# Patient Record
Sex: MALE | Race: WHITE | Employment: OTHER | URBAN - METROPOLITAN AREA
[De-identification: names, ages, dates, MRNs, and addresses within clinical notes are randomized per-mention and may not be internally consistent; named-entity substitution may affect disease eponyms.]

---

## 2017-03-03 ENCOUNTER — TELEPHONE (OUTPATIENT)
Dept: INTERNAL MEDICINE CLINIC | Facility: CLINIC | Age: 82
End: 2017-03-03

## 2017-03-03 NOTE — TELEPHONE ENCOUNTER
[facility] requesting NEW RX for:  Atenolol Tab  Omeprazole Cap  Simvastatin Tab  Clopidogrel Tab   Tasked to [employer]

## 2017-03-06 RX ORDER — CLOPIDOGREL BISULFATE 75 MG/1
TABLET ORAL
Qty: 90 TABLET | Refills: 1 | Status: SHIPPED | OUTPATIENT
Start: 2017-03-06 | End: 2017-08-31

## 2017-03-06 RX ORDER — OMEPRAZOLE 20 MG/1
CAPSULE, DELAYED RELEASE ORAL
Qty: 90 CAPSULE | Refills: 1 | Status: SHIPPED | OUTPATIENT
Start: 2017-03-06 | End: 2017-08-31

## 2017-03-06 RX ORDER — ATENOLOL 25 MG/1
TABLET ORAL
Qty: 90 TABLET | Refills: 1 | Status: SHIPPED | OUTPATIENT
Start: 2017-03-06 | End: 2017-08-31

## 2017-03-06 RX ORDER — SIMVASTATIN 40 MG
TABLET ORAL
Qty: 90 TABLET | Refills: 1 | Status: SHIPPED | OUTPATIENT
Start: 2017-03-06 | End: 2017-08-31

## 2017-03-06 NOTE — TELEPHONE ENCOUNTER
Wife called and clarified meds and proper dosing as we have written.  Will send in 90 day scripts per request

## 2017-04-03 ENCOUNTER — OFFICE VISIT (OUTPATIENT)
Dept: INTERNAL MEDICINE CLINIC | Facility: CLINIC | Age: 82
End: 2017-04-03

## 2017-04-03 VITALS
BODY MASS INDEX: 23.66 KG/M2 | OXYGEN SATURATION: 98 % | SYSTOLIC BLOOD PRESSURE: 140 MMHG | DIASTOLIC BLOOD PRESSURE: 72 MMHG | HEART RATE: 71 BPM | WEIGHT: 169 LBS | TEMPERATURE: 98 F | HEIGHT: 71 IN

## 2017-04-03 DIAGNOSIS — R10.31 BILATERAL LOWER ABDOMINAL DISCOMFORT: ICD-10-CM

## 2017-04-03 DIAGNOSIS — R63.4 WEIGHT LOSS: ICD-10-CM

## 2017-04-03 DIAGNOSIS — Z95.0 STATUS CARDIAC PACEMAKER: Primary | ICD-10-CM

## 2017-04-03 DIAGNOSIS — I48.20 CHRONIC ATRIAL FIBRILLATION (HCC): ICD-10-CM

## 2017-04-03 DIAGNOSIS — Z87.19 HISTORY OF GASTROINTESTINAL BLEEDING: ICD-10-CM

## 2017-04-03 DIAGNOSIS — R10.32 BILATERAL LOWER ABDOMINAL DISCOMFORT: ICD-10-CM

## 2017-04-03 DIAGNOSIS — Z00.00 ROUTINE HEALTH MAINTENANCE: ICD-10-CM

## 2017-04-03 DIAGNOSIS — I25.10 CORONARY ARTERY DISEASE INVOLVING NATIVE CORONARY ARTERY OF NATIVE HEART WITHOUT ANGINA PECTORIS: ICD-10-CM

## 2017-04-03 DIAGNOSIS — Z95.5 S/P CORONARY ARTERY STENT PLACEMENT: ICD-10-CM

## 2017-04-03 DIAGNOSIS — Z86.010 HISTORY OF COLON POLYPS: ICD-10-CM

## 2017-04-03 PROCEDURE — G0463 HOSPITAL OUTPT CLINIC VISIT: HCPCS | Performed by: INTERNAL MEDICINE

## 2017-04-03 PROCEDURE — 99215 OFFICE O/P EST HI 40 MIN: CPT | Performed by: INTERNAL MEDICINE

## 2017-04-03 NOTE — PROGRESS NOTES
Erika Sanchez is a 80year old male   HPI:   Pt.presents for the following problems. Patient has an appointment Dr. Zoey Younger in June. He has a history of colonic polyps with last colonoscopy in 2005. History of tubular adenomas.    He also has a kg)    Body mass index is 23.58 kg/(m^2).        Current Outpatient Prescriptions:  atenolol 25 MG Oral Tab Take 1 tablet by mouth  daily Disp: 90 tablet Rfl: 1   Clopidogrel Bisulfate 75 MG Oral Tab Take 1 tablet by mouth  daily Disp: 90 tablet Rfl: 1   om transient. This was about 3 months ago. He does not feel the low back pain warrants investigation. NEURO:  denies headaches or dizzyness  PSYCHE: Patient under stress at home with his wife. He wishes no counseling. Declines antidepressant therapy. bleeding. 7. Bilateral lower abdominal discomfort  Exam benign. No tenderness. No organomegaly. Offered CT of abdomen and pelvis before his gastroenterology visit but patient declines. He wishes to discuss this with Dr. Tom Kirkland.     8. History o

## 2017-05-23 RX ORDER — LORAZEPAM 0.5 MG/1
TABLET ORAL
Qty: 30 TABLET | Refills: 0 | Status: SHIPPED
Start: 2017-05-23 | End: 2017-07-14

## 2017-05-23 NOTE — TELEPHONE ENCOUNTER
Okay to call in Lorazepam.  Quantity #30.  0.5 mg.  1 daily as needed for anxiety. No additional refills.

## 2017-05-23 NOTE — TELEPHONE ENCOUNTER
To Dr. Ki Calderon  - see below - last fill 2/28/16 --To MD:  The above refill request is for a controlled substance. Please indicate yes or no to refill 30 days supply plus one refill.   If more refills are appropriate, please indicate quantity

## 2017-05-23 NOTE — TELEPHONE ENCOUNTER
Called patient , spoke with spouse - they want RX to mail pharmacy - to DR. JORDAN could you finish pending RX so it can be faxed to mail order pharmacy please thank you

## 2017-06-01 ENCOUNTER — OFFICE VISIT (OUTPATIENT)
Dept: GASTROENTEROLOGY | Facility: CLINIC | Age: 82
End: 2017-06-01

## 2017-06-01 VITALS
HEIGHT: 71 IN | HEART RATE: 70 BPM | SYSTOLIC BLOOD PRESSURE: 140 MMHG | WEIGHT: 172.38 LBS | BODY MASS INDEX: 24.13 KG/M2 | DIASTOLIC BLOOD PRESSURE: 90 MMHG

## 2017-06-01 DIAGNOSIS — Z12.11 SCREENING FOR COLORECTAL CANCER: ICD-10-CM

## 2017-06-01 DIAGNOSIS — Z86.010 HISTORY OF COLON POLYPS: Primary | ICD-10-CM

## 2017-06-01 DIAGNOSIS — Z12.12 SCREENING FOR COLORECTAL CANCER: ICD-10-CM

## 2017-06-01 PROCEDURE — 99203 OFFICE O/P NEW LOW 30 MIN: CPT | Performed by: INTERNAL MEDICINE

## 2017-06-01 PROCEDURE — G0463 HOSPITAL OUTPT CLINIC VISIT: HCPCS | Performed by: INTERNAL MEDICINE

## 2017-06-02 NOTE — PROGRESS NOTES
HPI:    Patient ID: Ras Jones is a 80year old male. HPI  Jd Jefferson returns today to discuss abdominal discomfort and a remote history of colon polyps. He has not been seen for quite some time (greater than 10 years).     Jd Jefferson has a history of an iron defi for anxiety Disp: 30 tablet Rfl: 0   atenolol 25 MG Oral Tab Take 1 tablet by mouth  daily Disp: 90 tablet Rfl: 1   Clopidogrel Bisulfate 75 MG Oral Tab Take 1 tablet by mouth  daily Disp: 90 tablet Rfl: 1   omeprazole 20 MG Oral Capsule Delayed Release Ta 1.04     CALCIUM      8.5-10.5 mg/dL 8.9     ALT (SGPT)      17-63 U/L 18     AST (SGOT)      15-41 U/L 23     ALKALINE PHOSPHATASE       U/L 57     Total Bilirubin      0.3-1.2 mg/dL 0.8     TOTAL PROTEIN      5.9-8.4 g/dL 6.4     Albumin      3.5-4 repeating a colonoscopy for the history of polyps at the age of 80-4/2 is a personal decision. I certainly cannot exclude metachronous polyps.   We discussed the rationale, nature and risks of a colonoscopy including bleeding and perforation requiring surg

## 2017-06-02 NOTE — PATIENT INSTRUCTIONS
1.  Continue omeprazole. 2.  Issues regarding colonoscopy as we discussed. Please contact me if you wish to proceed. 3.  Please follow-up with myself or Dr. Torres if the discomfort continues.

## 2017-07-14 RX ORDER — LORAZEPAM 0.5 MG/1
TABLET ORAL
Qty: 30 TABLET | Refills: 0 | Status: SHIPPED
Start: 2017-07-14 | End: 2019-03-26

## 2017-07-14 RX ORDER — LORAZEPAM 0.5 MG/1
TABLET ORAL
Qty: 30 TABLET | Refills: 0 | Status: SHIPPED
Start: 2017-07-14 | End: 2017-07-14

## 2017-07-14 NOTE — TELEPHONE ENCOUNTER
Order faxed to [address] [place] [address] in error. (Pt wants medication from [facility].)   Will have Dr. Sanchez Jon generate a new order. Pharmacy default changed. [facility] pharmacy to cancel.

## 2017-07-14 NOTE — TELEPHONE ENCOUNTER
Dr. Sd Edwards - the order has to come from you for Epic - we can call/fax after you order it. See pended above.

## 2017-07-14 NOTE — TELEPHONE ENCOUNTER
To Dr. Lundy Shall - To MD:  The above refill request is for a controlled substance. Please indicate yes or no to refill 30 days supply plus one refill.   If more refills are appropriate, please indicate quantity

## 2017-07-24 ENCOUNTER — TELEPHONE (OUTPATIENT)
Dept: INTERNAL MEDICINE CLINIC | Facility: CLINIC | Age: 82
End: 2017-07-24

## 2017-07-24 DIAGNOSIS — Z01.00 EYE EXAM, ROUTINE: Primary | ICD-10-CM

## 2017-07-25 NOTE — TELEPHONE ENCOUNTER
Called and spoke with spouse per HIPAA. Spouse made aware that referral has been generated. Stated patients appt was tomorrow.  Informed that it takes 5-7 days for managed care to process for insurance approval. Advised to reschedule a week or so from today

## 2017-08-22 ENCOUNTER — TELEPHONE (OUTPATIENT)
Dept: INTERNAL MEDICINE CLINIC | Facility: CLINIC | Age: 82
End: 2017-08-22

## 2017-08-22 DIAGNOSIS — H26.8 OTHER CATARACT OF BOTH EYES: Primary | ICD-10-CM

## 2017-08-22 NOTE — TELEPHONE ENCOUNTER
Pt needs a referral for Dr Brian Garza fax [phone] , pt has [facility] Medicare replacement, please call spouse Law Velazquez when complete [phone]   Tasked to nursing

## 2017-09-04 RX ORDER — OMEPRAZOLE 20 MG/1
CAPSULE, DELAYED RELEASE ORAL
Qty: 90 CAPSULE | Refills: 3 | Status: SHIPPED | OUTPATIENT
Start: 2017-09-04 | End: 2018-07-26

## 2017-09-04 RX ORDER — SIMVASTATIN 40 MG
TABLET ORAL
Qty: 90 TABLET | Refills: 3 | Status: SHIPPED | OUTPATIENT
Start: 2017-09-04 | End: 2018-07-26

## 2017-09-04 RX ORDER — ATENOLOL 25 MG/1
TABLET ORAL
Qty: 90 TABLET | Refills: 3 | Status: SHIPPED | OUTPATIENT
Start: 2017-09-04 | End: 2018-07-26

## 2017-09-04 RX ORDER — CLOPIDOGREL BISULFATE 75 MG/1
TABLET ORAL
Qty: 90 TABLET | Refills: 3 | Status: SHIPPED | OUTPATIENT
Start: 2017-09-04 | End: 2018-07-26

## 2017-09-15 ENCOUNTER — TELEPHONE (OUTPATIENT)
Dept: INTERNAL MEDICINE CLINIC | Facility: CLINIC | Age: 82
End: 2017-09-15

## 2017-09-15 DIAGNOSIS — H53.15 METAMORPHOPSIA: Primary | ICD-10-CM

## 2017-09-15 NOTE — TELEPHONE ENCOUNTER
Please advise - Dionna Woodard called from DR. Arrington office. Patient needs to see retinal specialist upcoming Tuesday and needs referral for DR. Anabell Maya Phone # [phone] and fax # [phone] .  Referral is pending and needs to go to [facility] thanks -

## 2017-09-15 NOTE — TELEPHONE ENCOUNTER
office requesting a referral for  [facility] specialist. Call transferred to [facility]

## 2017-09-19 NOTE — TELEPHONE ENCOUNTER
Dr Cindy Martino office calling they also need a copy of referral please fax to them fax #[phone]    Referral faxed

## 2017-10-27 ENCOUNTER — TELEPHONE (OUTPATIENT)
Dept: INTERNAL MEDICINE CLINIC | Facility: CLINIC | Age: 82
End: 2017-10-27

## 2017-10-27 DIAGNOSIS — I48.20 ATRIAL FIBRILLATION, CHRONIC (HCC): Primary | ICD-10-CM

## 2017-10-27 NOTE — TELEPHONE ENCOUNTER
Pt. Needs a referral for Dr. Jimbo Ramirez for a pacemaker   Pt. Has an appt. 12/05  ph.  # [phone]    Routed to clinical

## 2017-12-05 ENCOUNTER — PRIOR ORIGINAL RECORDS (OUTPATIENT)
Dept: OTHER | Age: 82
End: 2017-12-05

## 2018-02-16 ENCOUNTER — APPOINTMENT (OUTPATIENT)
Dept: LAB | Facility: HOSPITAL | Age: 83
End: 2018-02-16
Attending: INTERNAL MEDICINE
Payer: MEDICARE

## 2018-02-16 ENCOUNTER — PRIOR ORIGINAL RECORDS (OUTPATIENT)
Dept: OTHER | Age: 83
End: 2018-02-16

## 2018-02-16 ENCOUNTER — HOSPITAL ENCOUNTER (OUTPATIENT)
Dept: GENERAL RADIOLOGY | Facility: HOSPITAL | Age: 83
Discharge: HOME | End: 2018-02-16
Attending: INTERNAL MEDICINE
Payer: MEDICARE

## 2018-02-16 DIAGNOSIS — I44.2 THIRD DEGREE ATRIOVENTRICULAR BLOCK (HCC): ICD-10-CM

## 2018-02-16 DIAGNOSIS — Z95.0 PACEMAKER: ICD-10-CM

## 2018-02-16 DIAGNOSIS — Z45.010 PACEMAKER AT END OF BATTERY LIFE: ICD-10-CM

## 2018-02-16 DIAGNOSIS — I25.119 ATHEROSCLEROSIS OF NATIVE CORONARY ARTERY OF NATIVE HEART WITH ANGINA PECTORIS (HCC): ICD-10-CM

## 2018-02-16 LAB
ANION GAP SERPL CALC-SCNC: 8 MMOL/L (ref 0–18)
BASOPHILS # BLD: 0.1 K/UL (ref 0–0.2)
BASOPHILS NFR BLD: 1 %
BUN SERPL-MCNC: 14 MG/DL (ref 8–20)
BUN/CREAT SERPL: 13.7 (ref 10–20)
CALCIUM SERPL-MCNC: 8.7 MG/DL (ref 8.5–10.5)
CHLORIDE SERPL-SCNC: 104 MMOL/L (ref 95–110)
CO2 SERPL-SCNC: 28 MMOL/L (ref 22–32)
CREAT SERPL-MCNC: 1.02 MG/DL (ref 0.5–1.5)
EOSINOPHIL # BLD: 0.3 K/UL (ref 0–0.7)
EOSINOPHIL NFR BLD: 4 %
ERYTHROCYTE [DISTWIDTH] IN BLOOD BY AUTOMATED COUNT: 15.1 % (ref 11–15)
GLUCOSE SERPL-MCNC: 117 MG/DL (ref 70–99)
HCT VFR BLD AUTO: 40.3 % (ref 41–52)
HGB BLD-MCNC: 13.9 G/DL (ref 13.5–17.5)
LYMPHOCYTES # BLD: 1.2 K/UL (ref 1–4)
LYMPHOCYTES NFR BLD: 17 %
MCH RBC QN AUTO: 30.9 PG (ref 27–32)
MCHC RBC AUTO-ENTMCNC: 34.5 G/DL (ref 32–37)
MCV RBC AUTO: 89.5 FL (ref 80–100)
MONOCYTES # BLD: 0.6 K/UL (ref 0–1)
MONOCYTES NFR BLD: 9 %
MRSA DNA SPEC QL NAA+PROBE: NEGATIVE
NEUTROPHILS # BLD AUTO: 4.7 K/UL (ref 1.8–7.7)
NEUTROPHILS NFR BLD: 69 %
OSMOLALITY UR CALC.SUM OF ELEC: 292 MOSM/KG (ref 275–295)
PLATELET # BLD AUTO: 203 K/UL (ref 140–400)
PMV BLD AUTO: 8.2 FL (ref 7.4–10.3)
POTASSIUM SERPL-SCNC: 4.5 MMOL/L (ref 3.3–5.1)
RBC # BLD AUTO: 4.5 M/UL (ref 4.5–5.9)
SODIUM SERPL-SCNC: 140 MMOL/L (ref 136–144)
WBC # BLD AUTO: 6.8 K/UL (ref 4–11)

## 2018-02-16 PROCEDURE — 80048 BASIC METABOLIC PNL TOTAL CA: CPT

## 2018-02-16 PROCEDURE — 87641 MR-STAPH DNA AMP PROBE: CPT

## 2018-02-16 PROCEDURE — 85025 COMPLETE CBC W/AUTO DIFF WBC: CPT

## 2018-02-16 PROCEDURE — 93005 ELECTROCARDIOGRAM TRACING: CPT

## 2018-02-16 PROCEDURE — 93010 ELECTROCARDIOGRAM REPORT: CPT | Performed by: INTERNAL MEDICINE

## 2018-02-16 PROCEDURE — 36415 COLL VENOUS BLD VENIPUNCTURE: CPT

## 2018-02-16 PROCEDURE — 71046 X-RAY EXAM CHEST 2 VIEWS: CPT | Performed by: INTERNAL MEDICINE

## 2018-02-17 ENCOUNTER — HOSPITAL ENCOUNTER (OUTPATIENT)
Dept: CV DIAGNOSTICS | Facility: HOSPITAL | Age: 83
Discharge: HOME | End: 2018-02-17
Attending: INTERNAL MEDICINE
Payer: MEDICARE

## 2018-02-17 DIAGNOSIS — I48.20 ATRIAL FIBRILLATION, CHRONIC (HCC): ICD-10-CM

## 2018-02-17 DIAGNOSIS — I44.2 ATRIOVENTRICULAR BLOCK, COMPLETE (HCC): ICD-10-CM

## 2018-02-17 DIAGNOSIS — I25.10 CORONARY ATHEROSCLEROSIS OF NATIVE CORONARY ARTERY: ICD-10-CM

## 2018-02-17 PROCEDURE — 93306 TTE W/DOPPLER COMPLETE: CPT | Performed by: INTERNAL MEDICINE

## 2018-02-19 ENCOUNTER — PRIOR ORIGINAL RECORDS (OUTPATIENT)
Dept: OTHER | Age: 83
End: 2018-02-19

## 2018-02-19 LAB
BUN: 14 MG/DL
CALCIUM: 8.7 MG/DL
CHLORIDE: 104 MEQ/L
CREATININE, SERUM: 1.02 MG/DL
GLUCOSE: 117 MG/DL
HEMATOCRIT: 40.3 %
HEMOGLOBIN: 13.9 G/DL
PLATELETS: 203 K/UL
POTASSIUM, SERUM: 4.5 MEQ/L
RED BLOOD COUNT: 4.5 X 10-6/U
SODIUM: 140 MEQ/L
WHITE BLOOD COUNT: 6.8 X 10-3/U

## 2018-02-20 ENCOUNTER — PRIOR ORIGINAL RECORDS (OUTPATIENT)
Dept: OTHER | Age: 83
End: 2018-02-20

## 2018-02-21 RX ORDER — SODIUM CHLORIDE 9 MG/ML
INJECTION, SOLUTION INTRAVENOUS CONTINUOUS
Status: DISCONTINUED | OUTPATIENT
Start: 2018-02-23 | End: 2018-02-23

## 2018-02-23 ENCOUNTER — HOSPITAL ENCOUNTER (OUTPATIENT)
Dept: INTERVENTIONAL RADIOLOGY/VASCULAR | Facility: HOSPITAL | Age: 83
Discharge: HOME | End: 2018-02-23
Attending: INTERNAL MEDICINE | Admitting: INTERNAL MEDICINE
Payer: MEDICARE

## 2018-02-23 ENCOUNTER — ANESTHESIA EVENT (OUTPATIENT)
Dept: INTERVENTIONAL RADIOLOGY/VASCULAR | Facility: HOSPITAL | Age: 83
End: 2018-02-23
Payer: MEDICARE

## 2018-02-23 ENCOUNTER — APPOINTMENT (OUTPATIENT)
Dept: GENERAL RADIOLOGY | Facility: HOSPITAL | Age: 83
End: 2018-02-23
Attending: INTERNAL MEDICINE
Payer: MEDICARE

## 2018-02-23 VITALS
DIASTOLIC BLOOD PRESSURE: 92 MMHG | TEMPERATURE: 98 F | HEART RATE: 88 BPM | WEIGHT: 173 LBS | RESPIRATION RATE: 18 BRPM | BODY MASS INDEX: 24.22 KG/M2 | OXYGEN SATURATION: 100 % | SYSTOLIC BLOOD PRESSURE: 160 MMHG | HEIGHT: 71 IN

## 2018-02-23 DIAGNOSIS — Z45.010 PACEMAKER BATTERY DEPLETION: ICD-10-CM

## 2018-02-23 DIAGNOSIS — I25.10 CAD (CORONARY ARTERY DISEASE): ICD-10-CM

## 2018-02-23 DIAGNOSIS — Z95.0 PACEMAKER: ICD-10-CM

## 2018-02-23 DIAGNOSIS — I44.2 AV BLOCK, 3RD DEGREE (HCC): ICD-10-CM

## 2018-02-23 DIAGNOSIS — R00.1 BRADYCARDIA: ICD-10-CM

## 2018-02-23 DIAGNOSIS — I25.10 ARTERIOSCLEROTIC HEART DISEASE (ASHD): Primary | ICD-10-CM

## 2018-02-23 PROCEDURE — 33228 REMV&REPLC PM GEN DUAL LEAD: CPT

## 2018-02-23 PROCEDURE — 0JPT0PZ REMOVAL OF CARDIAC RHYTHM RELATED DEVICE FROM TRUNK SUBCUTANEOUS TISSUE AND FASCIA, OPEN APPROACH: ICD-10-PCS | Performed by: INTERNAL MEDICINE

## 2018-02-23 PROCEDURE — 71045 X-RAY EXAM CHEST 1 VIEW: CPT | Performed by: INTERNAL MEDICINE

## 2018-02-23 PROCEDURE — 0JH606Z INSERTION OF PACEMAKER, DUAL CHAMBER INTO CHEST SUBCUTANEOUS TISSUE AND FASCIA, OPEN APPROACH: ICD-10-PCS | Performed by: INTERNAL MEDICINE

## 2018-02-23 RX ORDER — SODIUM CHLORIDE, SODIUM LACTATE, POTASSIUM CHLORIDE, CALCIUM CHLORIDE 600; 310; 30; 20 MG/100ML; MG/100ML; MG/100ML; MG/100ML
INJECTION, SOLUTION INTRAVENOUS CONTINUOUS
Status: DISCONTINUED | OUTPATIENT
Start: 2018-02-23 | End: 2018-02-23

## 2018-02-23 RX ORDER — HALOPERIDOL 5 MG/ML
0.25 INJECTION INTRAMUSCULAR ONCE AS NEEDED
Status: DISCONTINUED | OUTPATIENT
Start: 2018-02-23 | End: 2018-02-23

## 2018-02-23 RX ORDER — SODIUM CHLORIDE, SODIUM LACTATE, POTASSIUM CHLORIDE, CALCIUM CHLORIDE 600; 310; 30; 20 MG/100ML; MG/100ML; MG/100ML; MG/100ML
INJECTION, SOLUTION INTRAVENOUS CONTINUOUS PRN
Status: DISCONTINUED | OUTPATIENT
Start: 2018-02-23 | End: 2018-02-23 | Stop reason: SURG

## 2018-02-23 RX ORDER — HYDROCODONE BITARTRATE AND ACETAMINOPHEN 5; 325 MG/1; MG/1
2 TABLET ORAL AS NEEDED
Status: DISCONTINUED | OUTPATIENT
Start: 2018-02-23 | End: 2018-02-23

## 2018-02-23 RX ORDER — CEFAZOLIN SODIUM 1 G/3ML
INJECTION, POWDER, FOR SOLUTION INTRAMUSCULAR; INTRAVENOUS AS NEEDED
Status: DISCONTINUED | OUTPATIENT
Start: 2018-02-23 | End: 2018-02-23 | Stop reason: SURG

## 2018-02-23 RX ORDER — ONDANSETRON 2 MG/ML
4 INJECTION INTRAMUSCULAR; INTRAVENOUS ONCE AS NEEDED
Status: DISCONTINUED | OUTPATIENT
Start: 2018-02-23 | End: 2018-02-23

## 2018-02-23 RX ORDER — MORPHINE SULFATE 10 MG/ML
6 INJECTION, SOLUTION INTRAMUSCULAR; INTRAVENOUS EVERY 10 MIN PRN
Status: DISCONTINUED | OUTPATIENT
Start: 2018-02-23 | End: 2018-02-23

## 2018-02-23 RX ORDER — GLYCOPYRROLATE 0.2 MG/ML
INJECTION, SOLUTION INTRAMUSCULAR; INTRAVENOUS AS NEEDED
Status: DISCONTINUED | OUTPATIENT
Start: 2018-02-23 | End: 2018-02-23 | Stop reason: SURG

## 2018-02-23 RX ORDER — MORPHINE SULFATE 4 MG/ML
2 INJECTION, SOLUTION INTRAMUSCULAR; INTRAVENOUS EVERY 10 MIN PRN
Status: DISCONTINUED | OUTPATIENT
Start: 2018-02-23 | End: 2018-02-23

## 2018-02-23 RX ORDER — METOPROLOL TARTRATE 5 MG/5ML
2.5 INJECTION INTRAVENOUS ONCE
Status: DISCONTINUED | OUTPATIENT
Start: 2018-02-23 | End: 2018-02-23

## 2018-02-23 RX ORDER — HYDROCODONE BITARTRATE AND ACETAMINOPHEN 5; 325 MG/1; MG/1
1 TABLET ORAL AS NEEDED
Status: DISCONTINUED | OUTPATIENT
Start: 2018-02-23 | End: 2018-02-23

## 2018-02-23 RX ORDER — SODIUM CHLORIDE 9 MG/ML
INJECTION, SOLUTION INTRAVENOUS
Status: COMPLETED
Start: 2018-02-23 | End: 2018-02-23

## 2018-02-23 RX ORDER — SODIUM CHLORIDE 9 MG/ML
INJECTION, SOLUTION INTRAVENOUS
Status: DISCONTINUED
Start: 2018-02-23 | End: 2018-02-23

## 2018-02-23 RX ORDER — ONDANSETRON 2 MG/ML
INJECTION INTRAMUSCULAR; INTRAVENOUS AS NEEDED
Status: DISCONTINUED | OUTPATIENT
Start: 2018-02-23 | End: 2018-02-23 | Stop reason: SURG

## 2018-02-23 RX ORDER — DEXAMETHASONE SODIUM PHOSPHATE 4 MG/ML
VIAL (ML) INJECTION AS NEEDED
Status: DISCONTINUED | OUTPATIENT
Start: 2018-02-23 | End: 2018-02-23 | Stop reason: SURG

## 2018-02-23 RX ORDER — BACITRACIN 50000 [USP'U]/1
INJECTION, POWDER, LYOPHILIZED, FOR SOLUTION INTRAMUSCULAR
Status: COMPLETED
Start: 2018-02-23 | End: 2018-02-23

## 2018-02-23 RX ORDER — MORPHINE SULFATE 4 MG/ML
4 INJECTION, SOLUTION INTRAMUSCULAR; INTRAVENOUS EVERY 10 MIN PRN
Status: DISCONTINUED | OUTPATIENT
Start: 2018-02-23 | End: 2018-02-23

## 2018-02-23 RX ORDER — CEFAZOLIN SODIUM/WATER 2 G/20 ML
SYRINGE (ML) INTRAVENOUS
Status: COMPLETED
Start: 2018-02-23 | End: 2018-02-23

## 2018-02-23 RX ORDER — VIT A/VIT C/VIT E/ZINC/COPPER 2148-113
1 TABLET ORAL 2 TIMES DAILY
COMMUNITY
End: 2021-01-01

## 2018-02-23 RX ORDER — NALOXONE HYDROCHLORIDE 0.4 MG/ML
80 INJECTION, SOLUTION INTRAMUSCULAR; INTRAVENOUS; SUBCUTANEOUS AS NEEDED
Status: DISCONTINUED | OUTPATIENT
Start: 2018-02-23 | End: 2018-02-23

## 2018-02-23 RX ORDER — LIDOCAINE HYDROCHLORIDE AND EPINEPHRINE 10; 10 MG/ML; UG/ML
INJECTION, SOLUTION INFILTRATION; PERINEURAL
Status: COMPLETED
Start: 2018-02-23 | End: 2018-02-23

## 2018-02-23 RX ADMIN — SODIUM CHLORIDE: 9 INJECTION, SOLUTION INTRAVENOUS at 13:13:00

## 2018-02-23 RX ADMIN — CEFAZOLIN SODIUM 2 G: 1 INJECTION, POWDER, FOR SOLUTION INTRAMUSCULAR; INTRAVENOUS at 14:03:00

## 2018-02-23 RX ADMIN — ONDANSETRON 4 MG: 2 INJECTION INTRAMUSCULAR; INTRAVENOUS at 14:00:00

## 2018-02-23 RX ADMIN — SODIUM CHLORIDE, SODIUM LACTATE, POTASSIUM CHLORIDE, CALCIUM CHLORIDE: 600; 310; 30; 20 INJECTION, SOLUTION INTRAVENOUS at 15:05:00

## 2018-02-23 RX ADMIN — SODIUM CHLORIDE, SODIUM LACTATE, POTASSIUM CHLORIDE, CALCIUM CHLORIDE: 600; 310; 30; 20 INJECTION, SOLUTION INTRAVENOUS at 13:55:00

## 2018-02-23 RX ADMIN — GLYCOPYRROLATE 0.2 MG: 0.2 INJECTION, SOLUTION INTRAMUSCULAR; INTRAVENOUS at 14:00:00

## 2018-02-23 RX ADMIN — DEXAMETHASONE SODIUM PHOSPHATE 4 MG: 4 MG/ML VIAL (ML) INJECTION at 14:00:00

## 2018-02-23 NOTE — ANESTHESIA POSTPROCEDURE EVALUATION
Patient: Levi Alfredo    Procedure Summary     Date:  02/23/18 Room / Location:  [facility]    Anesthesia Start:  5410 Anesthesia Stop:      Procedure:  EP BI-VENT PERMANENT PACEMAKER Diagnosis:       Pacemaker battery depleti

## 2018-02-23 NOTE — OPERATIVE REPORT
[facility]    Cardiac Electrophysiology Operative Note    [facility]   [facility] [id number] MRN [id number]   Admission Date 2/23/2018 Procedure Date 2/23/2018   Attending Physician Jb Barker MD Procedure Physician old generator was returned to the vendor for analysis. The new generator was connected to the leads, with visual inspection and gentle tugging confirming a secure connection. Analysis of lead function showed stable chronic findings.   The device pocket wa

## 2018-02-23 NOTE — ANESTHESIA PREPROCEDURE EVALUATION
Anesthesia PreOp Note    HPI:     Good Silva is a 80year old male who presents for preoperative consultation requested by: * No surgeons listed *    Date of Surgery: 2/23/2018    * No procedures listed *  Indication: * No pre-op diagnosis entered * Take 1 tablet by mouth daily. Disp:  Rfl:  2/21/2018 at Unknown time   aspirin 81 MG Oral Tab Take 81 mg by mouth daily.  Disp:  Rfl:  2/21/2018 at Unknown time   LORazepam 0.5 MG Oral Tab 1 tablet daily as needed for anxiety Disp: 30 tablet Rfl: 0 2/21/2 MPV 8.2 02/16/2018       Lab Results  Component Value Date    02/16/2018   K 4.5 02/16/2018    02/16/2018   CO2 28 02/16/2018   BUN 14 02/16/2018   CREATSERUM 1.02 02/16/2018    (H) 02/16/2018   CA 8.7 02/16/2018          Vital Signs:

## 2018-03-06 ENCOUNTER — PRIOR ORIGINAL RECORDS (OUTPATIENT)
Dept: OTHER | Age: 83
End: 2018-03-06

## 2018-05-24 ENCOUNTER — PATIENT OUTREACH (OUTPATIENT)
Dept: INTERNAL MEDICINE CLINIC | Facility: CLINIC | Age: 83
End: 2018-05-24

## 2018-06-19 ENCOUNTER — OFFICE VISIT (OUTPATIENT)
Dept: INTERNAL MEDICINE CLINIC | Facility: CLINIC | Age: 83
End: 2018-06-19

## 2018-06-19 VITALS
HEART RATE: 70 BPM | SYSTOLIC BLOOD PRESSURE: 134 MMHG | OXYGEN SATURATION: 98 % | HEIGHT: 71 IN | WEIGHT: 171.5 LBS | TEMPERATURE: 98 F | BODY MASS INDEX: 24.01 KG/M2 | DIASTOLIC BLOOD PRESSURE: 72 MMHG

## 2018-06-19 DIAGNOSIS — Z95.5 S/P CORONARY ARTERY STENT PLACEMENT: ICD-10-CM

## 2018-06-19 DIAGNOSIS — I25.10 CORONARY ARTERY DISEASE INVOLVING NATIVE CORONARY ARTERY OF NATIVE HEART WITHOUT ANGINA PECTORIS: ICD-10-CM

## 2018-06-19 DIAGNOSIS — Z00.00 MEDICARE ANNUAL WELLNESS VISIT, SUBSEQUENT: Primary | ICD-10-CM

## 2018-06-19 DIAGNOSIS — Z95.0 PRESENCE OF PERMANENT CARDIAC PACEMAKER: ICD-10-CM

## 2018-06-19 DIAGNOSIS — Z00.00 ROUTINE HEALTH MAINTENANCE: ICD-10-CM

## 2018-06-19 DIAGNOSIS — R53.83 FATIGUE, UNSPECIFIED TYPE: ICD-10-CM

## 2018-06-19 PROBLEM — D50.9 ANEMIA, IRON DEFICIENCY: Status: RESOLVED | Noted: 2018-06-19 | Resolved: 2018-06-19

## 2018-06-19 PROBLEM — K57.90 DIVERTICULOSIS: Status: RESOLVED | Noted: 2018-06-19 | Resolved: 2018-06-19

## 2018-06-19 PROBLEM — K57.90 DIVERTICULAR DISEASE: Status: RESOLVED | Noted: 2018-06-19 | Resolved: 2018-06-19

## 2018-06-19 PROBLEM — K92.2 GI BLEED: Status: RESOLVED | Noted: 2018-06-19 | Resolved: 2018-06-19

## 2018-06-19 PROCEDURE — 99214 OFFICE O/P EST MOD 30 MIN: CPT | Performed by: INTERNAL MEDICINE

## 2018-06-19 PROCEDURE — G0439 PPPS, SUBSEQ VISIT: HCPCS | Performed by: INTERNAL MEDICINE

## 2018-06-19 PROCEDURE — G0463 HOSPITAL OUTPT CLINIC VISIT: HCPCS | Performed by: INTERNAL MEDICINE

## 2018-06-19 NOTE — PROGRESS NOTES
You Summers is a 80year old male   HPI:   Pt.presents for the following problems. Patient has no acute physical complaints. He did have a procedure with Dr. Gil Gannon February 2018 where he had implantation of a dual chamber pacemaker.    Intra-Op fabby morning before  breakfast Disp: 90 capsule Rfl: 3   LORazepam 0.5 MG Oral Tab 1 tablet daily as needed for anxiety Disp: 30 tablet Rfl: 0   loratadine 10 MG Oral Tab Take 10 mg by mouth daily. Disp:  Rfl:    IRON OR Take 1 tablet by mouth daily.    Disp:  R oz (77.8 kg)   SpO2 98%   BMI 23.92 kg/m²     GENERAL:  well developed, well nourished in no apparent distress  SKIN:  no rashes, no suspicious lesions in areas examined. HEENT:  atraumatic, TM's normal. Canals clear. Pharynx without exudate or erythema. bathing?: No    Problems with daily activities? : No    Memory Problems?: No      Fall/Risk Assessment          Have you fallen in the last 12 months?: 0-No                                        Fall/Risk Scorin          Depression Screening (PHQ-2/PH Acuity: Uncorrected   Right Eye Chart Acuity: 20/70 Left Eye Chart Acuity: 20/50     Cognitive Assessment     What day of the week is this?: Correct    What month is it?: Correct    What year is it?: Correct    Recall \"Ball\": Correct    Recall \"Flag\": Immunization Activity if applicable    Tetanus No orders found for this or any previous visit. Update Immunization Activity if applicable    Zoster (Not covered by Medicare Part B) No orders found for this or any previous visit.  Update Immunization Activit stenting. Problem list reviewed. Patient is atrial fibrillation, coronary artery disease with drug-eluting stent to the LAD, hyperlipidemia, hypertension, macular degeneration and pacemaker. All these problems were reviewed with patient. Suzan.   Scott

## 2018-07-27 RX ORDER — SIMVASTATIN 40 MG
TABLET ORAL
Qty: 90 TABLET | Refills: 0 | Status: SHIPPED | OUTPATIENT
Start: 2018-07-27 | End: 2018-10-09

## 2018-07-27 RX ORDER — CLOPIDOGREL BISULFATE 75 MG/1
TABLET ORAL
Qty: 90 TABLET | Refills: 0 | Status: SHIPPED | OUTPATIENT
Start: 2018-07-27 | End: 2018-10-09

## 2018-07-27 RX ORDER — ATENOLOL 25 MG/1
TABLET ORAL
Qty: 90 TABLET | Refills: 0 | Status: SHIPPED | OUTPATIENT
Start: 2018-07-27 | End: 2018-10-09

## 2018-07-27 RX ORDER — OMEPRAZOLE 20 MG/1
CAPSULE, DELAYED RELEASE ORAL
Qty: 90 CAPSULE | Refills: 0 | Status: SHIPPED | OUTPATIENT
Start: 2018-07-27 | End: 2018-10-09

## 2018-09-05 ENCOUNTER — TELEPHONE (OUTPATIENT)
Dept: ALLERGY | Facility: CLINIC | Age: 83
End: 2018-09-05

## 2018-09-07 ENCOUNTER — PRIOR ORIGINAL RECORDS (OUTPATIENT)
Dept: OTHER | Age: 83
End: 2018-09-07

## 2018-09-07 ENCOUNTER — MYAURORA ACCOUNT LINK (OUTPATIENT)
Dept: OTHER | Age: 83
End: 2018-09-07

## 2018-10-14 NOTE — TELEPHONE ENCOUNTER
Okay to refill x 1.  Please call patient and advise him to go for labs that were placed so we can continue to refill his medications

## 2018-10-15 RX ORDER — CLOPIDOGREL BISULFATE 75 MG/1
75 TABLET ORAL
Qty: 90 TABLET | Refills: 0 | Status: SHIPPED | OUTPATIENT
Start: 2018-10-15 | End: 2019-03-06

## 2018-10-15 RX ORDER — OMEPRAZOLE 20 MG/1
20 CAPSULE, DELAYED RELEASE ORAL EVERY MORNING
Qty: 90 CAPSULE | Refills: 0 | Status: SHIPPED | OUTPATIENT
Start: 2018-10-15 | End: 2019-03-12

## 2018-10-15 RX ORDER — SIMVASTATIN 40 MG
40 TABLET ORAL NIGHTLY
Qty: 90 TABLET | Refills: 0 | Status: SHIPPED | OUTPATIENT
Start: 2018-10-15 | End: 2019-01-21

## 2018-10-15 RX ORDER — ATENOLOL 25 MG/1
25 TABLET ORAL
Qty: 90 TABLET | Refills: 0 | Status: SHIPPED | OUTPATIENT
Start: 2018-10-15 | End: 2019-03-06

## 2018-10-15 NOTE — TELEPHONE ENCOUNTER
Refill request is for a maintenance medication and has met the criteria specified in the Ambulatory Medication Refill Standing Order for eligibility, visits, laboratory, alerts and was sent to the requested pharmacy.     TAYLER conn for pt

## 2018-12-19 ENCOUNTER — MYAURORA ACCOUNT LINK (OUTPATIENT)
Dept: OTHER | Age: 83
End: 2018-12-19

## 2019-01-11 ENCOUNTER — TELEPHONE (OUTPATIENT)
Dept: INTERNAL MEDICINE CLINIC | Facility: CLINIC | Age: 84
End: 2019-01-11

## 2019-01-11 DIAGNOSIS — E78.5 HYPERLIPIDEMIA, UNSPECIFIED HYPERLIPIDEMIA TYPE: Primary | ICD-10-CM

## 2019-01-11 NOTE — TELEPHONE ENCOUNTER
Wife, Garry Patel, called to request lab orders  States pt needs to have blood test done for his Rx refills   - would like to go for labs this Monday Jan 14    Please call Garry Patel to advise [phone]

## 2019-01-11 NOTE — TELEPHONE ENCOUNTER
Spoke with pts wife to relay MD message that lab orders have been placed, she thanks Dr. Kamron Miller for prompt action.

## 2019-01-16 ENCOUNTER — TELEPHONE (OUTPATIENT)
Dept: INTERNAL MEDICINE CLINIC | Facility: CLINIC | Age: 84
End: 2019-01-16

## 2019-01-16 ENCOUNTER — LAB ENCOUNTER (OUTPATIENT)
Dept: LAB | Facility: HOSPITAL | Age: 84
End: 2019-01-16
Attending: INTERNAL MEDICINE
Payer: MEDICARE

## 2019-01-16 DIAGNOSIS — R53.83 FATIGUE, UNSPECIFIED TYPE: ICD-10-CM

## 2019-01-16 DIAGNOSIS — E78.5 HYPERLIPIDEMIA, UNSPECIFIED HYPERLIPIDEMIA TYPE: ICD-10-CM

## 2019-01-16 DIAGNOSIS — I25.10 CORONARY ARTERY DISEASE INVOLVING NATIVE CORONARY ARTERY OF NATIVE HEART WITHOUT ANGINA PECTORIS: ICD-10-CM

## 2019-01-16 LAB
ALBUMIN SERPL BCP-MCNC: 4 G/DL (ref 3.5–4.8)
ALBUMIN/GLOB SERPL: 1.5 {RATIO} (ref 1–2)
ALP SERPL-CCNC: 52 U/L (ref 32–100)
ALT SERPL-CCNC: 17 U/L (ref 17–63)
ANION GAP SERPL CALC-SCNC: 8 MMOL/L (ref 0–18)
AST SERPL-CCNC: 25 U/L (ref 15–41)
BACTERIA UR QL AUTO: NEGATIVE /HPF
BASOPHILS # BLD: 0.1 K/UL (ref 0–0.2)
BASOPHILS NFR BLD: 1 %
BILIRUB SERPL-MCNC: 1.1 MG/DL (ref 0.3–1.2)
BILIRUB UR QL: NEGATIVE
BUN SERPL-MCNC: 14 MG/DL (ref 8–20)
BUN/CREAT SERPL: 13.3 (ref 10–20)
CALCIUM SERPL-MCNC: 9.1 MG/DL (ref 8.5–10.5)
CHLORIDE SERPL-SCNC: 107 MMOL/L (ref 95–110)
CHOLEST SERPL-MCNC: 96 MG/DL (ref 110–200)
CLARITY UR: CLEAR
CO2 SERPL-SCNC: 27 MMOL/L (ref 22–32)
COLOR UR: YELLOW
CREAT SERPL-MCNC: 1.05 MG/DL (ref 0.5–1.5)
EOSINOPHIL # BLD: 0.1 K/UL (ref 0–0.7)
EOSINOPHIL NFR BLD: 2 %
ERYTHROCYTE [DISTWIDTH] IN BLOOD BY AUTOMATED COUNT: 14.8 % (ref 11–15)
GLOBULIN PLAS-MCNC: 2.6 G/DL (ref 2.5–3.7)
GLUCOSE SERPL-MCNC: 120 MG/DL (ref 70–99)
GLUCOSE UR-MCNC: NEGATIVE MG/DL
HCT VFR BLD AUTO: 42.1 % (ref 41–52)
HDLC SERPL-MCNC: 40 MG/DL
HGB BLD-MCNC: 14.2 G/DL (ref 13.5–17.5)
HGB UR QL STRIP.AUTO: NEGATIVE
KETONES UR-MCNC: NEGATIVE MG/DL
LDLC SERPL CALC-MCNC: 46 MG/DL (ref 0–99)
LEUKOCYTE ESTERASE UR QL STRIP.AUTO: NEGATIVE
LYMPHOCYTES # BLD: 1.1 K/UL (ref 1–4)
LYMPHOCYTES NFR BLD: 18 %
MCH RBC QN AUTO: 30.5 PG (ref 27–32)
MCHC RBC AUTO-ENTMCNC: 33.6 G/DL (ref 32–37)
MCV RBC AUTO: 90.6 FL (ref 80–100)
MONOCYTES # BLD: 0.5 K/UL (ref 0–1)
MONOCYTES NFR BLD: 9 %
NEUTROPHILS # BLD AUTO: 4.4 K/UL (ref 1.8–7.7)
NEUTROPHILS NFR BLD: 71 %
NITRITE UR QL STRIP.AUTO: NEGATIVE
NONHDLC SERPL-MCNC: 56 MG/DL
OSMOLALITY UR CALC.SUM OF ELEC: 296 MOSM/KG (ref 275–295)
PATIENT FASTING: YES
PH UR: 5 [PH] (ref 5–8)
PLATELET # BLD AUTO: 211 K/UL (ref 140–400)
PMV BLD AUTO: 8.5 FL (ref 7.4–10.3)
POTASSIUM SERPL-SCNC: 4.6 MMOL/L (ref 3.3–5.1)
PROT SERPL-MCNC: 6.6 G/DL (ref 5.9–8.4)
PROT UR-MCNC: NEGATIVE MG/DL
RBC # BLD AUTO: 4.65 M/UL (ref 4.5–5.9)
RBC #/AREA URNS AUTO: 1 /HPF
SODIUM SERPL-SCNC: 142 MMOL/L (ref 136–144)
SP GR UR STRIP: 1.01 (ref 1–1.03)
TRIGL SERPL-MCNC: 52 MG/DL (ref 1–149)
TSH SERPL-ACNC: 2.12 UIU/ML (ref 0.45–5.33)
UROBILINOGEN UR STRIP-ACNC: <2
VIT C UR-MCNC: 40 MG/DL
WBC # BLD AUTO: 6.2 K/UL (ref 4–11)
WBC #/AREA URNS AUTO: 1 /HPF

## 2019-01-16 PROCEDURE — 80061 LIPID PANEL: CPT

## 2019-01-16 PROCEDURE — 81003 URINALYSIS AUTO W/O SCOPE: CPT | Performed by: INTERNAL MEDICINE

## 2019-01-16 PROCEDURE — 80053 COMPREHEN METABOLIC PANEL: CPT

## 2019-01-16 PROCEDURE — 36415 COLL VENOUS BLD VENIPUNCTURE: CPT

## 2019-01-16 PROCEDURE — 85025 COMPLETE CBC W/AUTO DIFF WBC: CPT

## 2019-01-16 PROCEDURE — 84443 ASSAY THYROID STIM HORMONE: CPT

## 2019-01-16 NOTE — TELEPHONE ENCOUNTER
Message relayed to patient's wife Issa Méndez (per HIPAA) who verbalized understanding. She will relay to the patient and encourage him to call back with any questions/concerns. Nothing further at this time.

## 2019-01-16 NOTE — TELEPHONE ENCOUNTER
Please notify patient that his labs were acceptable. Blood sugar 120 which is a little bit high. Diabetes is diagnosed at 126 or greater.   For now he should just watch his concentrated sweets such as sodas, fruit juices, pastries, desserts and concentrat

## 2019-01-21 ENCOUNTER — TELEPHONE (OUTPATIENT)
Dept: INTERNAL MEDICINE CLINIC | Facility: CLINIC | Age: 84
End: 2019-01-21

## 2019-01-21 RX ORDER — SIMVASTATIN 40 MG
40 TABLET ORAL NIGHTLY
Qty: 90 TABLET | Refills: 0 | Status: SHIPPED | OUTPATIENT
Start: 2019-01-21 | End: 2019-03-26

## 2019-01-21 NOTE — TELEPHONE ENCOUNTER
To front office - please call next week to make sure patient will schedule a F/U with DR. JORDAN - has not been seen since June 2018

## 2019-01-21 NOTE — TELEPHONE ENCOUNTER
Called spouse per hipaa and patient is on simvastatin 40 mg - also encouraged spouse that patient needs to schedule [place].  With DR.K Moreno to refill medications until next office visit

## 2019-01-22 NOTE — TELEPHONE ENCOUNTER
To [employer] - please call pt to schedule 6 month visit - due 12/18. Then back to clinical after appt scheduled.

## 2019-02-28 VITALS
DIASTOLIC BLOOD PRESSURE: 70 MMHG | WEIGHT: 174 LBS | OXYGEN SATURATION: 99 % | BODY MASS INDEX: 24.36 KG/M2 | SYSTOLIC BLOOD PRESSURE: 138 MMHG | HEART RATE: 70 BPM | HEIGHT: 71 IN

## 2019-02-28 VITALS
DIASTOLIC BLOOD PRESSURE: 72 MMHG | RESPIRATION RATE: 16 BRPM | BODY MASS INDEX: 23.8 KG/M2 | HEART RATE: 70 BPM | WEIGHT: 170 LBS | SYSTOLIC BLOOD PRESSURE: 130 MMHG | HEIGHT: 71 IN

## 2019-02-28 VITALS
SYSTOLIC BLOOD PRESSURE: 148 MMHG | HEART RATE: 66 BPM | RESPIRATION RATE: 16 BRPM | WEIGHT: 171 LBS | DIASTOLIC BLOOD PRESSURE: 70 MMHG

## 2019-03-01 ENCOUNTER — TELEPHONE (OUTPATIENT)
Dept: INTERNAL MEDICINE CLINIC | Facility: CLINIC | Age: 84
End: 2019-03-01

## 2019-03-01 DIAGNOSIS — Z01.00 ENCOUNTER FOR COMPLETE EYE EXAM: Primary | ICD-10-CM

## 2019-03-01 NOTE — TELEPHONE ENCOUNTER
Patient needs a referral for Dr. Mansoor Lewis, opthamalogist for a routine visit. Appointment is 3/5.

## 2019-03-05 NOTE — TELEPHONE ENCOUNTER
Please let pt know when the referral is complete, [phone]   Tasked to [facility]

## 2019-03-06 NOTE — TELEPHONE ENCOUNTER
Referral printed per [facility] and faxed to Dr. Bijal Cisneros office ([phone]); fax failure x7. Verified fax # with Dr. Stevenson Peña office.      Spoke to Lakesha at Dr. Stevenson Peña office and advised fax failure x7; states number is correct and no alternative fax numb

## 2019-03-06 NOTE — TELEPHONE ENCOUNTER
Attempted to fax x2 more attempts with failure status. Paperwork in Triage room.  RN please attempt to re-fax tomorrow, 3/7

## 2019-03-06 NOTE — TELEPHONE ENCOUNTER
Patient wife [place] calling for patient. Patient has appointment at 11am today. Asking that referral be faxed to Dr. Maria R Rosario office.     Printed message given to [facility]

## 2019-03-07 NOTE — TELEPHONE ENCOUNTER
x2 unsuccessful fax. Called and Dr. Kathy Martinez and they suggested we mail the referral. Referral mailed to [address], [address] [facility] [address].

## 2019-03-08 RX ORDER — CLOPIDOGREL BISULFATE 75 MG/1
TABLET ORAL
Qty: 90 TABLET | Refills: 1 | Status: SHIPPED | OUTPATIENT
Start: 2019-03-08 | End: 2019-03-26

## 2019-03-08 RX ORDER — ATENOLOL 25 MG/1
TABLET ORAL
Qty: 90 TABLET | Refills: 1 | Status: SHIPPED | OUTPATIENT
Start: 2019-03-08 | End: 2019-03-26

## 2019-03-08 NOTE — TELEPHONE ENCOUNTER
Refill request rec'd from [facility] for:  Clopidogrel Tab 75MG, Qty 90  Atenolol Tab 25MG, Qty 90  Tasked to [employer]

## 2019-03-12 RX ORDER — OMEPRAZOLE 20 MG/1
CAPSULE, DELAYED RELEASE ORAL
Qty: 90 CAPSULE | Refills: 0 | Status: SHIPPED | OUTPATIENT
Start: 2019-03-12 | End: 2019-03-26

## 2019-03-23 ENCOUNTER — MA CHART PREP (OUTPATIENT)
Dept: FAMILY MEDICINE CLINIC | Facility: CLINIC | Age: 84
End: 2019-03-23

## 2019-03-23 PROBLEM — Z86.79 HISTORY OF ATRIAL FIBRILLATION: Status: ACTIVE | Noted: 2019-03-23

## 2019-03-23 PROBLEM — I70.0 ATHEROSCLEROSIS OF AORTA (HCC): Status: ACTIVE | Noted: 2019-03-23

## 2019-03-23 PROBLEM — Z87.19 HISTORY OF GI BLEED: Status: ACTIVE | Noted: 2019-03-23

## 2019-03-23 PROBLEM — I25.10 CAD (CORONARY ARTERY DISEASE): Status: ACTIVE | Noted: 2019-03-23

## 2019-03-26 ENCOUNTER — OFFICE VISIT (OUTPATIENT)
Dept: INTERNAL MEDICINE CLINIC | Facility: CLINIC | Age: 84
End: 2019-03-26
Payer: COMMERCIAL

## 2019-03-26 VITALS
TEMPERATURE: 98 F | HEIGHT: 71 IN | HEART RATE: 72 BPM | WEIGHT: 169.5 LBS | BODY MASS INDEX: 23.73 KG/M2 | OXYGEN SATURATION: 99 % | SYSTOLIC BLOOD PRESSURE: 128 MMHG | DIASTOLIC BLOOD PRESSURE: 78 MMHG

## 2019-03-26 DIAGNOSIS — Z95.5 S/P CORONARY ARTERY STENT PLACEMENT: ICD-10-CM

## 2019-03-26 DIAGNOSIS — Z00.00 MEDICARE ANNUAL WELLNESS VISIT, SUBSEQUENT: Primary | ICD-10-CM

## 2019-03-26 DIAGNOSIS — Z95.0 STATUS CARDIAC PACEMAKER: ICD-10-CM

## 2019-03-26 DIAGNOSIS — E78.5 HYPERLIPIDEMIA, UNSPECIFIED HYPERLIPIDEMIA TYPE: ICD-10-CM

## 2019-03-26 DIAGNOSIS — Z86.79 HISTORY OF ATRIAL FIBRILLATION: ICD-10-CM

## 2019-03-26 DIAGNOSIS — I25.10 CORONARY ARTERY DISEASE INVOLVING NATIVE CORONARY ARTERY OF NATIVE HEART WITHOUT ANGINA PECTORIS: ICD-10-CM

## 2019-03-26 DIAGNOSIS — Z00.00 ROUTINE HEALTH MAINTENANCE: ICD-10-CM

## 2019-03-26 PROBLEM — H35.30 MACULAR DEGENERATION: Status: RESOLVED | Noted: 2019-03-26 | Resolved: 2019-03-26

## 2019-03-26 PROBLEM — I70.0 ATHEROSCLEROSIS OF AORTA (HCC): Status: RESOLVED | Noted: 2019-03-23 | Resolved: 2019-03-26

## 2019-03-26 PROBLEM — Z87.19 HISTORY OF GI BLEED: Status: RESOLVED | Noted: 2019-03-23 | Resolved: 2019-03-26

## 2019-03-26 PROCEDURE — G0439 PPPS, SUBSEQ VISIT: HCPCS | Performed by: INTERNAL MEDICINE

## 2019-03-26 PROCEDURE — 96160 PT-FOCUSED HLTH RISK ASSMT: CPT | Performed by: INTERNAL MEDICINE

## 2019-03-26 PROCEDURE — 99397 PER PM REEVAL EST PAT 65+ YR: CPT | Performed by: INTERNAL MEDICINE

## 2019-03-26 RX ORDER — CLOPIDOGREL BISULFATE 75 MG/1
75 TABLET ORAL
Qty: 90 TABLET | Refills: 3 | Status: SHIPPED | OUTPATIENT
Start: 2019-03-26 | End: 2020-05-04

## 2019-03-26 RX ORDER — SIMVASTATIN 40 MG
40 TABLET ORAL NIGHTLY
Qty: 90 TABLET | Refills: 3 | Status: SHIPPED | OUTPATIENT
Start: 2019-03-26 | End: 2020-05-04

## 2019-03-26 RX ORDER — OMEPRAZOLE 20 MG/1
CAPSULE, DELAYED RELEASE ORAL
Qty: 90 CAPSULE | Refills: 3 | Status: SHIPPED | OUTPATIENT
Start: 2019-03-26 | End: 2020-05-04

## 2019-03-26 RX ORDER — LORATADINE 10 MG/1
10 TABLET ORAL DAILY
Qty: 90 TABLET | Refills: 3 | Status: SHIPPED | OUTPATIENT
Start: 2019-03-26 | End: 2020-01-01

## 2019-03-26 RX ORDER — ATENOLOL 25 MG/1
25 TABLET ORAL
Qty: 90 TABLET | Refills: 3 | Status: SHIPPED | OUTPATIENT
Start: 2019-03-26 | End: 2020-05-04

## 2019-03-26 RX ORDER — LORAZEPAM 0.5 MG/1
TABLET ORAL
Qty: 30 TABLET | Refills: 0 | Status: SHIPPED
Start: 2019-03-26 | End: 2019-06-14

## 2019-03-26 NOTE — PROGRESS NOTES
Raphael Griffin is a 80year old male   HPI:   Pt.presents for the following problems. Patient here for Medicare annual wellness visit. He does have some lower lumbar back pain. He works at home with his wife who has multiple sclerosis.   He needs to l AREDS) Oral Tab Take 1 tablet by mouth 2 (two) times daily. Disp:  Rfl:    LORazepam 0.5 MG Oral Tab 1 tablet daily as needed for anxiety Disp: 30 tablet Rfl: 0   loratadine 10 MG Oral Tab Take 10 mg by mouth daily.  Disp:  Rfl:    IRON OR Take 1 tablet by apparent distress  SKIN:  no rashes, no suspicious lesions in areas examined. HEENT:  atraumatic,  Pharynx without exudate or erythema. EYES;  PERRL. conjunctiva are clear  NECK:  Supple. no adenopathy,    LUNGS:  clear to auscultation.  effort normal  C Assessment          Have you fallen in the last 12 months?: 0-No                                        Fall/Risk Scorin          Depression Screening (PHQ-2/PHQ-9): Over the LAST 2 WEEKS   Little interest or pleasure in doing things (over the last two list are recommended by your physician but may not be covered, or covered at this frequency, by your insurer. Please check with your insurance carrier before scheduling to verify coverage.     PREVENTATIVE SERVICES  INDICATIONS AND SCHEDULE Internal Lab or Monitoring of Persistent     Medications (ACE/ARB, digoxin, diuretics)    Potassium  Annually Potassium (mmol/L)   Date Value   01/16/2019 4.6     POTASSIUM (P) (mmol/L)   Date Value   12/10/2015 3.9    No flowsheet data found.     Creatinine  Annually Crea wishes follow-up in a year. Did review his labs from January. He did have a blood sugar of 120. Recheck this with his yearly labs.     Myranda Parnell MD  3/26/2019  4:55 PM

## 2019-03-27 ENCOUNTER — ANCILLARY PROCEDURE (OUTPATIENT)
Dept: CARDIOLOGY | Age: 84
End: 2019-03-27
Attending: INTERNAL MEDICINE

## 2019-03-27 DIAGNOSIS — Z45.018 PACEMAKER REPROGRAMMING/CHECK: ICD-10-CM

## 2019-03-27 PROCEDURE — 93296 REM INTERROG EVL PM/IDS: CPT | Performed by: INTERNAL MEDICINE

## 2019-03-27 PROCEDURE — 93294 REM INTERROG EVL PM/LDLS PM: CPT | Performed by: INTERNAL MEDICINE

## 2019-03-28 ENCOUNTER — TELEPHONE (OUTPATIENT)
Dept: INTERNAL MEDICINE CLINIC | Facility: CLINIC | Age: 84
End: 2019-03-28

## 2019-03-28 NOTE — TELEPHONE ENCOUNTER
[facility] faxed clarification request for Lorazepam   - requesting manual signature is faxed back    In purple folder

## 2019-06-14 RX ORDER — LORAZEPAM 0.5 MG/1
TABLET ORAL
Qty: 30 TABLET | Refills: 0 | Status: SHIPPED
Start: 2019-06-14 | End: 2020-01-01

## 2019-06-14 NOTE — TELEPHONE ENCOUNTER
The above refill request is for a controlled substance. Please review pended medication order. Print and sign for staff to fax to pharmacy.

## 2019-07-02 PROCEDURE — 93294 REM INTERROG EVL PM/LDLS PM: CPT | Performed by: INTERNAL MEDICINE

## 2019-07-02 PROCEDURE — 93296 REM INTERROG EVL PM/IDS: CPT | Performed by: INTERNAL MEDICINE

## 2019-07-23 ENCOUNTER — ANCILLARY PROCEDURE (OUTPATIENT)
Dept: CARDIOLOGY | Age: 84
End: 2019-07-23
Attending: INTERNAL MEDICINE

## 2019-07-23 ENCOUNTER — ANCILLARY ORDERS (OUTPATIENT)
Dept: CARDIOLOGY | Age: 84
End: 2019-07-23

## 2019-07-23 DIAGNOSIS — Z95.0 CARDIAC PACEMAKER IN SITU: ICD-10-CM

## 2019-09-06 ENCOUNTER — APPOINTMENT (OUTPATIENT)
Dept: CARDIOLOGY | Age: 84
End: 2019-09-06
Attending: INTERNAL MEDICINE

## 2019-10-08 PROCEDURE — 93296 REM INTERROG EVL PM/IDS: CPT | Performed by: INTERNAL MEDICINE

## 2019-10-08 PROCEDURE — 93294 REM INTERROG EVL PM/LDLS PM: CPT | Performed by: INTERNAL MEDICINE

## 2019-10-23 ENCOUNTER — TELEPHONE (OUTPATIENT)
Dept: INTERNAL MEDICINE CLINIC | Facility: CLINIC | Age: 84
End: 2019-10-23

## 2019-10-23 DIAGNOSIS — Z01.00 EXAMINATION OF EYES AND VISION: Primary | ICD-10-CM

## 2019-10-23 NOTE — TELEPHONE ENCOUNTER
Pt. Is requesting a referral for Dr. Meeta Prakash (opthalamology) ph. # [phone] routed to clinical

## 2019-10-24 NOTE — TELEPHONE ENCOUNTER
Xander Kuhn called back and informed Dr Michelle Garrett signed off on referral for patient for Dr Emiliano Castañeda. No further questions noted.

## 2019-10-24 NOTE — TELEPHONE ENCOUNTER
Pt wife Apolinar Hart returning nurse call (not available) please call [phone]   Tasked to nursing

## 2019-10-30 ENCOUNTER — TELEPHONE (OUTPATIENT)
Dept: CARDIOLOGY | Age: 84
End: 2019-10-30

## 2019-10-30 ENCOUNTER — ANCILLARY PROCEDURE (OUTPATIENT)
Dept: CARDIOLOGY | Age: 84
End: 2019-10-30
Attending: INTERNAL MEDICINE

## 2019-10-30 DIAGNOSIS — Z95.0 PACEMAKER: ICD-10-CM

## 2020-01-01 ENCOUNTER — TELEPHONE (OUTPATIENT)
Dept: INTERNAL MEDICINE CLINIC | Facility: CLINIC | Age: 85
End: 2020-01-01

## 2020-01-01 ENCOUNTER — LAB ENCOUNTER (OUTPATIENT)
Dept: LAB | Age: 85
End: 2020-01-01
Attending: INTERNAL MEDICINE
Payer: MEDICARE

## 2020-01-01 ENCOUNTER — OFFICE VISIT (OUTPATIENT)
Dept: SURGERY | Facility: CLINIC | Age: 85
End: 2020-01-01
Payer: COMMERCIAL

## 2020-01-01 VITALS
RESPIRATION RATE: 16 BRPM | HEART RATE: 70 BPM | HEIGHT: 71 IN | TEMPERATURE: 98 F | BODY MASS INDEX: 23.52 KG/M2 | WEIGHT: 168 LBS | SYSTOLIC BLOOD PRESSURE: 136 MMHG | DIASTOLIC BLOOD PRESSURE: 80 MMHG

## 2020-01-01 DIAGNOSIS — R33.9 INCOMPLETE BLADDER EMPTYING: ICD-10-CM

## 2020-01-01 DIAGNOSIS — N39.41 URGE INCONTINENCE: ICD-10-CM

## 2020-01-01 DIAGNOSIS — Z95.5 S/P CORONARY ARTERY STENT PLACEMENT: ICD-10-CM

## 2020-01-01 DIAGNOSIS — D64.9 ANEMIA, UNSPECIFIED TYPE: ICD-10-CM

## 2020-01-01 DIAGNOSIS — R35.1 NOCTURIA: ICD-10-CM

## 2020-01-01 DIAGNOSIS — Z01.00 ROUTINE EYE EXAM: Primary | ICD-10-CM

## 2020-01-01 DIAGNOSIS — I25.10 CORONARY ARTERY DISEASE INVOLVING NATIVE CORONARY ARTERY OF NATIVE HEART WITHOUT ANGINA PECTORIS: ICD-10-CM

## 2020-01-01 DIAGNOSIS — R36.1 HEMOSPERMIA: ICD-10-CM

## 2020-01-01 DIAGNOSIS — Z79.82 LONG TERM CURRENT USE OF ASPIRIN: ICD-10-CM

## 2020-01-01 DIAGNOSIS — N40.1 BENIGN PROSTATIC HYPERPLASIA WITH URINARY FREQUENCY: Primary | ICD-10-CM

## 2020-01-01 DIAGNOSIS — R35.0 BENIGN PROSTATIC HYPERPLASIA WITH URINARY FREQUENCY: Primary | ICD-10-CM

## 2020-01-01 DIAGNOSIS — Z95.0 STATUS CARDIAC PACEMAKER: ICD-10-CM

## 2020-01-01 DIAGNOSIS — R33.9 URINARY RETENTION: ICD-10-CM

## 2020-01-01 DIAGNOSIS — Z79.01 ON LONG TERM CLOPIDOGREL THERAPY: ICD-10-CM

## 2020-01-01 LAB
BASOPHILS # BLD AUTO: 0.04 X10(3) UL (ref 0–0.2)
BASOPHILS NFR BLD AUTO: 0.6 %
BILIRUB UR QL: NEGATIVE
COLOR UR: YELLOW
DEPRECATED HBV CORE AB SER IA-ACNC: 73.7 NG/ML (ref 30–530)
DEPRECATED RDW RBC AUTO: 49 FL (ref 35.1–46.3)
EOSINOPHIL # BLD AUTO: 0.19 X10(3) UL (ref 0–0.7)
EOSINOPHIL NFR BLD AUTO: 2.9 %
ERYTHROCYTE [DISTWIDTH] IN BLOOD BY AUTOMATED COUNT: 14.5 % (ref 11–15)
GLUCOSE UR-MCNC: NEGATIVE MG/DL
HCT VFR BLD AUTO: 41.1 % (ref 39–53)
HGB BLD-MCNC: 13.5 G/DL (ref 13–17.5)
HGB UR QL STRIP.AUTO: NEGATIVE
IMM GRANULOCYTES # BLD AUTO: 0.01 X10(3) UL (ref 0–1)
IMM GRANULOCYTES NFR BLD: 0.2 %
IRON SATURATION: 20 % (ref 20–50)
IRON SERPL-MCNC: 62 UG/DL (ref 65–175)
KETONES UR-MCNC: NEGATIVE MG/DL
LEUKOCYTE ESTERASE UR QL STRIP.AUTO: NEGATIVE
LYMPHOCYTES # BLD AUTO: 1.12 X10(3) UL (ref 1–4)
LYMPHOCYTES NFR BLD AUTO: 17 %
MCH RBC QN AUTO: 30.1 PG (ref 26–34)
MCHC RBC AUTO-ENTMCNC: 32.8 G/DL (ref 31–37)
MCV RBC AUTO: 91.7 FL (ref 80–100)
MONOCYTES # BLD AUTO: 0.49 X10(3) UL (ref 0.1–1)
MONOCYTES NFR BLD AUTO: 7.4 %
NEUTROPHILS # BLD AUTO: 4.73 X10 (3) UL (ref 1.5–7.7)
NEUTROPHILS # BLD AUTO: 4.73 X10(3) UL (ref 1.5–7.7)
NEUTROPHILS NFR BLD AUTO: 71.9 %
NITRITE UR QL STRIP.AUTO: NEGATIVE
NON GYNE INTERPRETATION: NEGATIVE
PH UR: 6 [PH] (ref 5–8)
PLATELET # BLD AUTO: 240 10(3)UL (ref 150–450)
PROT UR-MCNC: NEGATIVE MG/DL
RBC # BLD AUTO: 4.48 X10(6)UL (ref 3.8–5.8)
RBC #/AREA URNS AUTO: <1 /HPF
SP GR UR STRIP: 1.01 (ref 1–1.03)
TOTAL IRON BINDING CAPACITY: 308 UG/DL (ref 240–450)
TRANSFERRIN SERPL-MCNC: 207 MG/DL (ref 200–360)
UROBILINOGEN UR STRIP-ACNC: <2
VIT B12 SERPL-MCNC: 470 PG/ML (ref 193–986)
WBC # BLD AUTO: 6.6 X10(3) UL (ref 4–11)
WBC #/AREA URNS AUTO: 1 /HPF

## 2020-01-01 PROCEDURE — 36415 COLL VENOUS BLD VENIPUNCTURE: CPT | Performed by: INTERNAL MEDICINE

## 2020-01-01 PROCEDURE — 51798 US URINE CAPACITY MEASURE: CPT | Performed by: UROLOGY

## 2020-01-01 PROCEDURE — 99204 OFFICE O/P NEW MOD 45 MIN: CPT | Performed by: UROLOGY

## 2020-01-01 PROCEDURE — 3008F BODY MASS INDEX DOCD: CPT | Performed by: UROLOGY

## 2020-01-01 PROCEDURE — 82728 ASSAY OF FERRITIN: CPT | Performed by: INTERNAL MEDICINE

## 2020-01-01 PROCEDURE — 85025 COMPLETE CBC W/AUTO DIFF WBC: CPT | Performed by: INTERNAL MEDICINE

## 2020-01-01 PROCEDURE — 87086 URINE CULTURE/COLONY COUNT: CPT | Performed by: INTERNAL MEDICINE

## 2020-01-01 PROCEDURE — 84466 ASSAY OF TRANSFERRIN: CPT | Performed by: INTERNAL MEDICINE

## 2020-01-01 PROCEDURE — 83540 ASSAY OF IRON: CPT | Performed by: INTERNAL MEDICINE

## 2020-01-01 PROCEDURE — 3075F SYST BP GE 130 - 139MM HG: CPT | Performed by: UROLOGY

## 2020-01-01 PROCEDURE — 82607 VITAMIN B-12: CPT

## 2020-01-01 PROCEDURE — 81001 URINALYSIS AUTO W/SCOPE: CPT | Performed by: INTERNAL MEDICINE

## 2020-01-01 PROCEDURE — 3079F DIAST BP 80-89 MM HG: CPT | Performed by: UROLOGY

## 2020-01-01 RX ORDER — TAMSULOSIN HYDROCHLORIDE 0.4 MG/1
0.4 CAPSULE ORAL DAILY
Qty: 365 CAPSULE | Refills: 3 | Status: SHIPPED | OUTPATIENT
Start: 2020-01-01 | End: 2020-01-01

## 2020-01-01 RX ORDER — SIMVASTATIN 40 MG
40 TABLET ORAL NIGHTLY
Qty: 90 TABLET | Refills: 1 | Status: SHIPPED | OUTPATIENT
Start: 2020-01-01 | End: 2021-01-01

## 2020-01-01 RX ORDER — CLOPIDOGREL BISULFATE 75 MG/1
75 TABLET ORAL DAILY
Qty: 90 TABLET | Refills: 1 | Status: SHIPPED | OUTPATIENT
Start: 2020-01-01 | End: 2021-01-01

## 2020-01-01 RX ORDER — ATENOLOL 25 MG/1
25 TABLET ORAL DAILY
Qty: 90 TABLET | Refills: 1 | Status: SHIPPED | OUTPATIENT
Start: 2020-01-01 | End: 2021-01-01

## 2020-01-01 RX ORDER — LORATADINE 10 MG/1
10 TABLET ORAL DAILY
Qty: 90 TABLET | Refills: 1 | Status: SHIPPED | OUTPATIENT
Start: 2020-01-01

## 2020-01-01 RX ORDER — ATENOLOL 25 MG/1
25 TABLET ORAL DAILY
Qty: 90 TABLET | Refills: 1 | OUTPATIENT
Start: 2020-01-01

## 2020-01-01 RX ORDER — OMEPRAZOLE 20 MG/1
20 CAPSULE, DELAYED RELEASE ORAL EVERY MORNING
Qty: 90 CAPSULE | Refills: 1 | OUTPATIENT
Start: 2020-01-01

## 2020-01-01 RX ORDER — LORAZEPAM 0.5 MG/1
TABLET ORAL
Qty: 30 TABLET | Refills: 0 | Status: SHIPPED | OUTPATIENT
Start: 2020-01-01 | End: 2021-01-01

## 2020-01-01 RX ORDER — OMEPRAZOLE 20 MG/1
20 CAPSULE, DELAYED RELEASE ORAL EVERY MORNING
Qty: 90 CAPSULE | Refills: 1 | Status: SHIPPED | OUTPATIENT
Start: 2020-01-01 | End: 2021-01-01

## 2020-02-20 ENCOUNTER — TELEPHONE (OUTPATIENT)
Dept: CARDIOLOGY | Age: 85
End: 2020-02-20

## 2020-02-24 ENCOUNTER — OFFICE VISIT (OUTPATIENT)
Dept: INTERNAL MEDICINE CLINIC | Facility: CLINIC | Age: 85
End: 2020-02-24
Payer: COMMERCIAL

## 2020-02-24 VITALS
BODY MASS INDEX: 23.66 KG/M2 | HEIGHT: 71 IN | OXYGEN SATURATION: 99 % | TEMPERATURE: 98 F | HEART RATE: 70 BPM | WEIGHT: 169 LBS | SYSTOLIC BLOOD PRESSURE: 128 MMHG | DIASTOLIC BLOOD PRESSURE: 86 MMHG

## 2020-02-24 DIAGNOSIS — E78.5 HYPERLIPIDEMIA, UNSPECIFIED HYPERLIPIDEMIA TYPE: ICD-10-CM

## 2020-02-24 DIAGNOSIS — I25.10 CORONARY ARTERY DISEASE INVOLVING NATIVE CORONARY ARTERY OF NATIVE HEART WITHOUT ANGINA PECTORIS: ICD-10-CM

## 2020-02-24 DIAGNOSIS — Z23 NEED FOR SHINGLES VACCINE: ICD-10-CM

## 2020-02-24 DIAGNOSIS — Z95.5 S/P CORONARY ARTERY STENT PLACEMENT: ICD-10-CM

## 2020-02-24 DIAGNOSIS — Z00.00 MEDICARE ANNUAL WELLNESS VISIT, SUBSEQUENT: Primary | ICD-10-CM

## 2020-02-24 DIAGNOSIS — Z23 FLU VACCINE NEED: ICD-10-CM

## 2020-02-24 DIAGNOSIS — Z95.0 STATUS CARDIAC PACEMAKER: ICD-10-CM

## 2020-02-24 DIAGNOSIS — Z23 NEED FOR VACCINATION AGAINST STREPTOCOCCUS PNEUMONIAE: ICD-10-CM

## 2020-02-24 PROBLEM — K92.2 GIB (GASTROINTESTINAL BLEEDING): Status: ACTIVE | Noted: 2020-02-24

## 2020-02-24 PROBLEM — Z86.010 HISTORY OF COLONIC POLYPS: Status: ACTIVE | Noted: 2020-02-24

## 2020-02-24 PROCEDURE — 99212 OFFICE O/P EST SF 10 MIN: CPT | Performed by: INTERNAL MEDICINE

## 2020-02-24 PROCEDURE — 99214 OFFICE O/P EST MOD 30 MIN: CPT | Performed by: INTERNAL MEDICINE

## 2020-02-24 RX ORDER — ACETAMINOPHEN 500 MG
500 TABLET ORAL EVERY 6 HOURS PRN
COMMUNITY
End: 2021-01-01

## 2020-02-24 RX ORDER — OMEGA-3 FATTY ACIDS CAP DELAYED RELEASE 1000 MG 1000 MG
1 CAPSULE DELAYED RELEASE ORAL DAILY
COMMUNITY

## 2020-02-24 NOTE — PROGRESS NOTES
Ernesto Pizarro is a 80year old male   HPI:   Pt.presents for the following problems. Patient generally feels well. Is no physical complaints. He does a lot with his wife who is in a wheelchair at home. He does a lot of lifting.   He does have a low tablet 3   • omeprazole 20 MG Oral Capsule Delayed Release TAKE 1 CAPSULE BY MOUTH  EVERY MORNING 90 capsule 3   • loratadine 10 MG Oral Tab Take 1 tablet (10 mg total) by mouth daily.  90 tablet 3   • Multiple Vitamins-Minerals (PRESERVISION AREDS) Oral Ta dizzyness  PSYCHE:  Voices no  depression or anxiety    EXAM:   /86 (BP Location: Right arm, Patient Position: Sitting, Cuff Size: adult)   Pulse 70   Temp 97.9 °F (36.6 °C) (Oral)   Ht 5' 11\" (1.803 m)   Wt 169 lb (76.7 kg)   SpO2 99%   BMI 23.57 k Fall/Risk Assessment                                                              Depression Screening (PHQ-2/PHQ-9): Over the LAST 2 WEEKS                      Advance Directives                Hearing Assessment (Required for AWV/SWV)    isp Maintenance if applicable   Immunizations      Influenza No orders found for this or any previous visit. Update Immunization Activity if applicable    Pneumococcal No orders found for this or any previous visit.  Update Immunization Activity if applicable PLATELET  - COMP METABOLIC PANEL (14)  - LIPID PANEL  - TSH W REFLEX TO FREE T4    3. Hyperlipidemia, unspecified hyperlipidemia type  We will update lipids. On statin.  - TSH W REFLEX TO FREE T4    4.  S/P coronary artery stent placement  Status post tania

## 2020-04-21 ENCOUNTER — ANCILLARY PROCEDURE (OUTPATIENT)
Dept: CARDIOLOGY | Age: 85
End: 2020-04-21
Attending: INTERNAL MEDICINE

## 2020-04-21 DIAGNOSIS — Z95.0 CARDIAC PACEMAKER: ICD-10-CM

## 2020-04-21 PROCEDURE — 93296 REM INTERROG EVL PM/IDS: CPT | Performed by: INTERNAL MEDICINE

## 2020-04-21 PROCEDURE — 93294 REM INTERROG EVL PM/LDLS PM: CPT | Performed by: INTERNAL MEDICINE

## 2020-05-04 RX ORDER — SIMVASTATIN 40 MG
TABLET ORAL
Qty: 90 TABLET | Refills: 0 | Status: SHIPPED | OUTPATIENT
Start: 2020-05-04 | End: 2020-01-01

## 2020-05-04 RX ORDER — OMEPRAZOLE 20 MG/1
CAPSULE, DELAYED RELEASE ORAL
Qty: 90 CAPSULE | Refills: 0 | Status: SHIPPED | OUTPATIENT
Start: 2020-05-04 | End: 2020-01-01

## 2020-05-04 RX ORDER — CLOPIDOGREL BISULFATE 75 MG/1
TABLET ORAL
Qty: 90 TABLET | Refills: 0 | Status: SHIPPED | OUTPATIENT
Start: 2020-05-04 | End: 2020-01-01

## 2020-05-04 RX ORDER — ATENOLOL 25 MG/1
TABLET ORAL
Qty: 90 TABLET | Refills: 0 | Status: SHIPPED | OUTPATIENT
Start: 2020-05-04 | End: 2020-01-01

## 2020-05-04 NOTE — TELEPHONE ENCOUNTER
Called pt and reminded to get labs done--verbalized understanding    Requested Prescriptions     Signed Prescriptions Disp Refills   • CLOPIDOGREL BISULFATE 75 MG Oral Tab 90 tablet 0     Sig: TAKE 1 TABLET BY MOUTH ONCE DAILY     Authorizing Provider: Susannah Kayser

## 2020-06-18 ENCOUNTER — TELEPHONE (OUTPATIENT)
Dept: INTERNAL MEDICINE CLINIC | Facility: CLINIC | Age: 85
End: 2020-06-18

## 2020-06-18 DIAGNOSIS — I25.10 CORONARY ARTERY DISEASE INVOLVING NATIVE CORONARY ARTERY OF NATIVE HEART WITHOUT ANGINA PECTORIS: Primary | ICD-10-CM

## 2020-06-18 DIAGNOSIS — Z95.5 S/P CORONARY ARTERY STENT PLACEMENT: ICD-10-CM

## 2020-06-18 DIAGNOSIS — Z95.0 STATUS CARDIAC PACEMAKER: ICD-10-CM

## 2020-06-18 NOTE — TELEPHONE ENCOUNTER
Pt wife called  Requesting referral for pt to see Dr Lorenza Hicks for check for pacemaker  Please call when referral in place so pt can schedule appt  Tasked to nursing

## 2020-06-19 ENCOUNTER — TELEPHONE (OUTPATIENT)
Dept: INTERNAL MEDICINE CLINIC | Facility: CLINIC | Age: 85
End: 2020-06-19

## 2020-06-19 ENCOUNTER — APPOINTMENT (OUTPATIENT)
Dept: LAB | Age: 85
End: 2020-06-19
Attending: INTERNAL MEDICINE
Payer: MEDICARE

## 2020-06-19 DIAGNOSIS — D64.9 ANEMIA, UNSPECIFIED TYPE: Primary | ICD-10-CM

## 2020-06-19 PROCEDURE — 83540 ASSAY OF IRON: CPT | Performed by: INTERNAL MEDICINE

## 2020-06-19 PROCEDURE — 82607 VITAMIN B-12: CPT | Performed by: INTERNAL MEDICINE

## 2020-06-19 PROCEDURE — 84466 ASSAY OF TRANSFERRIN: CPT | Performed by: INTERNAL MEDICINE

## 2020-06-19 PROCEDURE — 80053 COMPREHEN METABOLIC PANEL: CPT | Performed by: INTERNAL MEDICINE

## 2020-06-19 PROCEDURE — 85025 COMPLETE CBC W/AUTO DIFF WBC: CPT | Performed by: INTERNAL MEDICINE

## 2020-06-19 PROCEDURE — 82728 ASSAY OF FERRITIN: CPT | Performed by: INTERNAL MEDICINE

## 2020-06-19 PROCEDURE — 84443 ASSAY THYROID STIM HORMONE: CPT | Performed by: INTERNAL MEDICINE

## 2020-06-19 PROCEDURE — 80061 LIPID PANEL: CPT | Performed by: INTERNAL MEDICINE

## 2020-06-19 PROCEDURE — 36415 COLL VENOUS BLD VENIPUNCTURE: CPT | Performed by: INTERNAL MEDICINE

## 2020-06-23 ENCOUNTER — TELEPHONE (OUTPATIENT)
Dept: INTERNAL MEDICINE CLINIC | Facility: CLINIC | Age: 85
End: 2020-06-23

## 2020-06-23 DIAGNOSIS — D64.9 ANEMIA, UNSPECIFIED TYPE: Primary | ICD-10-CM

## 2020-06-23 RX ORDER — CLOPIDOGREL BISULFATE 75 MG/1
TABLET ORAL
Qty: 90 TABLET | Refills: 0 | OUTPATIENT
Start: 2020-06-23

## 2020-06-23 RX ORDER — SIMVASTATIN 40 MG
TABLET ORAL
Qty: 90 TABLET | Refills: 0 | OUTPATIENT
Start: 2020-06-23

## 2020-06-23 RX ORDER — ATENOLOL 25 MG/1
TABLET ORAL
Qty: 90 TABLET | Refills: 0 | OUTPATIENT
Start: 2020-06-23

## 2020-06-23 RX ORDER — OMEPRAZOLE 20 MG/1
CAPSULE, DELAYED RELEASE ORAL
Qty: 90 CAPSULE | Refills: 0 | OUTPATIENT
Start: 2020-06-23

## 2020-06-23 NOTE — TELEPHONE ENCOUNTER
Spoke with patient's wife and patient and relayed message. Patient verbalized understanding of information/instructions and agreement with plan.

## 2020-06-23 NOTE — TELEPHONE ENCOUNTER
Please notify patient that his labs came out fairly acceptable however he does have a little bit of an anemia. He does have some low iron readings. He may be a little bit borderline low on his vitamin B12.   I think for the possible low iron I would like

## 2020-06-24 NOTE — TELEPHONE ENCOUNTER
Current refill request refused due to refill is either a duplicate request or has active refills at the pharmacy. Check previous templates.     Requested Prescriptions     Refused Prescriptions Disp Refills   • SIMVASTATIN 40 MG Oral Tab [Pharmacy Med Name

## 2020-07-13 ENCOUNTER — TELEPHONE (OUTPATIENT)
Dept: INTERNAL MEDICINE CLINIC | Facility: CLINIC | Age: 85
End: 2020-07-13

## 2020-07-13 DIAGNOSIS — R39.9 LOWER URINARY TRACT SYMPTOMS: Primary | ICD-10-CM

## 2020-07-13 NOTE — TELEPHONE ENCOUNTER
TO Dr. Ritchie Dears to advise on the referral. I do not see this placed in the past or mentioned in last few OV notes. Thanks!

## 2020-07-13 NOTE — TELEPHONE ENCOUNTER
Wife, Carmita Mejia, calling to requesting a referral from Dr Gustavo Hill for the patient to see Dr Brigid Barrett. Patient has seen the doctor before, looking to have a complete check up with Dr Brigid Barrett.      Best call back: [phone]

## 2020-07-13 NOTE — TELEPHONE ENCOUNTER
I can place referral.  Please ask either patient or wife the diagnosis that we can place on referral.  Also please ask either patient or wife the status of him seeing Dr. Jake Juárez from our last telephone message in regards to his anemia.   Lastly he kartik

## 2020-08-04 ENCOUNTER — TELEPHONE (OUTPATIENT)
Dept: INTERNAL MEDICINE CLINIC | Facility: CLINIC | Age: 85
End: 2020-08-04

## 2020-08-04 DIAGNOSIS — R39.9 LOWER URINARY TRACT SYMPTOMS: Primary | ICD-10-CM

## 2020-08-04 NOTE — TELEPHONE ENCOUNTER
Yes.  Appointment with Selin Noble would be fine. I also placed order for urinalysis and urine culture. ( Steve Benson, [place]. Thank you.  Dr. Jose Luis Truong )

## 2020-08-04 NOTE — TELEPHONE ENCOUNTER
To Dr. Crissy Page - see pt's wife message below. Pt doesn't want labs - states his wife worries about everything. Pt has 9/14/20 with Dr. Love Has - unable to get in sooner. Offered appt with you, pt states, \"Dr. Love Has is the one I need to see, I would go today if I could get in\". Spoke with Dr. Ivan Middleton' office  - no openings with Dr. Ab Lewis but they suggested Hanane Li, NP: [phone]. Shall I call pt with that offer?

## 2020-08-05 ENCOUNTER — DOCUMENTATION (OUTPATIENT)
Dept: CARDIOLOGY | Age: 85
End: 2020-08-05

## 2020-08-05 NOTE — TELEPHONE ENCOUNTER
Please advise - called patient who states he has frequent urination . He states he does not want to see DR Austin ,but he will go for labs this week - to DR. JORDAN

## 2020-08-12 NOTE — TELEPHONE ENCOUNTER
Please notify patient his urine is clear. No infection. If still having urine symptoms, he should see a urologist. Dr. Jhony Pacheco.

## 2020-08-13 NOTE — TELEPHONE ENCOUNTER
Dr. Scarlet Arana message relayed to pt who verbalized understanding. New HIPPA form for update mailed to pt's home.

## 2020-09-14 NOTE — PATIENT INSTRUCTIONS
Tahira Adams M.D.      1.  Urine specimen today for cytology--we will notify you of the results    2.   Please start tamsulosin 0.4 mg daily--purposes to improve bladder emptying, hopefully lessen the number of judie any water, will cause your body to manufacture more urine and urinate more often at night; if your mouth is very dry, gargle with water and spit it out in the sink.     5.  If you find yourself to be very thirsty before bedtime, then reexamine what you are

## 2020-09-14 NOTE — PROGRESS NOTES
Levi Alfredo is a 80year old male. HPI:   Patient presents with:  Urinary Symptoms: pt states he is not empty his bladder,   Nocturia: 2-3 times  Back Pain: lower back pain       History provided by patient. Referred by his PCP, Dr. Claudia Durant.        You Haider asymptomatic. He is unaware of anything that makes this better or worse. The patient feels this is stable as he is currently asymptomatic.      Anticoagulated   Patient is currently taking Plavix 75 mg daily and aspirin 81 mg daily due to history of atrial [facility]) cap Take 1 capsule (0.4 mg total) by mouth daily.  Take 1/2 hour following the same meal each day 365 capsule 3   • CLOPIDOGREL BISULFATE 75 MG Oral Tab TAKE 1 TABLET BY MOUTH ONCE DAILY 90 tablet 0   • ATENOLOL 25 MG Oral Tab TAKE 1 TABLET BY MOUT hearing loss and nasal drainage  Eyes:  Negative for eye discharge and vision loss  Hema/Lymph:  Negative for easy bleeding and easy bruising  Integumentary:  Negative for pruritus and rash  Musculoskeletal:  Negative for bone/joint symptoms  Psychiatric: °F (36.6 °C)   TempSrc: Oral   Weight: 168 lb (76.2 kg)   Height: 5' 11\" (1.803 m)       Body mass index is 23.43 kg/m².         LABORATORIES    08/11/2020 Urine culture = no growth 18-24 hrs; UA blood = negative; WBC = 1; RBC = 1; Bacteria = few   06/19/2 urine after he is done urinating. Patient states that he wears one pad daily after he urinates to stay dry.  He decides to start tamsulosin 0.4 mg daily and continue to wear pads daily to stay dry.     (R36.1) Hemospermia  Problem started 07/2020 when abbi tamsulosin 0.4 mg daily--purposes to improve bladder emptying, hopefully lessen the number of time you wake up at night to urinate; mildly improved the strength of your stream.  IF you were to feel unpleasant worry some lightheadedness upon standing up [employer] sink.    5.  If you find yourself to be very thirsty before bedtime, then reexamine what you are having for dinner--it is likely to salty; salty food for dinner will motivate you to drink more liquids before bedtime and that will cause you to wake up more 9/14/2020, 5:34 PM

## 2020-09-15 NOTE — TELEPHONE ENCOUNTER
MD Odette Shaw MD Dr. Janna Corin,   I evaluated Cuong Delgado in consultation today. Michelle Speaker voiding dysfunction; moderate BPH; bladder scan residual 294 mL so not emptying bladder.  We discussed benefits and risks of tams

## 2020-09-25 NOTE — TELEPHONE ENCOUNTER
Pt requesting RX renewal for medications:  Atenolol  Clopidogrel  Loratadine  Lorazepam  Omeprazole   Simvastatin  pt uses [facility]  Tasked to [employer]

## 2020-10-26 NOTE — TELEPHONE ENCOUNTER
Wife, [place], calling to request two referrals. 1. Dr Anola Barthel   [address] [facility] [address], [place]  Ph # [phone]    2.  Dr Windy Gonzales  [address] [facility]  [address] [facility]  Ph # [phone]    Best call back: [phone] (dyana

## 2020-11-06 NOTE — TELEPHONE ENCOUNTER
Patient is calling to check on the status of his referral    Dr Tawanda Stevenson is scheduled on 11/17  Dr Skyler Mckinley is scheduled on 11/23    Please call patient as soon as they are authorized [phone]

## 2020-11-06 NOTE — TELEPHONE ENCOUNTER
Called patient and explained that referrals are still open and once authorized managed  Care will give him a call and fax referrals to physicians - verbalized understanding    To [facility] please keep us updated  And patient also thanks

## 2020-12-18 NOTE — TELEPHONE ENCOUNTER
Current refill request refused due to refill is either a duplicate request or has active refills at the pharmacy. Check previous templates.     Requested Prescriptions     Refused Prescriptions Disp Refills   • atenolol 25 MG Oral Tab 90 tablet 1     Sig:

## 2021-01-01 ENCOUNTER — APPOINTMENT (OUTPATIENT)
Dept: GENERAL RADIOLOGY | Facility: HOSPITAL | Age: 86
DRG: 056 | End: 2021-01-01
Attending: EMERGENCY MEDICINE
Payer: MEDICARE

## 2021-01-01 ENCOUNTER — TELEPHONE (OUTPATIENT)
Dept: INTERNAL MEDICINE CLINIC | Facility: CLINIC | Age: 86
End: 2021-01-01

## 2021-01-01 ENCOUNTER — APPOINTMENT (OUTPATIENT)
Dept: GENERAL RADIOLOGY | Facility: HOSPITAL | Age: 86
DRG: 056 | End: 2021-01-01
Attending: INTERNAL MEDICINE
Payer: MEDICARE

## 2021-01-01 ENCOUNTER — APPOINTMENT (OUTPATIENT)
Dept: ULTRASOUND IMAGING | Facility: HOSPITAL | Age: 86
DRG: 056 | End: 2021-01-01
Attending: INTERNAL MEDICINE
Payer: MEDICARE

## 2021-01-01 ENCOUNTER — APPOINTMENT (OUTPATIENT)
Dept: GENERAL RADIOLOGY | Facility: HOSPITAL | Age: 86
DRG: 056 | End: 2021-01-01
Attending: HOSPITALIST
Payer: MEDICARE

## 2021-01-01 ENCOUNTER — APPOINTMENT (OUTPATIENT)
Dept: GENERAL RADIOLOGY | Facility: HOSPITAL | Age: 86
DRG: 056 | End: 2021-01-01
Attending: CLINICAL NURSE SPECIALIST
Payer: MEDICARE

## 2021-01-01 ENCOUNTER — LAB ENCOUNTER (OUTPATIENT)
Dept: LAB | Age: 86
End: 2021-01-01
Attending: INTERNAL MEDICINE
Payer: MEDICARE

## 2021-01-01 ENCOUNTER — APPOINTMENT (OUTPATIENT)
Dept: CV DIAGNOSTICS | Facility: HOSPITAL | Age: 86
DRG: 056 | End: 2021-01-01
Attending: Other
Payer: MEDICARE

## 2021-01-01 ENCOUNTER — APPOINTMENT (OUTPATIENT)
Dept: ULTRASOUND IMAGING | Facility: HOSPITAL | Age: 86
DRG: 056 | End: 2021-01-01
Attending: Other
Payer: MEDICARE

## 2021-01-01 ENCOUNTER — OFFICE VISIT (OUTPATIENT)
Dept: INTERNAL MEDICINE CLINIC | Facility: CLINIC | Age: 86
End: 2021-01-01
Payer: COMMERCIAL

## 2021-01-01 ENCOUNTER — ANESTHESIA (OUTPATIENT)
Dept: MEDSURG UNIT | Facility: HOSPITAL | Age: 86
DRG: 056 | End: 2021-01-01
Payer: MEDICARE

## 2021-01-01 ENCOUNTER — APPOINTMENT (OUTPATIENT)
Dept: CT IMAGING | Facility: HOSPITAL | Age: 86
DRG: 056 | End: 2021-01-01
Attending: PHYSICIAN ASSISTANT
Payer: MEDICARE

## 2021-01-01 ENCOUNTER — APPOINTMENT (OUTPATIENT)
Dept: GENERAL RADIOLOGY | Facility: HOSPITAL | Age: 86
DRG: 056 | End: 2021-01-01
Attending: RADIOLOGY
Payer: MEDICARE

## 2021-01-01 ENCOUNTER — APPOINTMENT (OUTPATIENT)
Dept: CT IMAGING | Facility: HOSPITAL | Age: 86
DRG: 056 | End: 2021-01-01
Attending: Other
Payer: MEDICARE

## 2021-01-01 ENCOUNTER — APPOINTMENT (OUTPATIENT)
Dept: PICC SERVICES | Facility: HOSPITAL | Age: 86
DRG: 056 | End: 2021-01-01
Attending: INTERNAL MEDICINE
Payer: MEDICARE

## 2021-01-01 ENCOUNTER — APPOINTMENT (OUTPATIENT)
Dept: GENERAL RADIOLOGY | Facility: HOSPITAL | Age: 86
DRG: 056 | End: 2021-01-01
Attending: NURSE PRACTITIONER
Payer: MEDICARE

## 2021-01-01 ENCOUNTER — ANESTHESIA EVENT (OUTPATIENT)
Dept: MEDSURG UNIT | Facility: HOSPITAL | Age: 86
DRG: 056 | End: 2021-01-01
Payer: MEDICARE

## 2021-01-01 ENCOUNTER — APPOINTMENT (OUTPATIENT)
Dept: INTERVENTIONAL RADIOLOGY/VASCULAR | Facility: HOSPITAL | Age: 86
DRG: 056 | End: 2021-01-01
Attending: CLINICAL NURSE SPECIALIST
Payer: MEDICARE

## 2021-01-01 ENCOUNTER — APPOINTMENT (OUTPATIENT)
Dept: CT IMAGING | Facility: HOSPITAL | Age: 86
DRG: 056 | End: 2021-01-01
Attending: EMERGENCY MEDICINE
Payer: MEDICARE

## 2021-01-01 ENCOUNTER — HOSPITAL ENCOUNTER (INPATIENT)
Facility: HOSPITAL | Age: 86
LOS: 32 days | DRG: 056 | End: 2021-01-01
Attending: EMERGENCY MEDICINE | Admitting: INTERNAL MEDICINE
Payer: MEDICARE

## 2021-01-01 VITALS
WEIGHT: 217.81 LBS | HEART RATE: 70 BPM | SYSTOLIC BLOOD PRESSURE: 133 MMHG | BODY MASS INDEX: 31.18 KG/M2 | OXYGEN SATURATION: 89 % | HEIGHT: 70 IN | DIASTOLIC BLOOD PRESSURE: 68 MMHG | TEMPERATURE: 98 F | RESPIRATION RATE: 24 BRPM

## 2021-01-01 VITALS
TEMPERATURE: 98 F | DIASTOLIC BLOOD PRESSURE: 76 MMHG | HEART RATE: 73 BPM | WEIGHT: 163 LBS | SYSTOLIC BLOOD PRESSURE: 138 MMHG | OXYGEN SATURATION: 98 % | BODY MASS INDEX: 22.82 KG/M2 | HEIGHT: 71 IN

## 2021-01-01 DIAGNOSIS — Z00.00 MEDICARE ANNUAL WELLNESS VISIT, SUBSEQUENT: Primary | ICD-10-CM

## 2021-01-01 DIAGNOSIS — Z00.00 ROUTINE HEALTH MAINTENANCE: ICD-10-CM

## 2021-01-01 DIAGNOSIS — E78.5 HYPERLIPIDEMIA, UNSPECIFIED HYPERLIPIDEMIA TYPE: ICD-10-CM

## 2021-01-01 DIAGNOSIS — I25.10 CORONARY ARTERY DISEASE INVOLVING NATIVE CORONARY ARTERY OF NATIVE HEART WITHOUT ANGINA PECTORIS: ICD-10-CM

## 2021-01-01 DIAGNOSIS — N40.0 BENIGN PROSTATIC HYPERPLASIA, UNSPECIFIED WHETHER LOWER URINARY TRACT SYMPTOMS PRESENT: ICD-10-CM

## 2021-01-01 DIAGNOSIS — Z95.0 STATUS CARDIAC PACEMAKER: ICD-10-CM

## 2021-01-01 DIAGNOSIS — R36.1 HEMATOSPERMIA: ICD-10-CM

## 2021-01-01 DIAGNOSIS — D64.9 ANEMIA, UNSPECIFIED TYPE: Primary | ICD-10-CM

## 2021-01-01 DIAGNOSIS — Z23 NEED FOR VACCINATION: ICD-10-CM

## 2021-01-01 DIAGNOSIS — I63.9 ACUTE ISCHEMIC STROKE (HCC): Primary | ICD-10-CM

## 2021-01-01 DIAGNOSIS — Z95.5 S/P CORONARY ARTERY STENT PLACEMENT: ICD-10-CM

## 2021-01-01 LAB
ACETYLCHOLINE BINDING AB: 0 NMOL/L
ACETYLCHOLINE BINDING AB: 0 NMOL/L
ACETYLCHOLINE BLOCKING AB: 8 %
ALBUMIN SERPL-MCNC: 2.6 G/DL (ref 3.4–5)
ALBUMIN SERPL-MCNC: 3.1 G/DL (ref 3.4–5)
ALBUMIN SERPL-MCNC: 3.2 G/DL (ref 3.4–5)
ALBUMIN SERPL-MCNC: 3.3 G/DL (ref 3.4–5)
ALBUMIN SERPL-MCNC: 3.3 G/DL (ref 3.4–5)
ALBUMIN/GLOB SERPL: 1.8 {RATIO} (ref 1–2)
ALBUMIN/GLOB SERPL: 2.4 {RATIO} (ref 1–2)
ALDOLASE, SERUM: 6 U/L
ALP LIVER SERPL-CCNC: 52 U/L
ALP LIVER SERPL-CCNC: 62 U/L
ALT SERPL-CCNC: 47 U/L
ALT SERPL-CCNC: 74 U/L
ANA NUCLEOLAR TITR SER IF: 80 {TITER}
ANION GAP SERPL CALC-SCNC: 0 MMOL/L (ref 0–18)
ANION GAP SERPL CALC-SCNC: 1 MMOL/L (ref 0–18)
ANION GAP SERPL CALC-SCNC: 1 MMOL/L (ref 0–18)
ANION GAP SERPL CALC-SCNC: 10 MMOL/L (ref 0–18)
ANION GAP SERPL CALC-SCNC: 11 MMOL/L (ref 0–18)
ANION GAP SERPL CALC-SCNC: 2 MMOL/L (ref 0–18)
ANION GAP SERPL CALC-SCNC: 3 MMOL/L (ref 0–18)
ANION GAP SERPL CALC-SCNC: 4 MMOL/L (ref 0–18)
ANION GAP SERPL CALC-SCNC: 5 MMOL/L (ref 0–18)
ANION GAP SERPL CALC-SCNC: 6 MMOL/L (ref 0–18)
ANION GAP SERPL CALC-SCNC: 6 MMOL/L (ref 0–18)
ANION GAP SERPL CALC-SCNC: 7 MMOL/L (ref 0–18)
ANION GAP SERPL CALC-SCNC: 8 MMOL/L (ref 0–18)
ANION GAP SERPL CALC-SCNC: 9 MMOL/L (ref 0–18)
ANTIBODY SCREEN: NEGATIVE
AQUAPORIN-4 RECEPTOR ANTIBODY: <1.5 U/ML
AST SERPL-CCNC: 43 U/L (ref 15–37)
AST SERPL-CCNC: 73 U/L (ref 15–37)
BASE EXCESS BLD CALC-SCNC: -1.7 MMOL/L (ref ?–2)
BASE EXCESS BLD CALC-SCNC: -3.5 MMOL/L (ref ?–2)
BASE EXCESS BLD CALC-SCNC: -4.2 MMOL/L (ref ?–2)
BASE EXCESS BLD CALC-SCNC: -4.9 MMOL/L (ref ?–2)
BASE EXCESS BLD CALC-SCNC: -7.5 MMOL/L (ref ?–2)
BASE EXCESS BLD CALC-SCNC: 2.5 MMOL/L (ref ?–2)
BASE EXCESS BLD CALC-SCNC: 7.2 MMOL/L (ref ?–2)
BASOPHILS # BLD AUTO: 0 X10(3) UL (ref 0–0.2)
BASOPHILS # BLD AUTO: 0.01 X10(3) UL (ref 0–0.2)
BASOPHILS # BLD AUTO: 0.02 X10(3) UL (ref 0–0.2)
BASOPHILS # BLD AUTO: 0.03 X10(3) UL (ref 0–0.2)
BASOPHILS # BLD AUTO: 0.04 X10(3) UL (ref 0–0.2)
BASOPHILS # BLD AUTO: 0.05 X10(3) UL (ref 0–0.2)
BASOPHILS # BLD AUTO: 0.05 X10(3) UL (ref 0–0.2)
BASOPHILS # BLD: 0 X10(3) UL (ref 0–0.2)
BASOPHILS NFR BLD AUTO: 0 %
BASOPHILS NFR BLD AUTO: 0.1 %
BASOPHILS NFR BLD AUTO: 0.2 %
BASOPHILS NFR BLD AUTO: 0.3 %
BASOPHILS NFR BLD AUTO: 0.3 %
BASOPHILS NFR BLD AUTO: 0.4 %
BASOPHILS NFR BLD AUTO: 0.5 %
BASOPHILS NFR BLD AUTO: 0.6 %
BASOPHILS NFR BLD: 0 %
BASOPHILS NFR PLR: 0 %
BILIRUB SERPL-MCNC: 0.5 MG/DL (ref 0.1–2)
BILIRUB SERPL-MCNC: 0.6 MG/DL (ref 0.1–2)
BILIRUB UR QL: NEGATIVE
BILIRUB UR QL: NEGATIVE
BLOOD GAS EPAP: 5 CM H2O
BLOOD GAS IPAP: 12 CM H2O
BLOOD TYPE BARCODE: 5100
BLOOD TYPE BARCODE: 5100
BUN BLD-MCNC: 11 MG/DL (ref 7–18)
BUN BLD-MCNC: 12 MG/DL (ref 7–18)
BUN BLD-MCNC: 19 MG/DL (ref 7–18)
BUN BLD-MCNC: 20 MG/DL (ref 7–18)
BUN BLD-MCNC: 22 MG/DL (ref 7–18)
BUN BLD-MCNC: 24 MG/DL (ref 7–18)
BUN BLD-MCNC: 26 MG/DL (ref 7–18)
BUN BLD-MCNC: 27 MG/DL (ref 7–18)
BUN BLD-MCNC: 28 MG/DL (ref 7–18)
BUN BLD-MCNC: 29 MG/DL (ref 7–18)
BUN BLD-MCNC: 29 MG/DL (ref 7–18)
BUN BLD-MCNC: 31 MG/DL (ref 7–18)
BUN BLD-MCNC: 32 MG/DL (ref 7–18)
BUN BLD-MCNC: 33 MG/DL (ref 7–18)
BUN BLD-MCNC: 34 MG/DL (ref 7–18)
BUN BLD-MCNC: 35 MG/DL (ref 7–18)
BUN BLD-MCNC: 36 MG/DL (ref 7–18)
BUN BLD-MCNC: 38 MG/DL (ref 7–18)
BUN BLD-MCNC: 39 MG/DL (ref 7–18)
BUN BLD-MCNC: 40 MG/DL (ref 7–18)
BUN BLD-MCNC: 41 MG/DL (ref 7–18)
BUN BLD-MCNC: 41 MG/DL (ref 7–18)
BUN BLD-MCNC: 43 MG/DL (ref 7–18)
BUN BLD-MCNC: 43 MG/DL (ref 7–18)
BUN BLD-MCNC: 44 MG/DL (ref 7–18)
BUN BLD-MCNC: 45 MG/DL (ref 7–18)
BUN BLD-MCNC: 45 MG/DL (ref 7–18)
BUN BLD-MCNC: 54 MG/DL (ref 7–18)
BUN BLD-MCNC: 60 MG/DL (ref 7–18)
BUN BLD-MCNC: 69 MG/DL (ref 7–18)
BUN/CREAT SERPL: 14.1 (ref 10–20)
BUN/CREAT SERPL: 19 (ref 10–20)
BUN/CREAT SERPL: 19.8 (ref 10–20)
BUN/CREAT SERPL: 22 (ref 10–20)
BUN/CREAT SERPL: 36.8 (ref 10–20)
BUN/CREAT SERPL: 39.5 (ref 10–20)
BUN/CREAT SERPL: 41.6 (ref 10–20)
BUN/CREAT SERPL: 41.7 (ref 10–20)
BUN/CREAT SERPL: 42.2 (ref 10–20)
BUN/CREAT SERPL: 42.7 (ref 10–20)
BUN/CREAT SERPL: 42.9 (ref 10–20)
BUN/CREAT SERPL: 42.9 (ref 10–20)
BUN/CREAT SERPL: 44.2 (ref 10–20)
BUN/CREAT SERPL: 46.7 (ref 10–20)
BUN/CREAT SERPL: 48.9 (ref 10–20)
BUN/CREAT SERPL: 49.2 (ref 10–20)
BUN/CREAT SERPL: 49.3 (ref 10–20)
BUN/CREAT SERPL: 49.3 (ref 10–20)
BUN/CREAT SERPL: 49.4 (ref 10–20)
BUN/CREAT SERPL: 51.6 (ref 10–20)
BUN/CREAT SERPL: 51.8 (ref 10–20)
BUN/CREAT SERPL: 52.5 (ref 10–20)
BUN/CREAT SERPL: 54.7 (ref 10–20)
BUN/CREAT SERPL: 55.4 (ref 10–20)
BUN/CREAT SERPL: 56.1 (ref 10–20)
BUN/CREAT SERPL: 56.7 (ref 10–20)
BUN/CREAT SERPL: 62.3 (ref 10–20)
BUN/CREAT SERPL: 62.3 (ref 10–20)
BUN/CREAT SERPL: 62.5 (ref 10–20)
BUN/CREAT SERPL: 64.3 (ref 10–20)
BUN/CREAT SERPL: 65.7 (ref 10–20)
BUN/CREAT SERPL: 66.2 (ref 10–20)
BUN/CREAT SERPL: 75 (ref 10–20)
C DIFF TOX B STL QL: NEGATIVE
CA-I BLD-SCNC: 1.05 MMOL/L (ref 0.95–1.32)
CA-I BLD-SCNC: 1.07 MMOL/L (ref 0.95–1.32)
CA-I BLD-SCNC: 1.08 MMOL/L (ref 0.95–1.32)
CA-I BLD-SCNC: 1.11 MMOL/L (ref 0.95–1.32)
CA-I BLD-SCNC: 1.12 MMOL/L (ref 0.95–1.32)
CA-I BLD-SCNC: 1.14 MMOL/L (ref 0.95–1.32)
CALCIUM BLD-MCNC: 6.7 MG/DL (ref 8.5–10.1)
CALCIUM BLD-MCNC: 6.9 MG/DL (ref 8.5–10.1)
CALCIUM BLD-MCNC: 6.9 MG/DL (ref 8.5–10.1)
CALCIUM BLD-MCNC: 7 MG/DL (ref 8.5–10.1)
CALCIUM BLD-MCNC: 7.1 MG/DL (ref 8.5–10.1)
CALCIUM BLD-MCNC: 7.3 MG/DL (ref 8.5–10.1)
CALCIUM BLD-MCNC: 7.4 MG/DL (ref 8.5–10.1)
CALCIUM BLD-MCNC: 7.4 MG/DL (ref 8.5–10.1)
CALCIUM BLD-MCNC: 7.5 MG/DL (ref 8.5–10.1)
CALCIUM BLD-MCNC: 7.6 MG/DL (ref 8.5–10.1)
CALCIUM BLD-MCNC: 7.6 MG/DL (ref 8.5–10.1)
CALCIUM BLD-MCNC: 7.7 MG/DL (ref 8.5–10.1)
CALCIUM BLD-MCNC: 7.8 MG/DL (ref 8.5–10.1)
CALCIUM BLD-MCNC: 7.9 MG/DL (ref 8.5–10.1)
CALCIUM BLD-MCNC: 8 MG/DL (ref 8.5–10.1)
CALCIUM BLD-MCNC: 8.1 MG/DL (ref 8.5–10.1)
CALCIUM BLD-MCNC: 8.2 MG/DL (ref 8.5–10.1)
CALCIUM BLD-MCNC: 8.3 MG/DL (ref 8.5–10.1)
CALCIUM BLD-MCNC: 8.3 MG/DL (ref 8.5–10.1)
CALCIUM BLD-MCNC: 8.4 MG/DL (ref 8.5–10.1)
CALCIUM BLD-MCNC: 8.7 MG/DL (ref 8.5–10.1)
CHLORIDE SERPL-SCNC: 101 MMOL/L (ref 98–112)
CHLORIDE SERPL-SCNC: 101 MMOL/L (ref 98–112)
CHLORIDE SERPL-SCNC: 102 MMOL/L (ref 98–112)
CHLORIDE SERPL-SCNC: 103 MMOL/L (ref 98–112)
CHLORIDE SERPL-SCNC: 105 MMOL/L (ref 98–112)
CHLORIDE SERPL-SCNC: 106 MMOL/L (ref 98–112)
CHLORIDE SERPL-SCNC: 107 MMOL/L (ref 98–112)
CHLORIDE SERPL-SCNC: 107 MMOL/L (ref 98–112)
CHLORIDE SERPL-SCNC: 108 MMOL/L (ref 98–112)
CHLORIDE SERPL-SCNC: 108 MMOL/L (ref 98–112)
CHLORIDE SERPL-SCNC: 109 MMOL/L (ref 98–112)
CHLORIDE SERPL-SCNC: 111 MMOL/L (ref 98–112)
CHLORIDE SERPL-SCNC: 112 MMOL/L (ref 98–112)
CHLORIDE SERPL-SCNC: 113 MMOL/L (ref 98–112)
CHLORIDE SERPL-SCNC: 114 MMOL/L (ref 98–112)
CHLORIDE SERPL-SCNC: 115 MMOL/L (ref 98–112)
CHLORIDE SERPL-SCNC: 117 MMOL/L (ref 98–112)
CHLORIDE SERPL-SCNC: 117 MMOL/L (ref 98–112)
CHOLEST SMN-MCNC: 114 MG/DL (ref ?–200)
CK SERPL-CCNC: 122 U/L
CK SERPL-CCNC: 37 U/L
CLARITY UR: CLEAR
CO2 SERPL-SCNC: 21 MMOL/L (ref 21–32)
CO2 SERPL-SCNC: 22 MMOL/L (ref 21–32)
CO2 SERPL-SCNC: 23 MMOL/L (ref 21–32)
CO2 SERPL-SCNC: 25 MMOL/L (ref 21–32)
CO2 SERPL-SCNC: 26 MMOL/L (ref 21–32)
CO2 SERPL-SCNC: 27 MMOL/L (ref 21–32)
CO2 SERPL-SCNC: 27 MMOL/L (ref 21–32)
CO2 SERPL-SCNC: 29 MMOL/L (ref 21–32)
CO2 SERPL-SCNC: 30 MMOL/L (ref 21–32)
CO2 SERPL-SCNC: 31 MMOL/L (ref 21–32)
CO2 SERPL-SCNC: 32 MMOL/L (ref 21–32)
CO2 SERPL-SCNC: 32 MMOL/L (ref 21–32)
CO2 SERPL-SCNC: 33 MMOL/L (ref 21–32)
CO2 SERPL-SCNC: 34 MMOL/L (ref 21–32)
CO2 SERPL-SCNC: 36 MMOL/L (ref 21–32)
CO2 SERPL-SCNC: 37 MMOL/L (ref 21–32)
CO2 SERPL-SCNC: 37 MMOL/L (ref 21–32)
COHGB MFR BLD: 1.9 % (ref 0–1.5)
COHGB MFR BLD: 2.2 % (ref 0–1.5)
COLOR FLD: YELLOW
COLOR UR: YELLOW
COLOR UR: YELLOW
CREAT BLD-MCNC: 0.43 MG/DL
CREAT BLD-MCNC: 0.45 MG/DL
CREAT BLD-MCNC: 0.48 MG/DL
CREAT BLD-MCNC: 0.53 MG/DL
CREAT BLD-MCNC: 0.56 MG/DL
CREAT BLD-MCNC: 0.58 MG/DL
CREAT BLD-MCNC: 0.59 MG/DL
CREAT BLD-MCNC: 0.6 MG/DL
CREAT BLD-MCNC: 0.61 MG/DL
CREAT BLD-MCNC: 0.62 MG/DL
CREAT BLD-MCNC: 0.64 MG/DL
CREAT BLD-MCNC: 0.64 MG/DL
CREAT BLD-MCNC: 0.65 MG/DL
CREAT BLD-MCNC: 0.65 MG/DL
CREAT BLD-MCNC: 0.67 MG/DL
CREAT BLD-MCNC: 0.67 MG/DL
CREAT BLD-MCNC: 0.69 MG/DL
CREAT BLD-MCNC: 0.71 MG/DL
CREAT BLD-MCNC: 0.74 MG/DL
CREAT BLD-MCNC: 0.77 MG/DL
CREAT BLD-MCNC: 0.79 MG/DL
CREAT BLD-MCNC: 0.81 MG/DL
CREAT BLD-MCNC: 0.85 MG/DL
CREAT BLD-MCNC: 0.96 MG/DL
CREAT BLD-MCNC: 1.05 MG/DL
CREAT BLD-MCNC: 1.18 MG/DL
CREAT BLD-MCNC: 1.23 MG/DL
CREAT BLD-MCNC: 1.63 MG/DL
CREAT BLD-MCNC: 2.73 MG/DL
DEPRECATED HBV CORE AB SER IA-ACNC: 234.4 NG/ML
DEPRECATED HBV CORE AB SER IA-ACNC: 393.2 NG/ML
DEPRECATED RDW RBC AUTO: 47.8 FL (ref 35.1–46.3)
DEPRECATED RDW RBC AUTO: 47.8 FL (ref 35.1–46.3)
DEPRECATED RDW RBC AUTO: 50.1 FL (ref 35.1–46.3)
DEPRECATED RDW RBC AUTO: 50.7 FL (ref 35.1–46.3)
DEPRECATED RDW RBC AUTO: 53.3 FL (ref 35.1–46.3)
DEPRECATED RDW RBC AUTO: 53.5 FL (ref 35.1–46.3)
DEPRECATED RDW RBC AUTO: 53.7 FL (ref 35.1–46.3)
DEPRECATED RDW RBC AUTO: 53.8 FL (ref 35.1–46.3)
DEPRECATED RDW RBC AUTO: 54.4 FL (ref 35.1–46.3)
DEPRECATED RDW RBC AUTO: 54.5 FL (ref 35.1–46.3)
DEPRECATED RDW RBC AUTO: 54.9 FL (ref 35.1–46.3)
DEPRECATED RDW RBC AUTO: 55.6 FL (ref 35.1–46.3)
DEPRECATED RDW RBC AUTO: 55.8 FL (ref 35.1–46.3)
DEPRECATED RDW RBC AUTO: 56.3 FL (ref 35.1–46.3)
DEPRECATED RDW RBC AUTO: 56.4 FL (ref 35.1–46.3)
DEPRECATED RDW RBC AUTO: 56.7 FL (ref 35.1–46.3)
DEPRECATED RDW RBC AUTO: 56.7 FL (ref 35.1–46.3)
DEPRECATED RDW RBC AUTO: 56.8 FL (ref 35.1–46.3)
DEPRECATED RDW RBC AUTO: 56.9 FL (ref 35.1–46.3)
DEPRECATED RDW RBC AUTO: 59.5 FL (ref 35.1–46.3)
DEPRECATED RDW RBC AUTO: 60.1 FL (ref 35.1–46.3)
DEPRECATED RDW RBC AUTO: 60.7 FL (ref 35.1–46.3)
DEPRECATED RDW RBC AUTO: 61.3 FL (ref 35.1–46.3)
DEPRECATED RDW RBC AUTO: 62 FL (ref 35.1–46.3)
DEPRECATED RDW RBC AUTO: 62.4 FL (ref 35.1–46.3)
DEPRECATED RDW RBC AUTO: 63 FL (ref 35.1–46.3)
DEPRECATED RDW RBC AUTO: 63.5 FL (ref 35.1–46.3)
DEPRECATED RDW RBC AUTO: 65.6 FL (ref 35.1–46.3)
DEPRECATED RDW RBC AUTO: 66 FL (ref 35.1–46.3)
DEPRECATED RDW RBC AUTO: 66.7 FL (ref 35.1–46.3)
DEPRECATED RDW RBC AUTO: 67.3 FL (ref 35.1–46.3)
DEPRECATED RDW RBC AUTO: 67.7 FL (ref 35.1–46.3)
DEPRECATED RDW RBC AUTO: 69.7 FL (ref 35.1–46.3)
EOSINOPHIL # BLD AUTO: 0 X10(3) UL (ref 0–0.7)
EOSINOPHIL # BLD AUTO: 0.01 X10(3) UL (ref 0–0.7)
EOSINOPHIL # BLD AUTO: 0.02 X10(3) UL (ref 0–0.7)
EOSINOPHIL # BLD AUTO: 0.06 X10(3) UL (ref 0–0.7)
EOSINOPHIL # BLD AUTO: 0.1 X10(3) UL (ref 0–0.7)
EOSINOPHIL # BLD AUTO: 0.11 X10(3) UL (ref 0–0.7)
EOSINOPHIL # BLD AUTO: 0.12 X10(3) UL (ref 0–0.7)
EOSINOPHIL # BLD AUTO: 0.13 X10(3) UL (ref 0–0.7)
EOSINOPHIL # BLD AUTO: 0.13 X10(3) UL (ref 0–0.7)
EOSINOPHIL # BLD AUTO: 0.14 X10(3) UL (ref 0–0.7)
EOSINOPHIL # BLD AUTO: 0.14 X10(3) UL (ref 0–0.7)
EOSINOPHIL # BLD AUTO: 0.17 X10(3) UL (ref 0–0.7)
EOSINOPHIL # BLD AUTO: 0.17 X10(3) UL (ref 0–0.7)
EOSINOPHIL # BLD AUTO: 0.18 X10(3) UL (ref 0–0.7)
EOSINOPHIL # BLD AUTO: 0.2 X10(3) UL (ref 0–0.7)
EOSINOPHIL # BLD AUTO: 0.2 X10(3) UL (ref 0–0.7)
EOSINOPHIL # BLD AUTO: 0.21 X10(3) UL (ref 0–0.7)
EOSINOPHIL # BLD AUTO: 0.21 X10(3) UL (ref 0–0.7)
EOSINOPHIL # BLD AUTO: 0.24 X10(3) UL (ref 0–0.7)
EOSINOPHIL # BLD: 0 X10(3) UL (ref 0–0.7)
EOSINOPHIL NFR BLD AUTO: 0 %
EOSINOPHIL NFR BLD AUTO: 0.1 %
EOSINOPHIL NFR BLD AUTO: 0.1 %
EOSINOPHIL NFR BLD AUTO: 0.2 %
EOSINOPHIL NFR BLD AUTO: 0.3 %
EOSINOPHIL NFR BLD AUTO: 0.5 %
EOSINOPHIL NFR BLD AUTO: 0.9 %
EOSINOPHIL NFR BLD AUTO: 1 %
EOSINOPHIL NFR BLD AUTO: 1.3 %
EOSINOPHIL NFR BLD AUTO: 1.4 %
EOSINOPHIL NFR BLD AUTO: 1.5 %
EOSINOPHIL NFR BLD AUTO: 1.5 %
EOSINOPHIL NFR BLD AUTO: 1.6 %
EOSINOPHIL NFR BLD AUTO: 1.8 %
EOSINOPHIL NFR BLD AUTO: 2.4 %
EOSINOPHIL NFR BLD AUTO: 3.3 %
EOSINOPHIL NFR BLD AUTO: 3.7 %
EOSINOPHIL NFR BLD AUTO: 4 %
EOSINOPHIL NFR BLD AUTO: 4.4 %
EOSINOPHIL NFR BLD: 0 %
EOSINOPHIL NFR PLR: 0 %
ERYTHROCYTE [DISTWIDTH] IN BLOOD BY AUTOMATED COUNT: 14.7 % (ref 11–15)
ERYTHROCYTE [DISTWIDTH] IN BLOOD BY AUTOMATED COUNT: 14.8 % (ref 11–15)
ERYTHROCYTE [DISTWIDTH] IN BLOOD BY AUTOMATED COUNT: 15.4 % (ref 11–15)
ERYTHROCYTE [DISTWIDTH] IN BLOOD BY AUTOMATED COUNT: 15.6 % (ref 11–15)
ERYTHROCYTE [DISTWIDTH] IN BLOOD BY AUTOMATED COUNT: 15.7 % (ref 11–15)
ERYTHROCYTE [DISTWIDTH] IN BLOOD BY AUTOMATED COUNT: 15.7 % (ref 11–15)
ERYTHROCYTE [DISTWIDTH] IN BLOOD BY AUTOMATED COUNT: 16.2 % (ref 11–15)
ERYTHROCYTE [DISTWIDTH] IN BLOOD BY AUTOMATED COUNT: 16.3 % (ref 11–15)
ERYTHROCYTE [DISTWIDTH] IN BLOOD BY AUTOMATED COUNT: 16.3 % (ref 11–15)
ERYTHROCYTE [DISTWIDTH] IN BLOOD BY AUTOMATED COUNT: 16.5 % (ref 11–15)
ERYTHROCYTE [DISTWIDTH] IN BLOOD BY AUTOMATED COUNT: 16.6 % (ref 11–15)
ERYTHROCYTE [DISTWIDTH] IN BLOOD BY AUTOMATED COUNT: 16.7 % (ref 11–15)
ERYTHROCYTE [DISTWIDTH] IN BLOOD BY AUTOMATED COUNT: 16.9 % (ref 11–15)
ERYTHROCYTE [DISTWIDTH] IN BLOOD BY AUTOMATED COUNT: 17.1 % (ref 11–15)
ERYTHROCYTE [DISTWIDTH] IN BLOOD BY AUTOMATED COUNT: 17.2 % (ref 11–15)
ERYTHROCYTE [DISTWIDTH] IN BLOOD BY AUTOMATED COUNT: 17.6 % (ref 11–15)
ERYTHROCYTE [DISTWIDTH] IN BLOOD BY AUTOMATED COUNT: 17.6 % (ref 11–15)
ERYTHROCYTE [DISTWIDTH] IN BLOOD BY AUTOMATED COUNT: 17.8 % (ref 11–15)
ERYTHROCYTE [DISTWIDTH] IN BLOOD BY AUTOMATED COUNT: 17.9 % (ref 11–15)
ERYTHROCYTE [DISTWIDTH] IN BLOOD BY AUTOMATED COUNT: 18.5 % (ref 11–15)
ERYTHROCYTE [DISTWIDTH] IN BLOOD BY AUTOMATED COUNT: 18.5 % (ref 11–15)
ERYTHROCYTE [DISTWIDTH] IN BLOOD BY AUTOMATED COUNT: 19 % (ref 11–15)
ERYTHROCYTE [DISTWIDTH] IN BLOOD BY AUTOMATED COUNT: 19.4 % (ref 11–15)
ERYTHROCYTE [DISTWIDTH] IN BLOOD BY AUTOMATED COUNT: 19.5 % (ref 11–15)
ERYTHROCYTE [DISTWIDTH] IN BLOOD BY AUTOMATED COUNT: 20.1 % (ref 11–15)
ERYTHROCYTE [DISTWIDTH] IN BLOOD BY AUTOMATED COUNT: 20.6 % (ref 11–15)
ERYTHROCYTE [SEDIMENTATION RATE] IN BLOOD: 21 MM/HR
EST. AVERAGE GLUCOSE BLD GHB EST-MCNC: 97 MG/DL (ref 68–126)
FIBRINOGEN PPP-MCNC: 145 MG/DL (ref 176–491)
FIBRINOGEN PPP-MCNC: 158 MG/DL (ref 176–491)
FIBRINOGEN PPP-MCNC: 164 MG/DL (ref 176–491)
FIBRINOGEN PPP-MCNC: 219 MG/DL (ref 176–491)
FIBRINOGEN PPP-MCNC: 222 MG/DL (ref 176–491)
FIBRINOGEN PPP-MCNC: 230 MG/DL (ref 176–491)
FIBRINOGEN PPP-MCNC: 271 MG/DL (ref 176–491)
FIBRINOGEN PPP-MCNC: 298 MG/DL (ref 176–491)
GANGLIONIC ACETYLCHOLINE RECEPTOR AB: 0 PMOL/L
GLOBULIN PLAS-MCNC: 1.4 G/DL (ref 2.8–4.4)
GLOBULIN PLAS-MCNC: 1.8 G/DL (ref 2.8–4.4)
GLUCOSE BLD-MCNC: 101 MG/DL (ref 70–99)
GLUCOSE BLD-MCNC: 105 MG/DL (ref 70–99)
GLUCOSE BLD-MCNC: 106 MG/DL (ref 70–99)
GLUCOSE BLD-MCNC: 111 MG/DL (ref 70–99)
GLUCOSE BLD-MCNC: 112 MG/DL (ref 70–99)
GLUCOSE BLD-MCNC: 114 MG/DL (ref 70–99)
GLUCOSE BLD-MCNC: 115 MG/DL (ref 70–99)
GLUCOSE BLD-MCNC: 116 MG/DL (ref 70–99)
GLUCOSE BLD-MCNC: 121 MG/DL (ref 70–99)
GLUCOSE BLD-MCNC: 122 MG/DL (ref 70–99)
GLUCOSE BLD-MCNC: 128 MG/DL (ref 70–99)
GLUCOSE BLD-MCNC: 131 MG/DL (ref 70–99)
GLUCOSE BLD-MCNC: 134 MG/DL (ref 70–99)
GLUCOSE BLD-MCNC: 137 MG/DL (ref 70–99)
GLUCOSE BLD-MCNC: 139 MG/DL (ref 70–99)
GLUCOSE BLD-MCNC: 139 MG/DL (ref 70–99)
GLUCOSE BLD-MCNC: 141 MG/DL (ref 70–99)
GLUCOSE BLD-MCNC: 143 MG/DL (ref 70–99)
GLUCOSE BLD-MCNC: 152 MG/DL (ref 70–99)
GLUCOSE BLD-MCNC: 159 MG/DL (ref 70–99)
GLUCOSE BLD-MCNC: 159 MG/DL (ref 70–99)
GLUCOSE BLD-MCNC: 160 MG/DL (ref 70–99)
GLUCOSE BLD-MCNC: 169 MG/DL (ref 70–99)
GLUCOSE BLD-MCNC: 180 MG/DL (ref 70–99)
GLUCOSE BLD-MCNC: 182 MG/DL (ref 70–99)
GLUCOSE BLD-MCNC: 183 MG/DL (ref 70–99)
GLUCOSE BLD-MCNC: 185 MG/DL (ref 70–99)
GLUCOSE BLD-MCNC: 189 MG/DL (ref 70–99)
GLUCOSE BLD-MCNC: 190 MG/DL (ref 70–99)
GLUCOSE BLD-MCNC: 198 MG/DL (ref 70–99)
GLUCOSE BLD-MCNC: 225 MG/DL (ref 70–99)
GLUCOSE BLD-MCNC: 91 MG/DL (ref 70–99)
GLUCOSE BLD-MCNC: 99 MG/DL (ref 70–99)
GLUCOSE BLDC GLUCOMTR-MCNC: 100 MG/DL (ref 70–99)
GLUCOSE BLDC GLUCOMTR-MCNC: 105 MG/DL (ref 70–99)
GLUCOSE BLDC GLUCOMTR-MCNC: 105 MG/DL (ref 70–99)
GLUCOSE BLDC GLUCOMTR-MCNC: 106 MG/DL (ref 70–99)
GLUCOSE BLDC GLUCOMTR-MCNC: 109 MG/DL (ref 70–99)
GLUCOSE BLDC GLUCOMTR-MCNC: 112 MG/DL (ref 70–99)
GLUCOSE BLDC GLUCOMTR-MCNC: 112 MG/DL (ref 70–99)
GLUCOSE BLDC GLUCOMTR-MCNC: 113 MG/DL (ref 70–99)
GLUCOSE BLDC GLUCOMTR-MCNC: 114 MG/DL (ref 70–99)
GLUCOSE BLDC GLUCOMTR-MCNC: 114 MG/DL (ref 70–99)
GLUCOSE BLDC GLUCOMTR-MCNC: 116 MG/DL (ref 70–99)
GLUCOSE BLDC GLUCOMTR-MCNC: 117 MG/DL (ref 70–99)
GLUCOSE BLDC GLUCOMTR-MCNC: 118 MG/DL (ref 70–99)
GLUCOSE BLDC GLUCOMTR-MCNC: 118 MG/DL (ref 70–99)
GLUCOSE BLDC GLUCOMTR-MCNC: 119 MG/DL (ref 70–99)
GLUCOSE BLDC GLUCOMTR-MCNC: 120 MG/DL (ref 70–99)
GLUCOSE BLDC GLUCOMTR-MCNC: 121 MG/DL (ref 70–99)
GLUCOSE BLDC GLUCOMTR-MCNC: 123 MG/DL (ref 70–99)
GLUCOSE BLDC GLUCOMTR-MCNC: 123 MG/DL (ref 70–99)
GLUCOSE BLDC GLUCOMTR-MCNC: 124 MG/DL (ref 70–99)
GLUCOSE BLDC GLUCOMTR-MCNC: 125 MG/DL (ref 70–99)
GLUCOSE BLDC GLUCOMTR-MCNC: 126 MG/DL (ref 70–99)
GLUCOSE BLDC GLUCOMTR-MCNC: 127 MG/DL (ref 70–99)
GLUCOSE BLDC GLUCOMTR-MCNC: 129 MG/DL (ref 70–99)
GLUCOSE BLDC GLUCOMTR-MCNC: 130 MG/DL (ref 70–99)
GLUCOSE BLDC GLUCOMTR-MCNC: 132 MG/DL (ref 70–99)
GLUCOSE BLDC GLUCOMTR-MCNC: 133 MG/DL (ref 70–99)
GLUCOSE BLDC GLUCOMTR-MCNC: 133 MG/DL (ref 70–99)
GLUCOSE BLDC GLUCOMTR-MCNC: 134 MG/DL (ref 70–99)
GLUCOSE BLDC GLUCOMTR-MCNC: 134 MG/DL (ref 70–99)
GLUCOSE BLDC GLUCOMTR-MCNC: 135 MG/DL (ref 70–99)
GLUCOSE BLDC GLUCOMTR-MCNC: 137 MG/DL (ref 70–99)
GLUCOSE BLDC GLUCOMTR-MCNC: 138 MG/DL (ref 70–99)
GLUCOSE BLDC GLUCOMTR-MCNC: 141 MG/DL (ref 70–99)
GLUCOSE BLDC GLUCOMTR-MCNC: 143 MG/DL (ref 70–99)
GLUCOSE BLDC GLUCOMTR-MCNC: 145 MG/DL (ref 70–99)
GLUCOSE BLDC GLUCOMTR-MCNC: 146 MG/DL (ref 70–99)
GLUCOSE BLDC GLUCOMTR-MCNC: 147 MG/DL (ref 70–99)
GLUCOSE BLDC GLUCOMTR-MCNC: 147 MG/DL (ref 70–99)
GLUCOSE BLDC GLUCOMTR-MCNC: 148 MG/DL (ref 70–99)
GLUCOSE BLDC GLUCOMTR-MCNC: 149 MG/DL (ref 70–99)
GLUCOSE BLDC GLUCOMTR-MCNC: 150 MG/DL (ref 70–99)
GLUCOSE BLDC GLUCOMTR-MCNC: 151 MG/DL (ref 70–99)
GLUCOSE BLDC GLUCOMTR-MCNC: 151 MG/DL (ref 70–99)
GLUCOSE BLDC GLUCOMTR-MCNC: 152 MG/DL (ref 70–99)
GLUCOSE BLDC GLUCOMTR-MCNC: 153 MG/DL (ref 70–99)
GLUCOSE BLDC GLUCOMTR-MCNC: 154 MG/DL (ref 70–99)
GLUCOSE BLDC GLUCOMTR-MCNC: 155 MG/DL (ref 70–99)
GLUCOSE BLDC GLUCOMTR-MCNC: 157 MG/DL (ref 70–99)
GLUCOSE BLDC GLUCOMTR-MCNC: 158 MG/DL (ref 70–99)
GLUCOSE BLDC GLUCOMTR-MCNC: 161 MG/DL (ref 70–99)
GLUCOSE BLDC GLUCOMTR-MCNC: 162 MG/DL (ref 70–99)
GLUCOSE BLDC GLUCOMTR-MCNC: 162 MG/DL (ref 70–99)
GLUCOSE BLDC GLUCOMTR-MCNC: 163 MG/DL (ref 70–99)
GLUCOSE BLDC GLUCOMTR-MCNC: 163 MG/DL (ref 70–99)
GLUCOSE BLDC GLUCOMTR-MCNC: 165 MG/DL (ref 70–99)
GLUCOSE BLDC GLUCOMTR-MCNC: 167 MG/DL (ref 70–99)
GLUCOSE BLDC GLUCOMTR-MCNC: 168 MG/DL (ref 70–99)
GLUCOSE BLDC GLUCOMTR-MCNC: 168 MG/DL (ref 70–99)
GLUCOSE BLDC GLUCOMTR-MCNC: 170 MG/DL (ref 70–99)
GLUCOSE BLDC GLUCOMTR-MCNC: 171 MG/DL (ref 70–99)
GLUCOSE BLDC GLUCOMTR-MCNC: 172 MG/DL (ref 70–99)
GLUCOSE BLDC GLUCOMTR-MCNC: 176 MG/DL (ref 70–99)
GLUCOSE BLDC GLUCOMTR-MCNC: 177 MG/DL (ref 70–99)
GLUCOSE BLDC GLUCOMTR-MCNC: 180 MG/DL (ref 70–99)
GLUCOSE BLDC GLUCOMTR-MCNC: 182 MG/DL (ref 70–99)
GLUCOSE BLDC GLUCOMTR-MCNC: 183 MG/DL (ref 70–99)
GLUCOSE BLDC GLUCOMTR-MCNC: 185 MG/DL (ref 70–99)
GLUCOSE BLDC GLUCOMTR-MCNC: 191 MG/DL (ref 70–99)
GLUCOSE BLDC GLUCOMTR-MCNC: 221 MG/DL (ref 70–99)
GLUCOSE PLR-MCNC: 195 MG/DL
GLUCOSE UR-MCNC: NEGATIVE MG/DL
GLUCOSE UR-MCNC: NEGATIVE MG/DL
GLUTAMIC ACID DECARBOXYLASE AB: 14.2 IU/ML
HAV IGM SER QL: 2.5 MG/DL (ref 1.6–2.6)
HAV IGM SER QL: 2.7 MG/DL (ref 1.6–2.6)
HAV IGM SER QL: 3.1 MG/DL (ref 1.6–2.6)
HBA1C MFR BLD HPLC: 5 % (ref ?–5.7)
HCO3 BLDA-SCNC: 19 MEQ/L (ref 21–27)
HCO3 BLDA-SCNC: 21.1 MEQ/L (ref 21–27)
HCO3 BLDA-SCNC: 21.5 MEQ/L (ref 21–27)
HCO3 BLDA-SCNC: 22.1 MEQ/L (ref 21–27)
HCO3 BLDA-SCNC: 23.6 MEQ/L (ref 21–27)
HCO3 BLDA-SCNC: 26.9 MEQ/L (ref 21–27)
HCO3 BLDA-SCNC: 30.6 MEQ/L (ref 21–27)
HCT VFR BLD AUTO: 21.4 %
HCT VFR BLD AUTO: 23 %
HCT VFR BLD AUTO: 23.4 %
HCT VFR BLD AUTO: 23.4 %
HCT VFR BLD AUTO: 23.5 %
HCT VFR BLD AUTO: 23.7 %
HCT VFR BLD AUTO: 23.9 %
HCT VFR BLD AUTO: 23.9 %
HCT VFR BLD AUTO: 24.1 %
HCT VFR BLD AUTO: 24.9 %
HCT VFR BLD AUTO: 25.2 %
HCT VFR BLD AUTO: 25.3 %
HCT VFR BLD AUTO: 25.4 %
HCT VFR BLD AUTO: 25.4 %
HCT VFR BLD AUTO: 25.7 %
HCT VFR BLD AUTO: 25.7 %
HCT VFR BLD AUTO: 25.8 %
HCT VFR BLD AUTO: 25.9 %
HCT VFR BLD AUTO: 25.9 %
HCT VFR BLD AUTO: 26.3 %
HCT VFR BLD AUTO: 26.4 %
HCT VFR BLD AUTO: 28.3 %
HCT VFR BLD AUTO: 30.1 %
HCT VFR BLD AUTO: 30.1 %
HCT VFR BLD AUTO: 30.9 %
HCT VFR BLD AUTO: 31 %
HCT VFR BLD AUTO: 31.1 %
HCT VFR BLD AUTO: 32.1 %
HCT VFR BLD AUTO: 32.4 %
HCT VFR BLD AUTO: 38.9 %
HCT VFR BLD AUTO: 39.3 %
HCT VFR BLD AUTO: 39.8 %
HCT VFR BLD AUTO: 40.8 %
HCT VFR BLD AUTO: 42 %
HDLC SERPL-MCNC: 51 MG/DL (ref 40–59)
HEMOCCULT STL QL: POSITIVE
HGB BLD-MCNC: 10.3 G/DL
HGB BLD-MCNC: 10.4 G/DL
HGB BLD-MCNC: 10.4 G/DL
HGB BLD-MCNC: 12.6 G/DL
HGB BLD-MCNC: 12.7 G/DL
HGB BLD-MCNC: 13 G/DL
HGB BLD-MCNC: 13.1 G/DL
HGB BLD-MCNC: 13.5 G/DL (ref 13.5–17.5)
HGB BLD-MCNC: 13.7 G/DL
HGB BLD-MCNC: 6.5 G/DL
HGB BLD-MCNC: 7.2 G/DL
HGB BLD-MCNC: 7.2 G/DL
HGB BLD-MCNC: 7.3 G/DL
HGB BLD-MCNC: 7.3 G/DL
HGB BLD-MCNC: 7.4 G/DL
HGB BLD-MCNC: 7.4 G/DL
HGB BLD-MCNC: 7.7 G/DL
HGB BLD-MCNC: 7.7 G/DL
HGB BLD-MCNC: 7.8 G/DL
HGB BLD-MCNC: 7.9 G/DL
HGB BLD-MCNC: 8 G/DL
HGB BLD-MCNC: 8 G/DL
HGB BLD-MCNC: 8.1 G/DL
HGB BLD-MCNC: 8.2 G/DL
HGB BLD-MCNC: 8.3 G/DL
HGB BLD-MCNC: 8.3 G/DL (ref 13.5–17.5)
HGB BLD-MCNC: 8.6 G/DL
HGB BLD-MCNC: 9.5 G/DL
HGB BLD-MCNC: 9.5 G/DL
HGB BLD-MCNC: 9.6 G/DL
HGB BLD-MCNC: 9.6 G/DL
HGB UR QL STRIP.AUTO: NEGATIVE
HYALINE CASTS #/AREA URNS AUTO: PRESENT /LPF
IMM GRANULOCYTES # BLD AUTO: 0.01 X10(3) UL (ref 0–1)
IMM GRANULOCYTES # BLD AUTO: 0.02 X10(3) UL (ref 0–1)
IMM GRANULOCYTES # BLD AUTO: 0.03 X10(3) UL (ref 0–1)
IMM GRANULOCYTES # BLD AUTO: 0.04 X10(3) UL (ref 0–1)
IMM GRANULOCYTES # BLD AUTO: 0.05 X10(3) UL (ref 0–1)
IMM GRANULOCYTES # BLD AUTO: 0.06 X10(3) UL (ref 0–1)
IMM GRANULOCYTES # BLD AUTO: 0.07 X10(3) UL (ref 0–1)
IMM GRANULOCYTES # BLD AUTO: 0.09 X10(3) UL (ref 0–1)
IMM GRANULOCYTES # BLD AUTO: 0.1 X10(3) UL (ref 0–1)
IMM GRANULOCYTES # BLD AUTO: 0.11 X10(3) UL (ref 0–1)
IMM GRANULOCYTES # BLD AUTO: 0.16 X10(3) UL (ref 0–1)
IMM GRANULOCYTES # BLD AUTO: 0.16 X10(3) UL (ref 0–1)
IMM GRANULOCYTES # BLD AUTO: 0.2 X10(3) UL (ref 0–1)
IMM GRANULOCYTES NFR BLD: 0.2 %
IMM GRANULOCYTES NFR BLD: 0.3 %
IMM GRANULOCYTES NFR BLD: 0.4 %
IMM GRANULOCYTES NFR BLD: 0.5 %
IMM GRANULOCYTES NFR BLD: 0.6 %
IMM GRANULOCYTES NFR BLD: 0.7 %
IMM GRANULOCYTES NFR BLD: 0.8 %
IMM GRANULOCYTES NFR BLD: 0.9 %
IMM GRANULOCYTES NFR BLD: 1 %
IMM GRANULOCYTES NFR BLD: 1.1 %
IMM GRANULOCYTES NFR BLD: 1.1 %
IMM GRANULOCYTES NFR BLD: 1.2 %
IMM GRANULOCYTES NFR BLD: 1.3 %
IMM GRANULOCYTES NFR BLD: 1.6 %
IMM GRANULOCYTES NFR BLD: 1.6 %
IMM GRANULOCYTES NFR BLD: 1.8 %
IMM GRANULOCYTES NFR BLD: 2 %
IRON SATURATION: 17 %
IRON SATURATION: 20 %
IRON SERPL-MCNC: 17 UG/DL
IRON SERPL-MCNC: 37 UG/DL
LACTIC ACID (BLOOD GAS): 0.9 MMOL/L (ref 0.5–2.2)
LACTIC ACID (BLOOD GAS): 1.9 MMOL/L (ref 0.5–2.2)
LDH FLD L TO P-CCNC: 62 U/L
LDLC SERPL CALC-MCNC: 47 MG/DL (ref ?–100)
LEUKOCYTE ESTERASE UR QL STRIP.AUTO: NEGATIVE
LYMPHOCYTES # BLD AUTO: 0.24 X10(3) UL (ref 1–4)
LYMPHOCYTES # BLD AUTO: 0.34 X10(3) UL (ref 1–4)
LYMPHOCYTES # BLD AUTO: 0.39 X10(3) UL (ref 1–4)
LYMPHOCYTES # BLD AUTO: 0.4 X10(3) UL (ref 1–4)
LYMPHOCYTES # BLD AUTO: 0.4 X10(3) UL (ref 1–4)
LYMPHOCYTES # BLD AUTO: 0.47 X10(3) UL (ref 1–4)
LYMPHOCYTES # BLD AUTO: 0.49 X10(3) UL (ref 1–4)
LYMPHOCYTES # BLD AUTO: 0.5 X10(3) UL (ref 1–4)
LYMPHOCYTES # BLD AUTO: 0.53 X10(3) UL (ref 1–4)
LYMPHOCYTES # BLD AUTO: 0.53 X10(3) UL (ref 1–4)
LYMPHOCYTES # BLD AUTO: 0.54 X10(3) UL (ref 1–4)
LYMPHOCYTES # BLD AUTO: 0.54 X10(3) UL (ref 1–4)
LYMPHOCYTES # BLD AUTO: 0.57 X10(3) UL (ref 1–4)
LYMPHOCYTES # BLD AUTO: 0.58 X10(3) UL (ref 1–4)
LYMPHOCYTES # BLD AUTO: 0.58 X10(3) UL (ref 1–4)
LYMPHOCYTES # BLD AUTO: 0.59 X10(3) UL (ref 1–4)
LYMPHOCYTES # BLD AUTO: 0.62 X10(3) UL (ref 1–4)
LYMPHOCYTES # BLD AUTO: 0.63 X10(3) UL (ref 1–4)
LYMPHOCYTES # BLD AUTO: 0.68 X10(3) UL (ref 1–4)
LYMPHOCYTES # BLD AUTO: 0.68 X10(3) UL (ref 1–4)
LYMPHOCYTES # BLD AUTO: 0.71 X10(3) UL (ref 1–4)
LYMPHOCYTES # BLD AUTO: 0.71 X10(3) UL (ref 1–4)
LYMPHOCYTES # BLD AUTO: 0.72 X10(3) UL (ref 1–4)
LYMPHOCYTES # BLD AUTO: 0.74 X10(3) UL (ref 1–4)
LYMPHOCYTES # BLD AUTO: 0.74 X10(3) UL (ref 1–4)
LYMPHOCYTES # BLD AUTO: 0.76 X10(3) UL (ref 1–4)
LYMPHOCYTES # BLD AUTO: 0.78 X10(3) UL (ref 1–4)
LYMPHOCYTES # BLD AUTO: 0.81 X10(3) UL (ref 1–4)
LYMPHOCYTES # BLD AUTO: 0.84 X10(3) UL (ref 1–4)
LYMPHOCYTES # BLD AUTO: 0.98 X10(3) UL (ref 1–4)
LYMPHOCYTES NFR BLD AUTO: 11.2 %
LYMPHOCYTES NFR BLD AUTO: 11.5 %
LYMPHOCYTES NFR BLD AUTO: 11.6 %
LYMPHOCYTES NFR BLD AUTO: 11.9 %
LYMPHOCYTES NFR BLD AUTO: 13 %
LYMPHOCYTES NFR BLD AUTO: 14.3 %
LYMPHOCYTES NFR BLD AUTO: 15.1 %
LYMPHOCYTES NFR BLD AUTO: 2.7 %
LYMPHOCYTES NFR BLD AUTO: 2.8 %
LYMPHOCYTES NFR BLD AUTO: 2.8 %
LYMPHOCYTES NFR BLD AUTO: 3.2 %
LYMPHOCYTES NFR BLD AUTO: 4.2 %
LYMPHOCYTES NFR BLD AUTO: 4.7 %
LYMPHOCYTES NFR BLD AUTO: 5.1 %
LYMPHOCYTES NFR BLD AUTO: 6.1 %
LYMPHOCYTES NFR BLD AUTO: 6.4 %
LYMPHOCYTES NFR BLD AUTO: 6.5 %
LYMPHOCYTES NFR BLD AUTO: 6.5 %
LYMPHOCYTES NFR BLD AUTO: 6.6 %
LYMPHOCYTES NFR BLD AUTO: 6.8 %
LYMPHOCYTES NFR BLD AUTO: 6.9 %
LYMPHOCYTES NFR BLD AUTO: 7 %
LYMPHOCYTES NFR BLD AUTO: 7.1 %
LYMPHOCYTES NFR BLD AUTO: 7.8 %
LYMPHOCYTES NFR BLD AUTO: 8 %
LYMPHOCYTES NFR BLD AUTO: 8.5 %
LYMPHOCYTES NFR BLD AUTO: 8.5 %
LYMPHOCYTES NFR BLD AUTO: 8.8 %
LYMPHOCYTES NFR BLD AUTO: 9 %
LYMPHOCYTES NFR BLD AUTO: 9.1 %
LYMPHOCYTES NFR BLD AUTO: 9.4 %
LYMPHOCYTES NFR BLD AUTO: 9.9 %
LYMPHOCYTES NFR BLD: 1.01 X10(3) UL (ref 1–4)
LYMPHOCYTES NFR BLD: 10 %
LYMPHOCYTES NFR PLR: 29 %
M PROTEIN MFR SERPL ELPH: 4.7 G/DL (ref 6.4–8.2)
M PROTEIN MFR SERPL ELPH: 5 G/DL (ref 6.4–8.2)
MCH RBC QN AUTO: 28.6 PG (ref 26–34)
MCH RBC QN AUTO: 28.7 PG (ref 26–34)
MCH RBC QN AUTO: 28.8 PG (ref 26–34)
MCH RBC QN AUTO: 28.8 PG (ref 26–34)
MCH RBC QN AUTO: 28.9 PG (ref 26–34)
MCH RBC QN AUTO: 28.9 PG (ref 26–34)
MCH RBC QN AUTO: 29 PG (ref 26–34)
MCH RBC QN AUTO: 29.1 PG (ref 26–34)
MCH RBC QN AUTO: 29.1 PG (ref 26–34)
MCH RBC QN AUTO: 29.2 PG (ref 26–34)
MCH RBC QN AUTO: 29.3 PG (ref 26–34)
MCH RBC QN AUTO: 29.4 PG (ref 26–34)
MCH RBC QN AUTO: 29.5 PG (ref 26–34)
MCH RBC QN AUTO: 29.6 PG (ref 26–34)
MCH RBC QN AUTO: 29.7 PG (ref 26–34)
MCH RBC QN AUTO: 29.8 PG (ref 26–34)
MCH RBC QN AUTO: 29.9 PG (ref 26–34)
MCH RBC QN AUTO: 29.9 PG (ref 26–34)
MCH RBC QN AUTO: 30 PG (ref 26–34)
MCH RBC QN AUTO: 30 PG (ref 26–34)
MCH RBC QN AUTO: 30.2 PG (ref 26–34)
MCH RBC QN AUTO: 30.4 PG (ref 26–34)
MCH RBC QN AUTO: 30.4 PG (ref 26–34)
MCHC RBC AUTO-ENTMCNC: 30.1 G/DL (ref 31–37)
MCHC RBC AUTO-ENTMCNC: 30.4 G/DL (ref 31–37)
MCHC RBC AUTO-ENTMCNC: 30.4 G/DL (ref 31–37)
MCHC RBC AUTO-ENTMCNC: 30.5 G/DL (ref 31–37)
MCHC RBC AUTO-ENTMCNC: 30.5 G/DL (ref 31–37)
MCHC RBC AUTO-ENTMCNC: 30.7 G/DL (ref 31–37)
MCHC RBC AUTO-ENTMCNC: 30.8 G/DL (ref 31–37)
MCHC RBC AUTO-ENTMCNC: 31 G/DL (ref 31–37)
MCHC RBC AUTO-ENTMCNC: 31.1 G/DL (ref 31–37)
MCHC RBC AUTO-ENTMCNC: 31.3 G/DL (ref 31–37)
MCHC RBC AUTO-ENTMCNC: 31.4 G/DL (ref 31–37)
MCHC RBC AUTO-ENTMCNC: 31.5 G/DL (ref 31–37)
MCHC RBC AUTO-ENTMCNC: 31.5 G/DL (ref 31–37)
MCHC RBC AUTO-ENTMCNC: 31.6 G/DL (ref 31–37)
MCHC RBC AUTO-ENTMCNC: 31.7 G/DL (ref 31–37)
MCHC RBC AUTO-ENTMCNC: 31.8 G/DL (ref 31–37)
MCHC RBC AUTO-ENTMCNC: 31.9 G/DL (ref 31–37)
MCHC RBC AUTO-ENTMCNC: 31.9 G/DL (ref 31–37)
MCHC RBC AUTO-ENTMCNC: 32 G/DL (ref 31–37)
MCHC RBC AUTO-ENTMCNC: 32.1 G/DL (ref 31–37)
MCHC RBC AUTO-ENTMCNC: 32.4 G/DL (ref 31–37)
MCHC RBC AUTO-ENTMCNC: 32.5 G/DL (ref 31–37)
MCHC RBC AUTO-ENTMCNC: 32.6 G/DL (ref 31–37)
MCHC RBC AUTO-ENTMCNC: 32.9 G/DL (ref 31–37)
MCHC RBC AUTO-ENTMCNC: 33.4 G/DL (ref 31–37)
MCHC RBC AUTO-ENTMCNC: 33.4 G/DL (ref 31–37)
MCV RBC AUTO: 100 FL
MCV RBC AUTO: 87.2 FL
MCV RBC AUTO: 88.2 FL
MCV RBC AUTO: 88.8 FL
MCV RBC AUTO: 88.9 FL
MCV RBC AUTO: 89.8 FL
MCV RBC AUTO: 90.2 FL
MCV RBC AUTO: 90.7 FL
MCV RBC AUTO: 90.7 FL
MCV RBC AUTO: 90.9 FL
MCV RBC AUTO: 91.2 FL
MCV RBC AUTO: 91.8 FL
MCV RBC AUTO: 92 FL
MCV RBC AUTO: 92 FL
MCV RBC AUTO: 92.9 FL
MCV RBC AUTO: 93.1 FL
MCV RBC AUTO: 93.7 FL
MCV RBC AUTO: 94 FL
MCV RBC AUTO: 94.4 FL
MCV RBC AUTO: 95 FL
MCV RBC AUTO: 95.6 FL
MCV RBC AUTO: 95.8 FL
MCV RBC AUTO: 95.9 FL
MCV RBC AUTO: 95.9 FL
MCV RBC AUTO: 96 FL
MCV RBC AUTO: 96.3 FL
MCV RBC AUTO: 96.8 FL
MCV RBC AUTO: 96.8 FL
MCV RBC AUTO: 96.9 FL
MCV RBC AUTO: 97.6 FL
MCV RBC AUTO: 98.1 FL
METHGB MFR BLD: 1.5 % SAT (ref 0.4–1.5)
METHGB MFR BLD: 1.7 % SAT (ref 0.4–1.5)
MODIFIED ALLEN TEST: POSITIVE
MODIFIED ALLEN TEST: POSITIVE
MONOCYTES # BLD AUTO: 0.09 X10(3) UL (ref 0.1–1)
MONOCYTES # BLD AUTO: 0.22 X10(3) UL (ref 0.1–1)
MONOCYTES # BLD AUTO: 0.26 X10(3) UL (ref 0.1–1)
MONOCYTES # BLD AUTO: 0.34 X10(3) UL (ref 0.1–1)
MONOCYTES # BLD AUTO: 0.43 X10(3) UL (ref 0.1–1)
MONOCYTES # BLD AUTO: 0.44 X10(3) UL (ref 0.1–1)
MONOCYTES # BLD AUTO: 0.47 X10(3) UL (ref 0.1–1)
MONOCYTES # BLD AUTO: 0.5 X10(3) UL (ref 0.1–1)
MONOCYTES # BLD AUTO: 0.51 X10(3) UL (ref 0.1–1)
MONOCYTES # BLD AUTO: 0.52 X10(3) UL (ref 0.1–1)
MONOCYTES # BLD AUTO: 0.53 X10(3) UL (ref 0.1–1)
MONOCYTES # BLD AUTO: 0.53 X10(3) UL (ref 0.1–1)
MONOCYTES # BLD AUTO: 0.54 X10(3) UL (ref 0.1–1)
MONOCYTES # BLD AUTO: 0.55 X10(3) UL (ref 0.1–1)
MONOCYTES # BLD AUTO: 0.57 X10(3) UL (ref 0.1–1)
MONOCYTES # BLD AUTO: 0.59 X10(3) UL (ref 0.1–1)
MONOCYTES # BLD AUTO: 0.61 X10(3) UL (ref 0.1–1)
MONOCYTES # BLD AUTO: 0.61 X10(3) UL (ref 0.1–1)
MONOCYTES # BLD AUTO: 0.62 X10(3) UL (ref 0.1–1)
MONOCYTES # BLD AUTO: 0.63 X10(3) UL (ref 0.1–1)
MONOCYTES # BLD AUTO: 0.64 X10(3) UL (ref 0.1–1)
MONOCYTES # BLD AUTO: 0.66 X10(3) UL (ref 0.1–1)
MONOCYTES # BLD AUTO: 0.69 X10(3) UL (ref 0.1–1)
MONOCYTES # BLD AUTO: 0.71 X10(3) UL (ref 0.1–1)
MONOCYTES # BLD AUTO: 0.71 X10(3) UL (ref 0.1–1)
MONOCYTES # BLD AUTO: 0.79 X10(3) UL (ref 0.1–1)
MONOCYTES # BLD AUTO: 0.8 X10(3) UL (ref 0.1–1)
MONOCYTES # BLD: 0.3 X10(3) UL (ref 0.1–1)
MONOCYTES NFR BLD AUTO: 10.2 %
MONOCYTES NFR BLD AUTO: 2.5 %
MONOCYTES NFR BLD AUTO: 4.2 %
MONOCYTES NFR BLD AUTO: 4.2 %
MONOCYTES NFR BLD AUTO: 4.4 %
MONOCYTES NFR BLD AUTO: 4.8 %
MONOCYTES NFR BLD AUTO: 5.1 %
MONOCYTES NFR BLD AUTO: 5.3 %
MONOCYTES NFR BLD AUTO: 5.5 %
MONOCYTES NFR BLD AUTO: 5.7 %
MONOCYTES NFR BLD AUTO: 5.7 %
MONOCYTES NFR BLD AUTO: 5.9 %
MONOCYTES NFR BLD AUTO: 5.9 %
MONOCYTES NFR BLD AUTO: 6 %
MONOCYTES NFR BLD AUTO: 6.2 %
MONOCYTES NFR BLD AUTO: 6.4 %
MONOCYTES NFR BLD AUTO: 6.5 %
MONOCYTES NFR BLD AUTO: 6.6 %
MONOCYTES NFR BLD AUTO: 7 %
MONOCYTES NFR BLD AUTO: 7.5 %
MONOCYTES NFR BLD AUTO: 7.6 %
MONOCYTES NFR BLD AUTO: 7.7 %
MONOCYTES NFR BLD AUTO: 7.8 %
MONOCYTES NFR BLD AUTO: 8.5 %
MONOCYTES NFR BLD AUTO: 8.8 %
MONOCYTES NFR BLD AUTO: 9.1 %
MONOCYTES NFR BLD AUTO: 9.1 %
MONOCYTES NFR BLD AUTO: 9.9 %
MONOCYTES NFR BLD: 3 %
MONOS+MACROS NFR PLR: 54 %
MORPHOLOGY: NORMAL
MUSK ANTIBODY: 0 NMOL/L
N-TYPE CALCIUM CHANNEL ANTIBODY: 0 PMOL/L
NEURONAL ANTIBODY (AMPHIPHYSIN): NEGATIVE
NEUTROPHILS # BLD AUTO: 10.45 X10 (3) UL (ref 1.5–7.7)
NEUTROPHILS # BLD AUTO: 10.45 X10(3) UL (ref 1.5–7.7)
NEUTROPHILS # BLD AUTO: 11.23 X10 (3) UL (ref 1.5–7.7)
NEUTROPHILS # BLD AUTO: 11.23 X10(3) UL (ref 1.5–7.7)
NEUTROPHILS # BLD AUTO: 13.14 X10 (3) UL (ref 1.5–7.7)
NEUTROPHILS # BLD AUTO: 13.14 X10(3) UL (ref 1.5–7.7)
NEUTROPHILS # BLD AUTO: 15.62 X10 (3) UL (ref 1.5–7.7)
NEUTROPHILS # BLD AUTO: 15.62 X10(3) UL (ref 1.5–7.7)
NEUTROPHILS # BLD AUTO: 15.67 X10 (3) UL (ref 1.5–7.7)
NEUTROPHILS # BLD AUTO: 15.67 X10(3) UL (ref 1.5–7.7)
NEUTROPHILS # BLD AUTO: 3.07 X10 (3) UL (ref 1.5–7.7)
NEUTROPHILS # BLD AUTO: 3.07 X10(3) UL (ref 1.5–7.7)
NEUTROPHILS # BLD AUTO: 3.31 X10 (3) UL (ref 1.5–7.7)
NEUTROPHILS # BLD AUTO: 3.31 X10(3) UL (ref 1.5–7.7)
NEUTROPHILS # BLD AUTO: 3.55 X10 (3) UL (ref 1.5–7.7)
NEUTROPHILS # BLD AUTO: 3.55 X10(3) UL (ref 1.5–7.7)
NEUTROPHILS # BLD AUTO: 3.83 X10 (3) UL (ref 1.5–7.7)
NEUTROPHILS # BLD AUTO: 3.83 X10(3) UL (ref 1.5–7.7)
NEUTROPHILS # BLD AUTO: 4.43 X10 (3) UL (ref 1.5–7.7)
NEUTROPHILS # BLD AUTO: 4.43 X10(3) UL (ref 1.5–7.7)
NEUTROPHILS # BLD AUTO: 4.58 X10 (3) UL (ref 1.5–7.7)
NEUTROPHILS # BLD AUTO: 4.58 X10(3) UL (ref 1.5–7.7)
NEUTROPHILS # BLD AUTO: 4.65 X10 (3) UL (ref 1.5–7.7)
NEUTROPHILS # BLD AUTO: 4.65 X10(3) UL (ref 1.5–7.7)
NEUTROPHILS # BLD AUTO: 4.85 X10 (3) UL (ref 1.5–7.7)
NEUTROPHILS # BLD AUTO: 4.85 X10(3) UL (ref 1.5–7.7)
NEUTROPHILS # BLD AUTO: 4.89 X10 (3) UL (ref 1.5–7.7)
NEUTROPHILS # BLD AUTO: 4.89 X10(3) UL (ref 1.5–7.7)
NEUTROPHILS # BLD AUTO: 4.98 X10 (3) UL (ref 1.5–7.7)
NEUTROPHILS # BLD AUTO: 4.98 X10(3) UL (ref 1.5–7.7)
NEUTROPHILS # BLD AUTO: 5.34 X10 (3) UL (ref 1.5–7.7)
NEUTROPHILS # BLD AUTO: 5.34 X10(3) UL (ref 1.5–7.7)
NEUTROPHILS # BLD AUTO: 5.41 X10 (3) UL (ref 1.5–7.7)
NEUTROPHILS # BLD AUTO: 5.41 X10(3) UL (ref 1.5–7.7)
NEUTROPHILS # BLD AUTO: 5.42 X10 (3) UL (ref 1.5–7.7)
NEUTROPHILS # BLD AUTO: 5.42 X10(3) UL (ref 1.5–7.7)
NEUTROPHILS # BLD AUTO: 6.83 X10 (3) UL (ref 1.5–7.7)
NEUTROPHILS # BLD AUTO: 6.83 X10(3) UL (ref 1.5–7.7)
NEUTROPHILS # BLD AUTO: 7.1 X10 (3) UL (ref 1.5–7.7)
NEUTROPHILS # BLD AUTO: 7.1 X10(3) UL (ref 1.5–7.7)
NEUTROPHILS # BLD AUTO: 7.15 X10 (3) UL (ref 1.5–7.7)
NEUTROPHILS # BLD AUTO: 7.15 X10(3) UL (ref 1.5–7.7)
NEUTROPHILS # BLD AUTO: 7.28 X10 (3) UL (ref 1.5–7.7)
NEUTROPHILS # BLD AUTO: 7.28 X10(3) UL (ref 1.5–7.7)
NEUTROPHILS # BLD AUTO: 7.51 X10 (3) UL (ref 1.5–7.7)
NEUTROPHILS # BLD AUTO: 7.51 X10(3) UL (ref 1.5–7.7)
NEUTROPHILS # BLD AUTO: 7.7 X10 (3) UL (ref 1.5–7.7)
NEUTROPHILS # BLD AUTO: 7.7 X10(3) UL (ref 1.5–7.7)
NEUTROPHILS # BLD AUTO: 7.79 X10 (3) UL (ref 1.5–7.7)
NEUTROPHILS # BLD AUTO: 7.79 X10(3) UL (ref 1.5–7.7)
NEUTROPHILS # BLD AUTO: 7.89 X10 (3) UL (ref 1.5–7.7)
NEUTROPHILS # BLD AUTO: 7.89 X10(3) UL (ref 1.5–7.7)
NEUTROPHILS # BLD AUTO: 8.04 X10 (3) UL (ref 1.5–7.7)
NEUTROPHILS # BLD AUTO: 8.04 X10(3) UL (ref 1.5–7.7)
NEUTROPHILS # BLD AUTO: 8.18 X10 (3) UL (ref 1.5–7.7)
NEUTROPHILS # BLD AUTO: 8.18 X10(3) UL (ref 1.5–7.7)
NEUTROPHILS # BLD AUTO: 8.93 X10 (3) UL (ref 1.5–7.7)
NEUTROPHILS # BLD AUTO: 8.93 X10(3) UL (ref 1.5–7.7)
NEUTROPHILS # BLD AUTO: 9.06 X10 (3) UL (ref 1.5–7.7)
NEUTROPHILS # BLD AUTO: 9.06 X10(3) UL (ref 1.5–7.7)
NEUTROPHILS # BLD AUTO: 9.2 X10 (3) UL (ref 1.5–7.7)
NEUTROPHILS # BLD AUTO: 9.85 X10 (3) UL (ref 1.5–7.7)
NEUTROPHILS # BLD AUTO: 9.85 X10(3) UL (ref 1.5–7.7)
NEUTROPHILS # BLD AUTO: 9.98 X10 (3) UL (ref 1.5–7.7)
NEUTROPHILS # BLD AUTO: 9.98 X10(3) UL (ref 1.5–7.7)
NEUTROPHILS NFR BLD AUTO: 69.6 %
NEUTROPHILS NFR BLD AUTO: 70.7 %
NEUTROPHILS NFR BLD AUTO: 74 %
NEUTROPHILS NFR BLD AUTO: 75.7 %
NEUTROPHILS NFR BLD AUTO: 77 %
NEUTROPHILS NFR BLD AUTO: 80.1 %
NEUTROPHILS NFR BLD AUTO: 80.9 %
NEUTROPHILS NFR BLD AUTO: 80.9 %
NEUTROPHILS NFR BLD AUTO: 82.1 %
NEUTROPHILS NFR BLD AUTO: 82.4 %
NEUTROPHILS NFR BLD AUTO: 82.6 %
NEUTROPHILS NFR BLD AUTO: 82.6 %
NEUTROPHILS NFR BLD AUTO: 83 %
NEUTROPHILS NFR BLD AUTO: 83.5 %
NEUTROPHILS NFR BLD AUTO: 83.9 %
NEUTROPHILS NFR BLD AUTO: 84.1 %
NEUTROPHILS NFR BLD AUTO: 84.8 %
NEUTROPHILS NFR BLD AUTO: 84.8 %
NEUTROPHILS NFR BLD AUTO: 85 %
NEUTROPHILS NFR BLD AUTO: 85.3 %
NEUTROPHILS NFR BLD AUTO: 85.3 %
NEUTROPHILS NFR BLD AUTO: 85.5 %
NEUTROPHILS NFR BLD AUTO: 85.6 %
NEUTROPHILS NFR BLD AUTO: 85.9 %
NEUTROPHILS NFR BLD AUTO: 86 %
NEUTROPHILS NFR BLD AUTO: 86.3 %
NEUTROPHILS NFR BLD AUTO: 87.4 %
NEUTROPHILS NFR BLD AUTO: 87.7 %
NEUTROPHILS NFR BLD AUTO: 90.2 %
NEUTROPHILS NFR BLD AUTO: 91.9 %
NEUTROPHILS NFR BLD AUTO: 92 %
NEUTROPHILS NFR BLD AUTO: 92.4 %
NEUTROPHILS NFR BLD: 62 %
NEUTROPHILS NFR PLR: 16 %
NEUTS BAND NFR BLD: 25 %
NEUTS HYPERSEG # BLD: 8.79 X10(3) UL (ref 1.5–7.7)
NITRITE UR QL STRIP.AUTO: NEGATIVE
NITRITE UR QL STRIP.AUTO: NEGATIVE
NONHDLC SERPL-MCNC: 63 MG/DL (ref ?–130)
NT-PROBNP SERPL-MCNC: 4409 PG/ML (ref ?–450)
NUCLEAR IGG TITR SER IF: POSITIVE {TITER}
O2 CT BLD-SCNC: 10.2 VOL% (ref 15–23)
O2 CT BLD-SCNC: 11.3 VOL% (ref 15–23)
O2 CT BLD-SCNC: 11.7 VOL% (ref 15–23)
O2 CT BLD-SCNC: 15.7 VOL% (ref 15–23)
O2 CT BLD-SCNC: 17.4 VOL% (ref 15–23)
O2 CT BLD-SCNC: 17.6 VOL% (ref 15–23)
O2 CT BLD-SCNC: 17.6 VOL% (ref 15–23)
O2/TOTAL GAS SETTING VFR VENT: 100 %
O2/TOTAL GAS SETTING VFR VENT: 35 %
O2/TOTAL GAS SETTING VFR VENT: 60 %
O2/TOTAL GAS SETTING VFR VENT: 80 %
OSMOLALITY SERPL CALC.SUM OF ELEC: 284 MOSM/KG (ref 275–295)
OSMOLALITY SERPL CALC.SUM OF ELEC: 290 MOSM/KG (ref 275–295)
OSMOLALITY SERPL CALC.SUM OF ELEC: 296 MOSM/KG (ref 275–295)
OSMOLALITY SERPL CALC.SUM OF ELEC: 297 MOSM/KG (ref 275–295)
OSMOLALITY SERPL CALC.SUM OF ELEC: 297 MOSM/KG (ref 275–295)
OSMOLALITY SERPL CALC.SUM OF ELEC: 301 MOSM/KG (ref 275–295)
OSMOLALITY SERPL CALC.SUM OF ELEC: 301 MOSM/KG (ref 275–295)
OSMOLALITY SERPL CALC.SUM OF ELEC: 302 MOSM/KG (ref 275–295)
OSMOLALITY SERPL CALC.SUM OF ELEC: 302 MOSM/KG (ref 275–295)
OSMOLALITY SERPL CALC.SUM OF ELEC: 303 MOSM/KG (ref 275–295)
OSMOLALITY SERPL CALC.SUM OF ELEC: 304 MOSM/KG (ref 275–295)
OSMOLALITY SERPL CALC.SUM OF ELEC: 304 MOSM/KG (ref 275–295)
OSMOLALITY SERPL CALC.SUM OF ELEC: 305 MOSM/KG (ref 275–295)
OSMOLALITY SERPL CALC.SUM OF ELEC: 305 MOSM/KG (ref 275–295)
OSMOLALITY SERPL CALC.SUM OF ELEC: 306 MOSM/KG (ref 275–295)
OSMOLALITY SERPL CALC.SUM OF ELEC: 308 MOSM/KG (ref 275–295)
OSMOLALITY SERPL CALC.SUM OF ELEC: 310 MOSM/KG (ref 275–295)
OSMOLALITY SERPL CALC.SUM OF ELEC: 313 MOSM/KG (ref 275–295)
OSMOLALITY SERPL CALC.SUM OF ELEC: 313 MOSM/KG (ref 275–295)
OSMOLALITY SERPL CALC.SUM OF ELEC: 314 MOSM/KG (ref 275–295)
OSMOLALITY SERPL CALC.SUM OF ELEC: 317 MOSM/KG (ref 275–295)
OSMOLALITY SERPL CALC.SUM OF ELEC: 320 MOSM/KG (ref 275–295)
OSMOLALITY SERPL CALC.SUM OF ELEC: 320 MOSM/KG (ref 275–295)
OSMOLALITY SERPL CALC.SUM OF ELEC: 323 MOSM/KG (ref 275–295)
OXYGEN LITERS/MINUTE: 40 L/MIN
OXYGEN LITERS/MINUTE: 40 L/MIN
P/Q-TYPE CALCIUM CHANNEL ANTIBODY: 7.3 PMOL/L
PCO2 BLDA: 38 MM HG (ref 35–45)
PCO2 BLDA: 41 MM HG (ref 35–45)
PCO2 BLDA: 46 MM HG (ref 35–45)
PCO2 BLDA: 47 MM HG (ref 35–45)
PCO2 BLDA: 48 MM HG (ref 35–45)
PCO2 BLDA: 73 MM HG (ref 35–45)
PCO2 BLDA: 76 MM HG (ref 35–45)
PEEP SETTING VENT: 5 CM H2O
PH BLDA: 7.17 [PH] (ref 7.35–7.45)
PH BLDA: 7.22 [PH] (ref 7.35–7.45)
PH BLDA: 7.23 [PH] (ref 7.35–7.45)
PH BLDA: 7.28 [PH] (ref 7.35–7.45)
PH BLDA: 7.29 [PH] (ref 7.35–7.45)
PH BLDA: 7.39 [PH] (ref 7.35–7.45)
PH BLDA: 7.49 [PH] (ref 7.35–7.45)
PH UR: 5 [PH] (ref 5–8)
PH UR: 7 [PH] (ref 5–8)
PHOSPHATE SERPL-MCNC: 2.3 MG/DL (ref 2.5–4.9)
PHOSPHATE SERPL-MCNC: 2.4 MG/DL (ref 2.5–4.9)
PHOSPHATE SERPL-MCNC: 2.5 MG/DL (ref 2.5–4.9)
PHOSPHATE SERPL-MCNC: 3.4 MG/DL (ref 2.5–4.9)
PHOSPHATE SERPL-MCNC: 3.4 MG/DL (ref 2.5–4.9)
PHOSPHATE SERPL-MCNC: 3.6 MG/DL (ref 2.5–4.9)
PHOSPHATE SERPL-MCNC: 3.7 MG/DL (ref 2.5–4.9)
PHOSPHATE SERPL-MCNC: 4.1 MG/DL (ref 2.5–4.9)
PLATELET # BLD AUTO: 113 10(3)UL (ref 150–450)
PLATELET # BLD AUTO: 119 10(3)UL (ref 150–450)
PLATELET # BLD AUTO: 121 10(3)UL (ref 150–450)
PLATELET # BLD AUTO: 139 10(3)UL (ref 150–450)
PLATELET # BLD AUTO: 140 10(3)UL (ref 150–450)
PLATELET # BLD AUTO: 158 10(3)UL (ref 150–450)
PLATELET # BLD AUTO: 169 10(3)UL (ref 150–450)
PLATELET # BLD AUTO: 170 10(3)UL (ref 150–450)
PLATELET # BLD AUTO: 170 10(3)UL (ref 150–450)
PLATELET # BLD AUTO: 177 10(3)UL (ref 150–450)
PLATELET # BLD AUTO: 179 10(3)UL (ref 150–450)
PLATELET # BLD AUTO: 185 10(3)UL (ref 150–450)
PLATELET # BLD AUTO: 192 10(3)UL (ref 150–450)
PLATELET # BLD AUTO: 195 10(3)UL (ref 150–450)
PLATELET # BLD AUTO: 197 10(3)UL (ref 150–450)
PLATELET # BLD AUTO: 204 10(3)UL (ref 150–450)
PLATELET # BLD AUTO: 206 10(3)UL (ref 150–450)
PLATELET # BLD AUTO: 206 10(3)UL (ref 150–450)
PLATELET # BLD AUTO: 208 10(3)UL (ref 150–450)
PLATELET # BLD AUTO: 209 10(3)UL (ref 150–450)
PLATELET # BLD AUTO: 215 10(3)UL (ref 150–450)
PLATELET # BLD AUTO: 222 10(3)UL (ref 150–450)
PLATELET # BLD AUTO: 230 10(3)UL (ref 150–450)
PLATELET # BLD AUTO: 252 10(3)UL (ref 150–450)
PLATELET # BLD AUTO: 264 10(3)UL (ref 150–450)
PLATELET # BLD AUTO: 275 10(3)UL (ref 150–450)
PLATELET # BLD AUTO: 276 10(3)UL (ref 150–450)
PLATELET # BLD AUTO: 278 10(3)UL (ref 150–450)
PLATELET # BLD AUTO: 299 10(3)UL (ref 150–450)
PLATELET # BLD AUTO: 319 10(3)UL (ref 150–450)
PLATELET # BLD AUTO: 331 10(3)UL (ref 150–450)
PLATELET # BLD AUTO: 343 10(3)UL (ref 150–450)
PLATELET # BLD AUTO: 345 10(3)UL (ref 150–450)
PLATELET MORPHOLOGY: NORMAL
PLATELET MORPHOLOGY: NORMAL
PO2 BLDA: 112 MM HG (ref 80–100)
PO2 BLDA: 255 MM HG (ref 80–100)
PO2 BLDA: 65 MM HG (ref 80–100)
PO2 BLDA: 78 MM HG (ref 80–100)
PO2 BLDA: 84 MM HG (ref 80–100)
PO2 BLDA: 89 MM HG (ref 80–100)
PO2 BLDA: 99 MM HG (ref 80–100)
POTASSIUM (BLOOD GAS): 4.4 MMOL/L (ref 3.3–5.1)
POTASSIUM (BLOOD GAS): 5.7 MMOL/L (ref 3.3–5.1)
POTASSIUM SERPL-SCNC: 3.7 MMOL/L (ref 3.5–5.1)
POTASSIUM SERPL-SCNC: 4 MMOL/L (ref 3.5–5.1)
POTASSIUM SERPL-SCNC: 4 MMOL/L (ref 3.5–5.1)
POTASSIUM SERPL-SCNC: 4.1 MMOL/L (ref 3.5–5.1)
POTASSIUM SERPL-SCNC: 4.1 MMOL/L (ref 3.5–5.1)
POTASSIUM SERPL-SCNC: 4.2 MMOL/L (ref 3.5–5.1)
POTASSIUM SERPL-SCNC: 4.2 MMOL/L (ref 3.5–5.1)
POTASSIUM SERPL-SCNC: 4.3 MMOL/L (ref 3.5–5.1)
POTASSIUM SERPL-SCNC: 4.4 MMOL/L (ref 3.5–5.1)
POTASSIUM SERPL-SCNC: 4.5 MMOL/L (ref 3.5–5.1)
POTASSIUM SERPL-SCNC: 4.6 MMOL/L (ref 3.5–5.1)
POTASSIUM SERPL-SCNC: 4.7 MMOL/L (ref 3.5–5.1)
POTASSIUM SERPL-SCNC: 4.7 MMOL/L (ref 3.5–5.1)
POTASSIUM SERPL-SCNC: 4.8 MMOL/L (ref 3.5–5.1)
POTASSIUM SERPL-SCNC: 4.8 MMOL/L (ref 3.5–5.1)
POTASSIUM SERPL-SCNC: 4.9 MMOL/L (ref 3.5–5.1)
POTASSIUM SERPL-SCNC: 5 MMOL/L (ref 3.5–5.1)
POTASSIUM SERPL-SCNC: 5.1 MMOL/L (ref 3.5–5.1)
POTASSIUM SERPL-SCNC: 5.1 MMOL/L (ref 3.5–5.1)
POTASSIUM SERPL-SCNC: 5.3 MMOL/L (ref 3.5–5.1)
POTASSIUM SERPL-SCNC: 5.5 MMOL/L (ref 3.5–5.1)
POTASSIUM SERPL-SCNC: 5.6 MMOL/L (ref 3.5–5.1)
PRESSURE SUPPORT SETTING VENT: 10 CM H2O
PROT PLR-MCNC: 1.7 G/DL
PROT UR-MCNC: 30 MG/DL
PROT UR-MCNC: NEGATIVE MG/DL
PUNCTURE CHARGE: NO
PUNCTURE CHARGE: YES
RBC # BLD AUTO: 2.21 X10(6)UL
RBC # BLD AUTO: 2.45 X10(6)UL
RBC # BLD AUTO: 2.47 X10(6)UL
RBC # BLD AUTO: 2.49 X10(6)UL
RBC # BLD AUTO: 2.5 X10(6)UL
RBC # BLD AUTO: 2.55 X10(6)UL
RBC # BLD AUTO: 2.58 X10(6)UL
RBC # BLD AUTO: 2.61 X10(6)UL
RBC # BLD AUTO: 2.62 X10(6)UL
RBC # BLD AUTO: 2.64 X10(6)UL
RBC # BLD AUTO: 2.64 X10(6)UL
RBC # BLD AUTO: 2.65 X10(6)UL
RBC # BLD AUTO: 2.65 X10(6)UL
RBC # BLD AUTO: 2.68 X10(6)UL
RBC # BLD AUTO: 2.68 X10(6)UL
RBC # BLD AUTO: 2.7 X10(6)UL
RBC # BLD AUTO: 2.7 X10(6)UL
RBC # BLD AUTO: 2.73 X10(6)UL
RBC # BLD AUTO: 2.75 X10(6)UL
RBC # BLD AUTO: 2.8 X10(6)UL
RBC # BLD AUTO: 2.83 X10(6)UL
RBC # BLD AUTO: 3.27 X10(6)UL
RBC # BLD AUTO: 3.28 X10(6)UL
RBC # BLD AUTO: 3.31 X10(6)UL
RBC # BLD AUTO: 3.32 X10(6)UL
RBC # BLD AUTO: 3.5 X10(6)UL
RBC # BLD AUTO: 3.53 X10(6)UL
RBC # BLD AUTO: 3.56 X10(6)UL
RBC # BLD AUTO: 4.31 X10(6)UL
RBC # BLD AUTO: 4.39 X10(6)UL
RBC # BLD AUTO: 4.46 X10(6)UL
RBC # BLD AUTO: 4.48 X10(6)UL
RBC # BLD AUTO: 4.76 X10(6)UL
RBC # FLD: 673 /CUMM (ref ?–1)
RBC #/AREA URNS AUTO: >10 /HPF
RESP RATE: 10 BPM
RESP RATE: 14 BPM
RESP RATE: 20 BPM
RH BLOOD TYPE: POSITIVE
RHEUMATOID FACT SERPL-ACNC: <10 IU/ML (ref ?–15)
SAO2 % BLDA: 95 % (ref 94–100)
SAO2 % BLDA: 98 % (ref 94–100)
SAO2 % BLDA: 98.8 % (ref 94–100)
SAO2 % BLDA: 99 % (ref 94–100)
SAO2 % BLDA: >99 % (ref 94–100)
SARS-COV-2 RNA RESP QL NAA+PROBE: NOT DETECTED
SODIUM (BLOOD GAS): 134 MMOL/L (ref 135–145)
SODIUM (BLOOD GAS): 141 MMOL/L (ref 135–145)
SODIUM SERPL-SCNC: 137 MMOL/L (ref 136–145)
SODIUM SERPL-SCNC: 139 MMOL/L (ref 136–145)
SODIUM SERPL-SCNC: 140 MMOL/L (ref 136–145)
SODIUM SERPL-SCNC: 141 MMOL/L (ref 136–145)
SODIUM SERPL-SCNC: 142 MMOL/L (ref 136–145)
SODIUM SERPL-SCNC: 143 MMOL/L (ref 136–145)
SODIUM SERPL-SCNC: 144 MMOL/L (ref 136–145)
SODIUM SERPL-SCNC: 145 MMOL/L (ref 136–145)
SODIUM SERPL-SCNC: 146 MMOL/L (ref 136–145)
SODIUM SERPL-SCNC: 149 MMOL/L (ref 136–145)
SODIUM SERPL-SCNC: 149 MMOL/L (ref 136–145)
SOX1 ANTIBODY, IGG BY IMMUNOBLOT, SERUM: NEGATIVE
SP GR UR STRIP: 1.02 (ref 1–1.03)
SP GR UR STRIP: 1.02 (ref 1–1.03)
SPECIMEN VOL 24H UR: 400 ML
SPECIMEN VOL 24H UR: 550 ML
T4 FREE SERPL-MCNC: 0.9 NG/DL (ref 0.8–1.7)
TITIN ANTIBODY: <0.09 IV
TITIN ANTIBODY: <0.09 IV
TOTAL CELLS COUNTED: 100
TOTAL CELLS COUNTED: 100
TOTAL IRON BINDING CAPACITY: 103 UG/DL (ref 240–450)
TOTAL IRON BINDING CAPACITY: 188 UG/DL (ref 240–450)
TRANSFERRIN SERPL-MCNC: 126 MG/DL (ref 200–360)
TRANSFERRIN SERPL-MCNC: 69 MG/DL (ref 200–360)
TRIGL SERPL-MCNC: 77 MG/DL (ref 30–149)
TROPONIN I SERPL-MCNC: <0.045 NG/ML (ref ?–0.04)
TROPONIN I SERPL-MCNC: <0.045 NG/ML (ref ?–0.04)
TSI SER-ACNC: 3.17 MIU/ML (ref 0.36–3.74)
TURBIDITY CSF QL: CLEAR
UROBILINOGEN UR STRIP-ACNC: <2
UROBILINOGEN UR STRIP-ACNC: <2
VANCOMYCIN PEAK SERPL-MCNC: 20.9 UG/ML (ref 30–50)
VANCOMYCIN TROUGH SERPL-MCNC: 17.9 UG/ML (ref 10–20)
VANCOMYCIN TROUGH SERPL-MCNC: 6.2 UG/ML (ref 10–20)
VLDLC SERPL CALC-MCNC: 11 MG/DL (ref 0–30)
VOLTAGE-GATED POTASSIUM CHANNEL: 0 PMOL/L
WBC # BLD AUTO: 10.1 X10(3) UL (ref 4–11)
WBC # BLD AUTO: 10.5 X10(3) UL (ref 4–11)
WBC # BLD AUTO: 10.6 X10(3) UL (ref 4–11)
WBC # BLD AUTO: 11.4 X10(3) UL (ref 4–11)
WBC # BLD AUTO: 11.6 X10(3) UL (ref 4–11)
WBC # BLD AUTO: 12 X10(3) UL (ref 4–11)
WBC # BLD AUTO: 12.2 X10(3) UL (ref 4–11)
WBC # BLD AUTO: 15 X10(3) UL (ref 4–11)
WBC # BLD AUTO: 17 X10(3) UL (ref 4–11)
WBC # BLD AUTO: 17 X10(3) UL (ref 4–11)
WBC # BLD AUTO: 3.6 X10(3) UL (ref 4–11)
WBC # BLD AUTO: 4.6 X10(3) UL (ref 4–11)
WBC # BLD AUTO: 4.8 X10(3) UL (ref 4–11)
WBC # BLD AUTO: 5 X10(3) UL (ref 4–11)
WBC # BLD AUTO: 5.4 X10(3) UL (ref 4–11)
WBC # BLD AUTO: 5.6 X10(3) UL (ref 4–11)
WBC # BLD AUTO: 5.8 X10(3) UL (ref 4–11)
WBC # BLD AUTO: 6 X10(3) UL (ref 4–11)
WBC # BLD AUTO: 6 X10(3) UL (ref 4–11)
WBC # BLD AUTO: 6.6 X10(3) UL (ref 4–11)
WBC # BLD AUTO: 6.7 X10(3) UL (ref 4–11)
WBC # BLD AUTO: 6.7 X10(3) UL (ref 4–11)
WBC # BLD AUTO: 7 X10(3) UL (ref 4–11)
WBC # BLD AUTO: 8 X10(3) UL (ref 4–11)
WBC # BLD AUTO: 8.5 X10(3) UL (ref 4–11)
WBC # BLD AUTO: 8.7 X10(3) UL (ref 4–11)
WBC # BLD AUTO: 8.8 X10(3) UL (ref 4–11)
WBC # BLD AUTO: 8.8 X10(3) UL (ref 4–11)
WBC # BLD AUTO: 8.9 X10(3) UL (ref 4–11)
WBC # BLD AUTO: 9 X10(3) UL (ref 4–11)
WBC # BLD AUTO: 9.3 X10(3) UL (ref 4–11)
WBC # BLD AUTO: 9.6 X10(3) UL (ref 4–11)
WBC # BLD AUTO: 9.9 X10(3) UL (ref 4–11)
WBC # FLD: 61 /CUMM (ref ?–1)
WBC #/AREA URNS AUTO: >50 /HPF
WBC OTHER NFR PLR: 1 %

## 2021-01-01 PROCEDURE — 71045 X-RAY EXAM CHEST 1 VIEW: CPT | Performed by: INTERNAL MEDICINE

## 2021-01-01 PROCEDURE — 71045 X-RAY EXAM CHEST 1 VIEW: CPT | Performed by: CLINICAL NURSE SPECIALIST

## 2021-01-01 PROCEDURE — 99233 SBSQ HOSP IP/OBS HIGH 50: CPT | Performed by: OTHER

## 2021-01-01 PROCEDURE — 99233 SBSQ HOSP IP/OBS HIGH 50: CPT | Performed by: INTERNAL MEDICINE

## 2021-01-01 PROCEDURE — 99291 CRITICAL CARE FIRST HOUR: CPT | Performed by: INTERNAL MEDICINE

## 2021-01-01 PROCEDURE — 76770 US EXAM ABDO BACK WALL COMP: CPT | Performed by: INTERNAL MEDICINE

## 2021-01-01 PROCEDURE — 99232 SBSQ HOSP IP/OBS MODERATE 35: CPT | Performed by: OTHER

## 2021-01-01 PROCEDURE — 0BH17EZ INSERTION OF ENDOTRACHEAL AIRWAY INTO TRACHEA, VIA NATURAL OR ARTIFICIAL OPENING: ICD-10-PCS | Performed by: INTERNAL MEDICINE

## 2021-01-01 PROCEDURE — 99291 CRITICAL CARE FIRST HOUR: CPT | Performed by: OTHER

## 2021-01-01 PROCEDURE — 71045 X-RAY EXAM CHEST 1 VIEW: CPT | Performed by: HOSPITALIST

## 2021-01-01 PROCEDURE — 99233 SBSQ HOSP IP/OBS HIGH 50: CPT | Performed by: HOSPITALIST

## 2021-01-01 PROCEDURE — 71260 CT THORAX DX C+: CPT | Performed by: PHYSICIAN ASSISTANT

## 2021-01-01 PROCEDURE — 99223 1ST HOSP IP/OBS HIGH 75: CPT | Performed by: INTERNAL MEDICINE

## 2021-01-01 PROCEDURE — 99231 SBSQ HOSP IP/OBS SF/LOW 25: CPT | Performed by: OTHER

## 2021-01-01 PROCEDURE — 93306 TTE W/DOPPLER COMPLETE: CPT | Performed by: OTHER

## 2021-01-01 PROCEDURE — 84466 ASSAY OF TRANSFERRIN: CPT | Performed by: INTERNAL MEDICINE

## 2021-01-01 PROCEDURE — 70450 CT HEAD/BRAIN W/O DYE: CPT | Performed by: EMERGENCY MEDICINE

## 2021-01-01 PROCEDURE — 31500 INSERT EMERGENCY AIRWAY: CPT | Performed by: INTERNAL MEDICINE

## 2021-01-01 PROCEDURE — 82607 VITAMIN B-12: CPT | Performed by: INTERNAL MEDICINE

## 2021-01-01 PROCEDURE — 95861 NEEDLE EMG 2 EXTREMITIES: CPT | Performed by: OTHER

## 2021-01-01 PROCEDURE — 30233N1 TRANSFUSION OF NONAUTOLOGOUS RED BLOOD CELLS INTO PERIPHERAL VEIN, PERCUTANEOUS APPROACH: ICD-10-PCS | Performed by: INTERNAL MEDICINE

## 2021-01-01 PROCEDURE — 0W993ZZ DRAINAGE OF RIGHT PLEURAL CAVITY, PERCUTANEOUS APPROACH: ICD-10-PCS | Performed by: INTERNAL MEDICINE

## 2021-01-01 PROCEDURE — 71045 X-RAY EXAM CHEST 1 VIEW: CPT | Performed by: NURSE PRACTITIONER

## 2021-01-01 PROCEDURE — 73130 X-RAY EXAM OF HAND: CPT | Performed by: EMERGENCY MEDICINE

## 2021-01-01 PROCEDURE — 3008F BODY MASS INDEX DOCD: CPT | Performed by: HOSPITALIST

## 2021-01-01 PROCEDURE — 99223 1ST HOSP IP/OBS HIGH 75: CPT | Performed by: HOSPITALIST

## 2021-01-01 PROCEDURE — 3075F SYST BP GE 130 - 139MM HG: CPT | Performed by: INTERNAL MEDICINE

## 2021-01-01 PROCEDURE — 71045 X-RAY EXAM CHEST 1 VIEW: CPT | Performed by: RADIOLOGY

## 2021-01-01 PROCEDURE — 3078F DIAST BP <80 MM HG: CPT | Performed by: HOSPITALIST

## 2021-01-01 PROCEDURE — 80053 COMPREHEN METABOLIC PANEL: CPT | Performed by: INTERNAL MEDICINE

## 2021-01-01 PROCEDURE — 32555 ASPIRATE PLEURA W/ IMAGING: CPT | Performed by: INTERNAL MEDICINE

## 2021-01-01 PROCEDURE — 99232 SBSQ HOSP IP/OBS MODERATE 35: CPT | Performed by: PHYSICIAN ASSISTANT

## 2021-01-01 PROCEDURE — 99231 SBSQ HOSP IP/OBS SF/LOW 25: CPT | Performed by: NURSE PRACTITIONER

## 2021-01-01 PROCEDURE — 93880 EXTRACRANIAL BILAT STUDY: CPT | Performed by: OTHER

## 2021-01-01 PROCEDURE — 36415 COLL VENOUS BLD VENIPUNCTURE: CPT | Performed by: INTERNAL MEDICINE

## 2021-01-01 PROCEDURE — 02H633Z INSERTION OF INFUSION DEVICE INTO RIGHT ATRIUM, PERCUTANEOUS APPROACH: ICD-10-PCS | Performed by: RADIOLOGY

## 2021-01-01 PROCEDURE — 71045 X-RAY EXAM CHEST 1 VIEW: CPT | Performed by: EMERGENCY MEDICINE

## 2021-01-01 PROCEDURE — 5A1955Z RESPIRATORY VENTILATION, GREATER THAN 96 CONSECUTIVE HOURS: ICD-10-PCS | Performed by: INTERNAL MEDICINE

## 2021-01-01 PROCEDURE — 99233 SBSQ HOSP IP/OBS HIGH 50: CPT | Performed by: PHYSICIAN ASSISTANT

## 2021-01-01 PROCEDURE — 74177 CT ABD & PELVIS W/CONTRAST: CPT | Performed by: PHYSICIAN ASSISTANT

## 2021-01-01 PROCEDURE — 70496 CT ANGIOGRAPHY HEAD: CPT | Performed by: OTHER

## 2021-01-01 PROCEDURE — G0439 PPPS, SUBSEQ VISIT: HCPCS | Performed by: INTERNAL MEDICINE

## 2021-01-01 PROCEDURE — 30233S1 TRANSFUSION OF NONAUTOLOGOUS GLOBULIN INTO PERIPHERAL VEIN, PERCUTANEOUS APPROACH: ICD-10-PCS | Performed by: OTHER

## 2021-01-01 PROCEDURE — 3008F BODY MASS INDEX DOCD: CPT | Performed by: INTERNAL MEDICINE

## 2021-01-01 PROCEDURE — 6A550Z3 PHERESIS OF PLASMA, SINGLE: ICD-10-PCS | Performed by: INTERNAL MEDICINE

## 2021-01-01 PROCEDURE — 70450 CT HEAD/BRAIN W/O DYE: CPT | Performed by: OTHER

## 2021-01-01 PROCEDURE — 43235 EGD DIAGNOSTIC BRUSH WASH: CPT | Performed by: INTERNAL MEDICINE

## 2021-01-01 PROCEDURE — 80061 LIPID PANEL: CPT | Performed by: INTERNAL MEDICINE

## 2021-01-01 PROCEDURE — 99222 1ST HOSP IP/OBS MODERATE 55: CPT | Performed by: PHYSICIAN ASSISTANT

## 2021-01-01 PROCEDURE — 0DJ08ZZ INSPECTION OF UPPER INTESTINAL TRACT, VIA NATURAL OR ARTIFICIAL OPENING ENDOSCOPIC: ICD-10-PCS | Performed by: INTERNAL MEDICINE

## 2021-01-01 PROCEDURE — 85025 COMPLETE CBC W/AUTO DIFF WBC: CPT | Performed by: INTERNAL MEDICINE

## 2021-01-01 PROCEDURE — 02HV33Z INSERTION OF INFUSION DEVICE INTO SUPERIOR VENA CAVA, PERCUTANEOUS APPROACH: ICD-10-PCS | Performed by: INTERNAL MEDICINE

## 2021-01-01 PROCEDURE — 82728 ASSAY OF FERRITIN: CPT | Performed by: INTERNAL MEDICINE

## 2021-01-01 PROCEDURE — 70498 CT ANGIOGRAPHY NECK: CPT | Performed by: OTHER

## 2021-01-01 PROCEDURE — 99214 OFFICE O/P EST MOD 30 MIN: CPT | Performed by: INTERNAL MEDICINE

## 2021-01-01 PROCEDURE — 99223 1ST HOSP IP/OBS HIGH 75: CPT | Performed by: OTHER

## 2021-01-01 PROCEDURE — 99232 SBSQ HOSP IP/OBS MODERATE 35: CPT | Performed by: INTERNAL MEDICINE

## 2021-01-01 PROCEDURE — 3078F DIAST BP <80 MM HG: CPT | Performed by: INTERNAL MEDICINE

## 2021-01-01 PROCEDURE — 83540 ASSAY OF IRON: CPT | Performed by: INTERNAL MEDICINE

## 2021-01-01 PROCEDURE — 3074F SYST BP LT 130 MM HG: CPT | Performed by: HOSPITALIST

## 2021-01-01 RX ORDER — HEPARIN SODIUM 5000 [USP'U]/ML
5000 INJECTION, SOLUTION INTRAVENOUS; SUBCUTANEOUS EVERY 12 HOURS SCHEDULED
Status: DISCONTINUED | OUTPATIENT
Start: 2021-01-01 | End: 2021-01-01

## 2021-01-01 RX ORDER — SIMVASTATIN 40 MG
TABLET ORAL
Qty: 90 TABLET | Refills: 0 | Status: SHIPPED | OUTPATIENT
Start: 2021-01-01 | End: 2021-01-01

## 2021-01-01 RX ORDER — IPRATROPIUM BROMIDE AND ALBUTEROL SULFATE 2.5; .5 MG/3ML; MG/3ML
3 SOLUTION RESPIRATORY (INHALATION) 3 TIMES DAILY
Status: DISCONTINUED | OUTPATIENT
Start: 2021-01-01 | End: 2021-01-01

## 2021-01-01 RX ORDER — ONDANSETRON 2 MG/ML
4 INJECTION INTRAMUSCULAR; INTRAVENOUS EVERY 6 HOURS PRN
Status: DISCONTINUED | OUTPATIENT
Start: 2021-01-01 | End: 2021-08-07

## 2021-01-01 RX ORDER — ATORVASTATIN CALCIUM 20 MG/1
20 TABLET, FILM COATED ORAL NIGHTLY
Refills: 0 | Status: DISCONTINUED | OUTPATIENT
Start: 2021-01-01 | End: 2021-01-01

## 2021-01-01 RX ORDER — LOSARTAN POTASSIUM 25 MG/1
25 TABLET ORAL DAILY
Status: DISCONTINUED | OUTPATIENT
Start: 2021-01-01 | End: 2021-01-01

## 2021-01-01 RX ORDER — METOPROLOL TARTRATE 50 MG/1
50 TABLET, FILM COATED ORAL
Status: DISCONTINUED | OUTPATIENT
Start: 2021-01-01 | End: 2021-01-01

## 2021-01-01 RX ORDER — FUROSEMIDE 10 MG/ML
40 INJECTION INTRAMUSCULAR; INTRAVENOUS
Status: DISCONTINUED | OUTPATIENT
Start: 2021-01-01 | End: 2021-08-07

## 2021-01-01 RX ORDER — HEPARIN SODIUM 1000 [USP'U]/ML
INJECTION, SOLUTION INTRAVENOUS; SUBCUTANEOUS
Status: COMPLETED
Start: 2021-01-01 | End: 2021-01-01

## 2021-01-01 RX ORDER — SODIUM CHLORIDE 9 MG/ML
INJECTION, SOLUTION INTRAVENOUS ONCE
Status: COMPLETED | OUTPATIENT
Start: 2021-01-01 | End: 2021-01-01

## 2021-01-01 RX ORDER — METOPROLOL SUCCINATE 100 MG/1
100 TABLET, EXTENDED RELEASE ORAL
Status: DISCONTINUED | OUTPATIENT
Start: 2021-01-01 | End: 2021-01-01

## 2021-01-01 RX ORDER — ACETAMINOPHEN 325 MG/1
650 TABLET ORAL EVERY 6 HOURS PRN
Status: DISCONTINUED | OUTPATIENT
Start: 2021-01-01 | End: 2021-08-07

## 2021-01-01 RX ORDER — METHYLPREDNISOLONE SODIUM SUCCINATE 125 MG/2ML
60 INJECTION, POWDER, LYOPHILIZED, FOR SOLUTION INTRAMUSCULAR; INTRAVENOUS EVERY 12 HOURS
Status: DISCONTINUED | OUTPATIENT
Start: 2021-01-01 | End: 2021-08-07

## 2021-01-01 RX ORDER — CLOPIDOGREL BISULFATE 75 MG/1
TABLET ORAL
Qty: 90 TABLET | Refills: 0 | Status: SHIPPED | OUTPATIENT
Start: 2021-01-01 | End: 2021-01-01

## 2021-01-01 RX ORDER — ASPIRIN 325 MG
325 TABLET ORAL DAILY
Status: DISCONTINUED | OUTPATIENT
Start: 2021-01-01 | End: 2021-08-07

## 2021-01-01 RX ORDER — VANCOMYCIN/0.9 % SOD CHLORIDE 1.75 G/5
25 PLASTIC BAG, INJECTION (ML) INTRAVENOUS ONCE
Status: COMPLETED | OUTPATIENT
Start: 2021-01-01 | End: 2021-01-01

## 2021-01-01 RX ORDER — ACETAMINOPHEN 325 MG/1
650 TABLET ORAL EVERY 4 HOURS PRN
Status: DISCONTINUED | OUTPATIENT
Start: 2021-01-01 | End: 2021-08-07

## 2021-01-01 RX ORDER — ATENOLOL 25 MG/1
25 TABLET ORAL DAILY
Status: DISCONTINUED | OUTPATIENT
Start: 2021-01-01 | End: 2021-01-01

## 2021-01-01 RX ORDER — FUROSEMIDE 10 MG/ML
20 INJECTION INTRAMUSCULAR; INTRAVENOUS ONCE
Status: COMPLETED | OUTPATIENT
Start: 2021-01-01 | End: 2021-01-01

## 2021-01-01 RX ORDER — METOCLOPRAMIDE HYDROCHLORIDE 5 MG/ML
10 INJECTION INTRAMUSCULAR; INTRAVENOUS EVERY 8 HOURS PRN
Status: DISCONTINUED | OUTPATIENT
Start: 2021-01-01 | End: 2021-08-07

## 2021-01-01 RX ORDER — METOPROLOL TARTRATE 100 MG/1
100 TABLET ORAL
Status: DISCONTINUED | OUTPATIENT
Start: 2021-01-01 | End: 2021-08-07

## 2021-01-01 RX ORDER — VIT A/VIT C/VIT E/ZINC/COPPER 2148-113
1 TABLET ORAL 2 TIMES DAILY
Qty: 180 TABLET | Refills: 3 | Status: SHIPPED | OUTPATIENT
Start: 2021-01-01

## 2021-01-01 RX ORDER — ALBUMIN, HUMAN INJ 5% 5 %
4000 SOLUTION INTRAVENOUS ONCE
Status: COMPLETED | OUTPATIENT
Start: 2021-01-01 | End: 2021-01-01

## 2021-01-01 RX ORDER — SIMVASTATIN 40 MG
TABLET ORAL
Qty: 90 TABLET | Refills: 0 | Status: SHIPPED | OUTPATIENT
Start: 2021-01-01

## 2021-01-01 RX ORDER — OMEPRAZOLE 20 MG/1
CAPSULE, DELAYED RELEASE ORAL
Qty: 90 CAPSULE | Refills: 0 | Status: SHIPPED | OUTPATIENT
Start: 2021-01-01

## 2021-01-01 RX ORDER — METOPROLOL SUCCINATE 50 MG/1
50 TABLET, EXTENDED RELEASE ORAL ONCE
Status: DISCONTINUED | OUTPATIENT
Start: 2021-01-01 | End: 2021-01-01 | Stop reason: ALTCHOICE

## 2021-01-01 RX ORDER — PANTOPRAZOLE SODIUM 20 MG/1
20 TABLET, DELAYED RELEASE ORAL
Refills: 0 | Status: DISCONTINUED | OUTPATIENT
Start: 2021-01-01 | End: 2021-01-01

## 2021-01-01 RX ORDER — CLOPIDOGREL BISULFATE 75 MG/1
TABLET ORAL
Qty: 90 TABLET | Refills: 3 | OUTPATIENT
Start: 2021-01-01

## 2021-01-01 RX ORDER — ATENOLOL 25 MG/1
TABLET ORAL
Qty: 90 TABLET | Refills: 3 | OUTPATIENT
Start: 2021-01-01

## 2021-01-01 RX ORDER — IPRATROPIUM BROMIDE AND ALBUTEROL SULFATE 2.5; .5 MG/3ML; MG/3ML
3 SOLUTION RESPIRATORY (INHALATION)
Status: DISCONTINUED | OUTPATIENT
Start: 2021-01-01 | End: 2021-01-01

## 2021-01-01 RX ORDER — LIDOCAINE HYDROCHLORIDE 10 MG/ML
5 INJECTION, SOLUTION EPIDURAL; INFILTRATION; INTRACAUDAL; PERINEURAL ONCE
Status: DISCONTINUED | OUTPATIENT
Start: 2021-01-01 | End: 2021-01-01 | Stop reason: ALTCHOICE

## 2021-01-01 RX ORDER — SODIUM CHLORIDE 9 MG/ML
INJECTION, SOLUTION INTRAVENOUS CONTINUOUS
Status: ACTIVE | OUTPATIENT
Start: 2021-01-01 | End: 2021-01-01

## 2021-01-01 RX ORDER — FUROSEMIDE 10 MG/ML
40 INJECTION INTRAMUSCULAR; INTRAVENOUS
Status: DISCONTINUED | OUTPATIENT
Start: 2021-01-01 | End: 2021-01-01

## 2021-01-01 RX ORDER — FUROSEMIDE 10 MG/ML
40 INJECTION INTRAMUSCULAR; INTRAVENOUS ONCE
Status: DISCONTINUED | OUTPATIENT
Start: 2021-01-01 | End: 2021-01-01

## 2021-01-01 RX ORDER — FUROSEMIDE 10 MG/ML
40 INJECTION INTRAMUSCULAR; INTRAVENOUS 3 TIMES DAILY
Status: DISCONTINUED | OUTPATIENT
Start: 2021-01-01 | End: 2021-01-01

## 2021-01-01 RX ORDER — OMEPRAZOLE 20 MG/1
CAPSULE, DELAYED RELEASE ORAL
Qty: 90 CAPSULE | Refills: 3 | OUTPATIENT
Start: 2021-01-01

## 2021-01-01 RX ORDER — FUROSEMIDE 10 MG/ML
INJECTION INTRAMUSCULAR; INTRAVENOUS
Status: DISPENSED
Start: 2021-01-01 | End: 2021-01-01

## 2021-01-01 RX ORDER — ATENOLOL 25 MG/1
TABLET ORAL
Qty: 90 TABLET | Refills: 0 | Status: SHIPPED | OUTPATIENT
Start: 2021-01-01

## 2021-01-01 RX ORDER — PYRIDOSTIGMINE BROMIDE 60 MG/1
30 TABLET ORAL EVERY 8 HOURS SCHEDULED
Status: DISCONTINUED | OUTPATIENT
Start: 2021-01-01 | End: 2021-08-07

## 2021-01-01 RX ORDER — OMEPRAZOLE 20 MG/1
CAPSULE, DELAYED RELEASE ORAL
Qty: 90 CAPSULE | Refills: 0 | Status: SHIPPED | OUTPATIENT
Start: 2021-01-01 | End: 2021-01-01

## 2021-01-01 RX ORDER — CLOPIDOGREL BISULFATE 75 MG/1
75 TABLET ORAL DAILY
Status: DISCONTINUED | OUTPATIENT
Start: 2021-01-01 | End: 2021-01-01

## 2021-01-01 RX ORDER — LORAZEPAM 0.5 MG/1
0.5 TABLET ORAL DAILY PRN
Status: DISCONTINUED | OUTPATIENT
Start: 2021-01-01 | End: 2021-08-07

## 2021-01-01 RX ORDER — AZATHIOPRINE 50 MG/1
50 TABLET ORAL DAILY
Status: DISCONTINUED | OUTPATIENT
Start: 2021-01-01 | End: 2021-08-07

## 2021-01-01 RX ORDER — VANCOMYCIN HYDROCHLORIDE
1500
Status: COMPLETED | OUTPATIENT
Start: 2021-01-01 | End: 2021-01-01

## 2021-01-01 RX ORDER — VANCOMYCIN HYDROCHLORIDE
1500
Status: DISCONTINUED | OUTPATIENT
Start: 2021-01-01 | End: 2021-01-01

## 2021-01-01 RX ORDER — ACETAMINOPHEN 650 MG/1
650 SUPPOSITORY RECTAL EVERY 4 HOURS PRN
Status: DISCONTINUED | OUTPATIENT
Start: 2021-01-01 | End: 2021-08-07

## 2021-01-01 RX ORDER — IPRATROPIUM BROMIDE AND ALBUTEROL SULFATE 2.5; .5 MG/3ML; MG/3ML
SOLUTION RESPIRATORY (INHALATION)
Status: DISPENSED
Start: 2021-01-01 | End: 2021-01-01

## 2021-01-01 RX ORDER — METOPROLOL SUCCINATE 50 MG/1
50 TABLET, EXTENDED RELEASE ORAL
Status: DISCONTINUED | OUTPATIENT
Start: 2021-01-01 | End: 2021-01-01

## 2021-01-01 RX ORDER — IPRATROPIUM BROMIDE AND ALBUTEROL SULFATE 2.5; .5 MG/3ML; MG/3ML
3 SOLUTION RESPIRATORY (INHALATION)
Status: DISCONTINUED | OUTPATIENT
Start: 2021-01-01 | End: 2021-08-07

## 2021-01-01 RX ORDER — CETIRIZINE HYDROCHLORIDE 5 MG/1
5 TABLET ORAL DAILY
Status: DISCONTINUED | OUTPATIENT
Start: 2021-01-01 | End: 2021-08-07

## 2021-01-01 RX ORDER — LORAZEPAM 0.5 MG/1
TABLET ORAL
Qty: 30 TABLET | Refills: 0 | Status: SHIPPED | OUTPATIENT
Start: 2021-01-01 | End: 2021-01-01

## 2021-01-01 RX ORDER — DEXTROSE MONOHYDRATE 25 G/50ML
50 INJECTION, SOLUTION INTRAVENOUS
Status: DISCONTINUED | OUTPATIENT
Start: 2021-01-01 | End: 2021-08-07

## 2021-01-01 RX ORDER — METOCLOPRAMIDE HYDROCHLORIDE 5 MG/ML
5 INJECTION INTRAMUSCULAR; INTRAVENOUS EVERY 8 HOURS PRN
Status: DISCONTINUED | OUTPATIENT
Start: 2021-01-01 | End: 2021-01-01

## 2021-01-01 RX ORDER — HEPARIN SODIUM 5000 [USP'U]/ML
5000 INJECTION, SOLUTION INTRAVENOUS; SUBCUTANEOUS EVERY 12 HOURS SCHEDULED
Status: DISCONTINUED | OUTPATIENT
Start: 2021-01-01 | End: 2021-08-07

## 2021-01-01 RX ORDER — VANCOMYCIN HYDROCHLORIDE 125 MG/1
125 CAPSULE ORAL DAILY
Status: DISCONTINUED | OUTPATIENT
Start: 2021-01-01 | End: 2021-01-01

## 2021-01-01 RX ORDER — FERROUS SULFATE 300 MG/5ML
325 LIQUID (ML) ORAL DAILY
Status: DISCONTINUED | OUTPATIENT
Start: 2021-01-01 | End: 2021-08-07

## 2021-01-01 RX ORDER — CLOPIDOGREL BISULFATE 75 MG/1
TABLET ORAL
Qty: 90 TABLET | Refills: 0 | Status: SHIPPED | OUTPATIENT
Start: 2021-01-01

## 2021-01-01 RX ORDER — LORAZEPAM 0.5 MG/1
TABLET ORAL
Qty: 30 TABLET | Refills: 0 | Status: SHIPPED | OUTPATIENT
Start: 2021-01-01

## 2021-01-01 RX ORDER — HYDRALAZINE HYDROCHLORIDE 20 MG/ML
10 INJECTION INTRAMUSCULAR; INTRAVENOUS EVERY 2 HOUR PRN
Status: DISCONTINUED | OUTPATIENT
Start: 2021-01-01 | End: 2021-08-07

## 2021-01-01 RX ORDER — MELATONIN
325 DAILY
Status: DISCONTINUED | OUTPATIENT
Start: 2021-01-01 | End: 2021-01-01

## 2021-01-01 RX ORDER — FUROSEMIDE 10 MG/ML
40 INJECTION INTRAMUSCULAR; INTRAVENOUS ONCE
Status: COMPLETED | OUTPATIENT
Start: 2021-01-01 | End: 2021-01-01

## 2021-01-01 RX ORDER — SODIUM CHLORIDE 9 MG/ML
INJECTION, SOLUTION INTRAVENOUS ONCE
Status: DISCONTINUED | OUTPATIENT
Start: 2021-01-01 | End: 2021-08-07

## 2021-01-01 RX ORDER — METOCLOPRAMIDE HYDROCHLORIDE 5 MG/ML
10 INJECTION INTRAMUSCULAR; INTRAVENOUS EVERY 8 HOURS PRN
Status: DISCONTINUED | OUTPATIENT
Start: 2021-01-01 | End: 2021-01-01

## 2021-01-01 RX ORDER — ATORVASTATIN CALCIUM 40 MG/1
40 TABLET, FILM COATED ORAL NIGHTLY
Status: DISCONTINUED | OUTPATIENT
Start: 2021-01-01 | End: 2021-08-07

## 2021-01-01 RX ORDER — VANCOMYCIN/0.9 % SOD CHLORIDE 1.75 G/5
1750 PLASTIC BAG, INJECTION (ML) INTRAVENOUS
Status: DISCONTINUED | OUTPATIENT
Start: 2021-01-01 | End: 2021-01-01

## 2021-01-01 RX ORDER — SIMVASTATIN 40 MG
TABLET ORAL
Qty: 90 TABLET | Refills: 3 | OUTPATIENT
Start: 2021-01-01

## 2021-01-01 RX ORDER — LIDOCAINE HYDROCHLORIDE 20 MG/ML
INJECTION, SOLUTION EPIDURAL; INFILTRATION; INTRACAUDAL; PERINEURAL
Status: COMPLETED
Start: 2021-01-01 | End: 2021-01-01

## 2021-01-01 RX ORDER — ATENOLOL 25 MG/1
TABLET ORAL
Qty: 90 TABLET | Refills: 0 | Status: SHIPPED | OUTPATIENT
Start: 2021-01-01 | End: 2021-01-01

## 2021-01-01 RX ORDER — CETIRIZINE HYDROCHLORIDE 10 MG/1
10 TABLET ORAL DAILY
Refills: 1 | Status: DISCONTINUED | OUTPATIENT
Start: 2021-01-01 | End: 2021-01-01

## 2021-01-01 RX ORDER — FUROSEMIDE 10 MG/ML
40 INJECTION INTRAMUSCULAR; INTRAVENOUS DAILY
Status: DISCONTINUED | OUTPATIENT
Start: 2021-01-01 | End: 2021-01-01

## 2021-01-01 RX ORDER — ASPIRIN 300 MG
300 SUPPOSITORY, RECTAL RECTAL DAILY
Status: DISCONTINUED | OUTPATIENT
Start: 2021-01-01 | End: 2021-08-07

## 2021-01-01 RX ORDER — LABETALOL HYDROCHLORIDE 5 MG/ML
10 INJECTION, SOLUTION INTRAVENOUS EVERY 10 MIN PRN
Status: DISCONTINUED | OUTPATIENT
Start: 2021-01-01 | End: 2021-08-07

## 2021-01-01 RX ORDER — FUROSEMIDE 10 MG/ML
INJECTION INTRAMUSCULAR; INTRAVENOUS
Status: COMPLETED
Start: 2021-01-01 | End: 2021-01-01

## 2021-02-09 NOTE — TELEPHONE ENCOUNTER
Pt does need refills  He will call back to schedule   - prefers to wait until Spring to schedule his annual OV  Hoping to get the vaccine soon too   - pt was advised he has an order from [facility] in [place] [facility] for 1st dose

## 2021-02-09 NOTE — TELEPHONE ENCOUNTER
Noted, refills sent for 90-days.     Requested Prescriptions     Signed Prescriptions Disp Refills   • ATENOLOL 25 MG Oral Tab 90 tablet 0     Sig: TAKE 1 TABLET BY MOUTH  DAILY     Authorizing Provider: Lino Sharma     Ordering User: RONNY AGOSTO

## 2021-03-12 NOTE — TELEPHONE ENCOUNTER
To Dr. Mitzy Ford----    The above refill request is for a controlled substance. Please review pended medication order.      Last prescribed #30 on 9/25/20  Last filled : 9/30/20  LOV: 2/24/20    Per refill encounter 2/4/21, pt was advised due for appt and repor

## 2021-03-12 NOTE — TELEPHONE ENCOUNTER
The following prescription was reviewed, authorized, and electronically sent to the pharmacy.     Requested Prescriptions     Signed Prescriptions Disp Refills   • LORazepam 0.5 MG Oral Tab 30 tablet 0     Si tablet daily as needed for anxiety     Autho

## 2021-03-25 NOTE — TELEPHONE ENCOUNTER
Received 1st dose of the Pfizer vaccine on 3/5  2nd dose will be tomorrow 3/26   at [facility] in [place]    Please update pt's chart

## 2021-03-25 NOTE — TELEPHONE ENCOUNTER
Uses [facility] [address] [facility] for his eyes  Purchases at [facility] as they have the best price - but still expensive for patient  Asks if this could be prescribed and if his insurance might cover     Call back #[phone]

## 2021-04-26 NOTE — TELEPHONE ENCOUNTER
To MD:  The above refill request is for a controlled substance. Please review pended medication order. Print and sign for staff to fax to pharmacy or prescribe electronically.     Last office visit: 2/24/2020  Last time refill sent and quantity/refills:

## 2021-04-26 NOTE — TELEPHONE ENCOUNTER
Contacted the pt. He scheduled his annual [facility] for 5/17/21. He states he is completely out of medication for the last 3 days. Asking if we can send a new RX to [employer] order service for him. Pt. Can be reached at [phone].

## 2021-04-28 NOTE — TELEPHONE ENCOUNTER
Upcoming appt 5/17/21    Refill request is for a maintenance medication and has met the criteria specified in the Ambulatory Medication Refill Standing Order for eligibility, visits, laboratory, alerts and was sent to the requested pharmacy.     Requested P

## 2021-05-17 NOTE — PROGRESS NOTES
Arnoldo Reno is a 80year old male. HPI:   Patient presents with:  Physical: Pt here for medicare annual exam      Elo Alicea comes in for Medicare annual wellness physical    I did tell him that I would like him to follow-up with Dr. Jesusita Cunningham.   He saw  TABLET BY MOUTH AT  NIGHT 90 tablet 0   • loratadine 10 MG Oral Tab Take 1 tablet (10 mg total) by mouth daily. 90 tablet 1   • Omega-3 Fatty Acids (FISH OIL) 1000 MG Oral Capsule Delayed Release Take 1 capsule by mouth daily.      • aspirin 81 MG Oral Tab good BS's,  no masses,   HSM or tenderness  EXTREMITIES : no cyanosis, clubbing or edema      General Health     In the past six months, have you lost more than 10 pounds without trying?: 2 - No    Has your appetite been poor?: No    Type of Diet: Balanced it?: Correct    Recall \"Ball\": Correct    Recall \"Flag\": Correct    Recall \"Tree\": Correct      Zaheer Santizo's SCREENING SCHEDULE   Tests on this list are recommended by your physician but may not be covered, or covered at this frequency, by your in MONITORING Internal Lab or Procedure External Lab or Procedure   Annual Monitoring of Persistent     Medications (ACE/ARB, digoxin, diuretics)    Potassium  Annually Potassium (mmol/L)   Date Value   06/19/2020 4.1     POTASSIUM (P) (mmol/L)   Date Value visit was 30 minutes. I spent 10 minutes before visit preparing and reviewing old records. Greater than 50% of the visit was engaged in counseling and review of past data.     Problem list is located in electronic health record shows pacemaker, coronary a

## 2021-05-18 NOTE — TELEPHONE ENCOUNTER
Please let patient know that his labs from yesterday showed that he has developed a mild anemia. His iron count is low. I am wondering if this has something to do with his gastrointestinal system.   Possibly some mild bleeding somewhere in the intestinal

## 2021-05-18 NOTE — TELEPHONE ENCOUNTER
Spoke to pt and advised on MD message below; pt verbalized understanding. Pt reports he will call GI MD.     Pt requested lab results mailed to home. Mailed to verified address.

## 2021-07-05 PROBLEM — I63.9 ACUTE ISCHEMIC STROKE (HCC): Status: ACTIVE | Noted: 2021-01-01

## 2021-07-05 PROBLEM — I63.9 CVA (CEREBRAL VASCULAR ACCIDENT) (HCC): Status: ACTIVE | Noted: 2021-01-01

## 2021-07-05 PROBLEM — R73.9 HYPERGLYCEMIA: Status: ACTIVE | Noted: 2021-01-01

## 2021-07-05 NOTE — H&P
[facility]    PATIENT'S NAME: [address] [facility] PHYSICIAN: Erna Lopez.  Britta Thompson MD   PATIENT ACCOUNT#:   [id number]    LOCATION:  [place] [address]  MEDICAL RECORD #:   [id number]       YOB: 1932  ADMISSION DATE:       07/05 daily living. REVIEW OF SYSTEMS:  Patient reports that since yesterday he has been having difficulty with his balance with a tendency of falling to the left. He felt lightheaded, dizzy, and also noted that he had spinning sensation.   Patient denies an currently on Plavix and low-dose aspirin. We will continue these for now. Obtain urology and cardiology consult. Fall precautions. Physical therapy. Further recommendations to follow.      Dictated By Felicity Galan MD  d: 07/05/2021 17:15:29  t: 07/0

## 2021-07-05 NOTE — ED QUICK NOTES
Orders for admission, patient is aware of plan and ready to go upstairs. Any questions, please call ED MARIE Rose  at extension [phone]*.    Type of COVID test sent:pt fully vaccinated  COVID Suspicion level: Low    Titratable drug(s) infusing:  Rate:    LOC a

## 2021-07-05 NOTE — ED INITIAL ASSESSMENT (HPI)
80year old male here with EMS after fall last night was on floor pt was was walking and got dizzy,reports cardiac history, pt reports being on blood thinners

## 2021-07-06 NOTE — PHYSICAL THERAPY NOTE
Orders received, chart reviewed. Attempted to see pt for PT eval. Pt currently with MD. Will f/u in future as appropriate, schedule permitting.     Patricia Jensen, DPT  Physical Therapy  [facility] [employer] #[id number]

## 2021-07-06 NOTE — PROGRESS NOTES
[facility]    Progress Note    Christina Lockwood Patient Status:  Inpatient    1932 MRN [id number]   Location [facility] 3W/SW Attending Flo Rhodes MD   [facility] Day # 1 PCP Catie Patel MD     CC: Dizziness fall  Subjective: 325 mg Oral Daily   • atorvastatin  40 mg Oral Nightly   • Heparin Sodium (Porcine)  5,000 Units Subcutaneous 2 times per day   • atenolol  25 mg Oral Daily   • Clopidogrel Bisulfate  75 mg Oral Daily   • ferrous sulfate  325 mg Oral Daily   • cetirizine acute pulmonary process.     Dictated by (CST): Tonja Nunes MD on 7/05/2021 at 4:42 PM     Finalized by (CST): Tonja Nunes MD on 7/05/2021 at 4:43 PM          EKG 12 Lead    Result Date: 7/5/2021  ECG Report  Interpretation  ------------------

## 2021-07-06 NOTE — SLP NOTE
ADULT SWALLOWING EVALUATION    ASSESSMENT    ASSESSMENT/OVERALL IMPRESSION:    This BSE was ordered d/t stroke protocol/RN dysphagia screen. Pt on solid/thin liquid diet at home. No PMH of dysphagia at [facility]. Pt denies any past swallowing difficulty. sessions/meals for dysphagia treatment. Will ensure safe tolerance of diet and reinforce swallowing/aspiration precautions. Will monitor for new CXR results. Diet to be upgraded as tolerated. To complete VFSS if appropriate.  Family education to be complete breathy)  Respiratory Status: Unlabored (room air)  Consistencies Trialed: Thin liquids; Nectar thick liquids;Puree;Hard solid  Method of Presentation: Staff/Clinician assistance;Cup;Single sips  Patient Positioning: Upright;Midline    Oral Phase of Swallow

## 2021-07-06 NOTE — DIETARY NOTE
ADULT NUTRITION INITIAL ASSESSMENT    Pt is at moderate nutrition risk. Pt meets moderate malnutrition criteria.       CRITERIA FOR MALNUTRITION DIAGNOSIS:  Criteria for non-severe malnutrition diagnosis: chronic illness related to energy intake less than7 Preferences: Not Obtained  GASTROINTESTINAL: +BM 7/5, dysphagia and Swallow evaluation noted    MEDICATIONS: reviewed  • aspirin  300 mg Rectal Daily    Or   • aspirin  325 mg Oral Daily   • atorvastatin  40 mg Oral Nightly   • Heparin Sodium (Porcine)  5, than75% for greater than 1 month, body fat mild depletion and muscle mass mild depletion.     ESTIMATED NUTRITION NEEDS: Dosing wt: 73.42 kg  Calories: 7776-9554 calories/day (27-30 calories per kg Current wt)  Protein:  grams protein/day (1.2-1.5 gra

## 2021-07-06 NOTE — PHYSICAL THERAPY NOTE
PHYSICAL THERAPY EVALUATION - INPATIENT     Room Number: 339/339-A  Evaluation Date: 7/6/2021  Type of Evaluation: Initial   Physician Order: PT Eval and Treat    Presenting Problem: acute CVA  Reason for Therapy: Mobility Dysfunction and Discharge Planni understanding. Pt encouraged to sit upright in chair 1-2 hours as able to improve airway clearance and strength, pt agreeable. Pt left in chair, all needs in place, ALARM ON, pillow at L trunk, RN aware.     Patient's current functional deficits include bal Diverticular disease    • Diverticulosis    • Essential hypertension    • GI bleed    • High blood pressure    • High cholesterol    • History of GI bleed 3/23/2019    Documented on 6/19/18   • Hyperlipidemia    • Macular degeneration    • Macular degenera Sitting: Poor  Static Standing: Poor  Dynamic Standing: Poor -    NEUROLOGICAL FINDINGS  Cranial Nerves:  CN I- appears in tact  CN II -denies blurred vision, vision appears in tact functionally (hx R visual deficits d/t macular degeneration)  CN III - L e (side stepping, L trunk lean, B flexed knees)  Stoop/Curb Assistance: Not tested    Bed Mobility: supine to sit tx at MOD A, assist for trunk control with use of bed rail, verbal/tactile cues for sequencing    Transfers: sit to stand transfer at MOD A x 2

## 2021-07-06 NOTE — PLAN OF CARE
Problem: Patient Centered Care  Goal: Patient preferences are identified and integrated in the patient's plan of care  Description: Interventions:  - What would you like us to know as we care for you?  My wife has MS, and I can no longer take care of her, care with guidance from PT/OT  - Encourage visually impaired, hearing impaired and aphasic patients to use assistive/communication devices  Outcome: Not Progressing     Problem: Patient/Family Goals  Goal: Patient/Family Long Term Goal  Description: Dottie

## 2021-07-06 NOTE — OCCUPATIONAL THERAPY NOTE
OCCUPATIONAL THERAPY EVALUATION - INPATIENT     Room Number: 339/339-A  Evaluation Date: 7/6/2021  Type of Evaluation: Initial  Presenting Problem: CVA+    Physician Order: IP Consult to Occupational Therapy  Reason for Therapy: ADL/IADL Dysfunction and Shaan Vazquez post activity. Pt with fair coordination and speed with bilateral upper extremity during ADLs and screenings.      The patient's Approx Degree of Impairment: 56.46% has been calculated based on documentation in the [facility] '6 clicks' Inpatient Daily Activity DRUG-ELUTING STENTS, SINGLE  2005    to the LAD   • EGD     • PACEMAKER/DEFIBRILLATOR     • PRQ CARD STENT W/ANGIO 1 VSL  2005    PTCA cypher stent to the LAD   • [address] 1 ART  2001    PTCA of the RCA       HOME SITUATION  Type of Home: [employer] Transfer: Max A + CGA    Bedroom Mobility: NT    BALANCE ASSESSMENT  Static Sitting: fair  Dynamic Sitting: fair  Static Standing: poor      FUNCTIONAL ADL ASSESSMENT  Grooming: set-up in sitting   Toileting: MAx A  Lower Extremity Dressing: Max A    Monica Shi

## 2021-07-06 NOTE — PLAN OF CARE
A&Ox4. Generalized weakness. Plan to work with PT/OT today. Failed dysphagia screen. NPO until speech therapy eval. Wife, Esther Davis, lives at [facility]. Wife updated. Pt states he does not want his daughter to be called.    Problem: Patient Centered degrees preferred)  - Offer food and liquids at a slow rate  - No straws  - Encourage small bites of food and small sips of liquid  - Offer pills one at a time, crush or deliver with applesauce as needed  - Discontinue feeding and notify MD (or speech path

## 2021-07-06 NOTE — SLP NOTE
SPEECH/LANGUAGE/COGNITIVE EVALUATION - INPATIENT    Admission Date: 7/5/2021  Evaluation Date: 07/06/21    Reason for Referral: RN dysphagia screen    ASSESSMENT & PLAN   ASSESSMENT & IMPRESSION    The SLUMs was administered to d/t stroke protocol.  Pt nicky and reduced vocal intensity. Verbal output is most impaired when Pt becomes aphonic. Pt's speech intelligibilty is judged to be 70-80% with unfamiliar listener and unknown content. Per [facility] - OBEY Scale, Pt's Motor Speech Production is Level 4 of 7.  Collaborated 07/07/21    Thank you for your referral.  If you have any questions please contact   Jabari Tatum M.S. TAN/SLP  Speech-Language Pathologist  [facility]  #[phone]

## 2021-07-06 NOTE — CONSULTS
[facility] HOSP - [facility]    Cardiology Consultation  Stoney Patient Status:  Inpatient    1932 MRN [id number]   Location [facility] 3W/SW Attending Flo Rhodes MD   Hosp Day # 1 PCP Fredi Pastrana PTCA of the RCA     Family History  Brother  from MI in 52's, multiple other family members with heart disease. Social History  Lives at home alone. Does not use walker/cane. Radames Talley will be coming to live with him soon.  No tobacco, alcohol, or ill TAKE 1 TABLET BY MOUTH  DAILY  SIMVASTATIN 40 MG Oral Tab, TAKE 1 TABLET BY MOUTH AT  NIGHT  Multiple Vitamins-Minerals (PRESERVISION AREDS) Oral Tab, Take 1 tablet by mouth 2 (two) times daily.  (Patient not taking: Reported on 5/17/2021 )  loratadine 10 M 1000 --    Total Intake -- 1701.3 --       Output    Stool  --  --  --    Stool Count Calculated for I/O -- 1 x --    Total Output -- -- --       Net I/O     -- 1701.3 --        Scheduled Meds:   • aspirin  300 mg Rectal Daily    Or   • aspirin  325 mg Ora GFRAA 90 105   GFRNAA 78 91   CA 8.7 7.5*    137   K 4.1 3.7    107   CO2 22.0 22.0     Recent Labs   Lab 07/05/21  1459 07/06/21  0415   RBC 4.76 4.46   HGB 13.7 13.0   HCT 42.0 38.9*   MCV 88.2 87.2   MCH 28.8 29.1   MCHC 32.6 33.4   RDW 14 atrium: The atrium was moderately to severely dilated. 4. Right atrium: The atrium was severely dilated. 5. Pulmonary arteries: Systolic pressure was within the normal range. No previous study was available for comparison.      Impression:   Chronic a

## 2021-07-06 NOTE — CONSULTS
[facility]    Report of Consultation    Erika Sanchez Patient Status:  Inpatient    1932 MRN [id number]   Location [facility] 3W/SW Attending Jarrell Peabody, MD   Hosp Day # 1 PCP Nestor Goodpasture, MD     Date of Admission:   • CARDIAC PACEMAKER PLACEMENT     • CATH DRUG ELUTING STENT     • COLONOSCOPY     • DRUG-ELUTING STENTS, SINGLE  2005    to the LAD   • EGD     • PACEMAKER/DEFIBRILLATOR     • PRQ CARD STENT W/ANGIO 1 VSL  2005    PTCA cypher stent to the LAD   • PRQ CAR daily as needed for anxiety  CLOPIDOGREL BISULFATE 75 MG Oral Tab, TAKE 1 TABLET BY MOUTH  DAILY  ATENOLOL 25 MG Oral Tab, TAKE 1 TABLET BY MOUTH  DAILY  SIMVASTATIN 40 MG Oral Tab, TAKE 1 TABLET BY MOUTH AT  NIGHT  Multiple Vitamins-Minerals (PRESERVISION specifically. VIII. Hearing intact. IX. Pallet elevates symmetrically. XI. Shoulder shrug is intact  XII. Tongue is midline    Coarse voice.     Neck flexion was 4 out of 5, neck extension 4+ out of 5    Motor Exam:  Muscle tone normal  No atrophy or fas chronic microangiopathic ischemic changes with large vessel calcific atherosclerosis.     Dictated by (CST): Alex Muñoz MD on 7/05/2021 at 4:58 PM     Finalized by (CST): Alex Muñoz MD on 7/05/2021 at 4:59 PM          XR CHEST AP PORTABLE  (

## 2021-07-07 PROBLEM — E46 MALNUTRITION (HCC): Status: ACTIVE | Noted: 2021-01-01

## 2021-07-07 NOTE — CM/SW NOTE
MDO to MARINA for [facility] services. Pt admitted 7/5/2021 for fall, dizziness, acute CVA. Per chart review, pt resides alone in a ranch style home with a ramped entrance. Pt reports that his grandson is moving in with him this week.   Prior to admission pt states

## 2021-07-07 NOTE — OCCUPATIONAL THERAPY NOTE
Communication Note to Team    Pt with RRT overnight d/t resp distress, placed on NRB, transferred to critical care. Pt currently on 13L HFNC. Not appropriate for acute therapies this date d/t resp instability.     OT to hold & re-attempt as able when medica

## 2021-07-07 NOTE — PROGRESS NOTES
[facility]    Progress Note    [facility] Patient Status:  Inpatient    1932 MRN [id number]   Location [facility] 3W/SW Attending Linda Draper MD   [facility] Day # 2 PCP Dipak Hill MD     CC: Dizziness fall  Subjective: mg Oral Daily   • atorvastatin  40 mg Oral Nightly   • Heparin Sodium (Porcine)  5,000 Units Subcutaneous 2 times per day   • atenolol  25 mg Oral Daily   • Clopidogrel Bisulfate  75 mg Oral Daily   • ferrous sulfate  325 mg Oral Daily   • cetirizine  10 m US CAROTID DOPPLER BILAT - DIAG IMG (CVP=[id number])    Result Date: 7/6/2021  CONCLUSION:  1. No hemodynamically significant stenosis in the left or right carotids. 2. Normal antegrade flow in the vertebral arteries.    Dictated by (CST): Thelma Fishman MD on Dr. Maribel Camarena d/w pt he tells me his copay is too high and he doesn't want to see Melody Masters however when wife George Tapia came in she d/w Opal Mitchell and now he tells me he wants to stick to his cardiologist   - however early this morning he fired Dr. Maribel Camarena again- I disc

## 2021-07-07 NOTE — CM/SW NOTE
DISCHARGE RECOMMENDATIONS  PT / OT Discharge Recommendations: Acute rehabilitation    PMR consult ordered and pending. Will need choice and insurance Auth. SW/CM to remain available for support and/or discharge planning.          Yolanda Fernandez RN EndoStim

## 2021-07-07 NOTE — RESPIRATORY THERAPY NOTE
Patient andrea on 13L HF NC 85%. Patient switched to Vapotherm @ 40L flow and 100% FIO2. Saturation around 88%-89%. No significant improvement in breathing. Using accessory muscles and seems having more difficulty breathing. Will switch to BIPAP.

## 2021-07-07 NOTE — PHYSICAL THERAPY NOTE
Chart reviewed, pt with RRT overnight d/t respiratory distress. Pt placed on non-breather overnight. Currently on 13L O2 HF and transferred to PCU. Not appropriate for mobility at this time d/t unstable respiratory status.  Will HOLD PT today and f/u tomorr

## 2021-07-07 NOTE — PLAN OF CARE
[place] is alert and orientated x4. Wife at bedside. Suction at bedside. Patient uses urinal and is up with a two stand pivot to the chair. Neuro checks performed per orders. RRT called overnight due to change in respiratory status.  Per Dr. Aleisha Sanchez, stop IV and self care with guidance from PT/OT  - Encourage visually impaired, hearing impaired and aphasic patients to use assistive/communication devices  Outcome: Progressing     Problem: Impaired Swallowing  Goal: Minimize aspiration risk  Description: Eugena Constable

## 2021-07-07 NOTE — PAYOR COMM NOTE
--------------  ADMISSION REVIEW     Payor: [address] #:  [id number]  Authorization Number: [id number]    Admit date: 7/5/21  Admit time:  7:31 PM     ADMISSION DATE:       07/05/2021    HISTORY AND PHYSICAL EXAMINATION    CHIEF lives by himself since his wife has been in a nursing facility for over a year. Other 12-point review of systems is negative. PHYSICAL EXAMINATION:    GENERAL:  Alert and oriented to time, place and person.   Voice hoarseness which started a few days hand is swollen and bruised after his fall  No CP no SOB         Review of Systems:   10 point ROS completed and was negative, except for pertinent positive and negatives stated in subjective.     Objective:        07/06/21  0209 07/06/21  0403 07/06/21  0 at 5:49 PM           CT BRAIN OR HEAD (88077)     Result Date: 7/5/2021  CONCLUSION:  1. No acute intracranial process. 2. Mild generalized atrophy and mild chronic microangiopathic ischemic changes with large vessel calcific atherosclerosis.     Dictated b atenolol.      Hyperlipidemia  - statin      Other medical problems  Diverticulosis   H/o GI bleed 2018   Macular degneration   BPH     Quality:  · Diet: per ST   · PT/OT: acute rehab   · DVT Prophylaxis: heparin         7/7/21  PULMONOLOGY CONSULT  Consul auscultation and percussion. Cardiac irregular rate and rhythm no murmur. Abdomen nontender, without hepatosplenomegaly and no mass appreciable. Extremities without clubbing cyanosis nor edema. Neurologic 2 out of 5 strength diffusely.   Skin without atorvastatin (LIPITOR) tab 40 mg     Date Action Dose Route User    7/6/2021 2013 Given 40 mg Oral Gal Araya RN      Clopidogrel Bisulfate (PLAVIX) tab 75 mg     Date Action Dose Route User    7/7/2021 0909 Given 75 mg Oral Denis Flores RN

## 2021-07-07 NOTE — SLP NOTE
SPEECH DAILY NOTE - INPATIENT    ASSESSMENT & PLAN   ASSESSMENT  PPE: DROPLET MASK AND GLOVES. HAND SANITIZED UPON ENTRANCE/EXIT.      Dysphagia service: Pt with increased oxygen/respiratory needs, now transferred to PCU for close monitoring due to concern Recommendations/Plan: Undetermined    Treatment Plan  Treatment Plan/Recommendations: Videofluoroscopic swallow study;Aspiration precautions; Dysphagia therapy;SLP to reassess    Interdisciplinary Communication: Discussed with RN  Plan posted at bedside

## 2021-07-07 NOTE — RESPIRATORY THERAPY NOTE
RESPIRATORY THERAPY NOTE    BEDSIDE NIF AND VC    Did both test 4x to get the average results    NIF between -36 to -38     VC   1.23L to 1.25L  ( 30% of Patient Predicted  values )

## 2021-07-07 NOTE — PROGRESS NOTES
Pt admitted with stroke, RRT called due to change in respiratory status.   No family present, no opportunity to interact with pt.    07/07/21 0115   Clinical Encounter Type   Visited With Patient not available

## 2021-07-07 NOTE — PROGRESS NOTES
Orders to transfer to CCU room 212. Pt is alert and oriented x4, on 13L HFNC satting 91%. Nursing report given to Royal Garsia. Pt's wife Kyla Hammans updated.

## 2021-07-07 NOTE — CONSULTS
[facility] HOSP - [facility]    Consult Note    Date:  7/7/2021  Date of Admission:  7/5/2021      Chief Complaint:   Cha Upton is a(n) 80year old male with increasing oxygen requirements.     HPI:   The patient was admitted to the hospital with possible • EGD     • PACEMAKER/DEFIBRILLATOR     • PRQ CARD STENT W/ANGIO 1 VSL      PTCA cypher stent to the LAD   • PRQ CARDIAC ANGIOPLAST 1 ART      PTCA of the RCA     Family history: Mother and father  with heart disease    Social History:  Hafsa Moise (36.3 °C), temperature source Oral, resp. rate 16, height 5' 10\" (1.778 m), weight 160 lb 7 oz (72.8 kg), SpO2 (!) 88 %.      Lethargic white male with left ptosis  HEENT examination is unremarkable with pupils equal round and reactive to light and accommo immunoglobulin for now    4. Cardiac issues–atrial fibrillation, status post ICD.     5.  CODE STATUS–full at present and patient reiterated that he would want to be intubated if necessary on a temporary basis but he does not want long-term mechanical vent

## 2021-07-07 NOTE — PROGRESS NOTES
[facility] - [facility]  Cardiology Progress Note    Nataly Aguiar Patient Status:  Inpatient    1932 MRN [id number]   Location [facility] 2W/SW Attending Chiqui Ross MD   Hosp Day # 2 PCP MD Fermin Allan Or   • aspirin  325 mg Oral Daily   • atorvastatin  40 mg Oral Nightly   • Heparin Sodium (Porcine)  5,000 Units Subcutaneous 2 times per day   • atenolol  25 mg Oral Daily   • Clopidogrel Bisulfate  75 mg Oral Daily   • ferrous sulfate  325 mg Oral Elizabeth Lawson basal thumb/thumb joints. Dictated by (CST): Amarilys Gonsalez MD on 7/05/2021 at 5:47 PM     Finalized by (CST): Amarilys Gonsalez MD on 7/05/2021 at 5:49 PM          CT BRAIN OR HEAD (99876)    Result Date: 7/5/2021  CONCLUSION:  1.  No acute intracr (CBI=33632)    Result Date: 7/6/2021                    [facility]* -------------------------------------------------------------------      Transthoracic Echocardiography M-mode, complete 2D, complete spectral Doppler, color Doppler, a echocardiography. The study was technically limited due to body habitus. Scanning was performed from the parasternal, apical, and subcostal acoustic windows. Intravenous contrast (Definity) was administered.   Study completion:  The patient tolerated the pr regurgitation. Tricuspid valve:   Structurally normal valve. Mobility was not restricted. Doppler: There was no evidence for stenosis. Mild regurgitation. Pulmonary artery:   Normal pulmonary artery pressure.  Right atrium:  The atrium was moderatel Stroke index (SV/bsa),         25    ml/m^2 ---------- -----------  LVOT DP   Aorta                          Value        02/17/2018 Reference  Aortic root ID                 3.8   cm     ---------- &lt;4.1  Ascending aorta ID,            3.7   cm     --- and rhythm. S1, S2 normal. No murmur, pericardial rub, S3, or extra cardiac sounds. Lungs: Clear without wheezes, rales, rhonchi or dullness. Normal excursions and effort. Abdomen: Soft, non-tender. No organosplenomegally, mass or rebound, BS-present.

## 2021-07-07 NOTE — SIGNIFICANT EVENT
ICU nocturnist.    RRT called to room 339. Patient lying in bed complaining of feeling weak and short of breath. Currently with frothy sputum, saturating in the low 80s on a nonrebreather.   Denies any chest pain or palpitations not diaphoretic at this

## 2021-07-07 NOTE — PROGRESS NOTES
[facility]  Neurology Progress Note    Yani Santamaria Patient Status:  Inpatient    1932 MRN [id number]   Location [facility] 3W/SW Attending Breanne Young MD   Hosp Day # 2 PCP Ericka Lazo MD     Subjective:  Yani Santamaria is a(n) 300 mg, Rectal, Daily   Or  aspirin tab 325 mg, 325 mg, Oral, Daily  atorvastatin (LIPITOR) tab 40 mg, 40 mg, Oral, Nightly  Heparin Sodium (Porcine) 5000 UNIT/ML injection 5,000 Units, 5,000 Units, Subcutaneous, 2 times per day  acetaminophen (TYLENOL) ta intact  VII. Face shoulder symmetry with left ptosis on the left side specifically. VIII. Hearing intact. IX. Pallet elevates symmetrically. XI. Shoulder shrug is intact  XII.  Tongue is midline     Coarse voice.     Neck flexion was 4 out of 5, neck ext process. 2. Mild generalized atrophy and mild chronic microangiopathic ischemic changes with large vessel calcific atherosclerosis.     Dictated by (CST): Julianne Menjivar MD on 7/05/2021 at 4:58 PM     Finalized by (CST): Julianne Menjivar MD on 7/05/20 Slurring of speech, difficulty with respirations and ptosis there could be some concern for neuromuscular disease such as myasthenia gravis and therefore patient will be transferred today PCU for close monitoring.   Twice a day respiratory functions will be

## 2021-07-07 NOTE — CM/SW NOTE
[facility] assisted staff with finding transportation home for patient's wife Rene Portillo. Hang Age will be going in wheelchair van/medicar back to [facility] room 327 via [facility]. I spoke with Jai Keenan [phone] at [facility].  I also

## 2021-07-08 NOTE — PROGRESS NOTES
[facility] Pharmacy Note:  Renal Dose Adjustment for Metoclopramide (REGLAN)    Raphael Griffin has been prescribed Metoclopramide (REGLAN) 10 mg every 8 hours as needed for nausea/vomiting,.     Estimated Creatinine Clearance: 19.3 mL/min (A) (based on SCr of 2.73

## 2021-07-08 NOTE — RESPIRATORY THERAPY NOTE
RESPIRATORY NOTE:  NIF AND VC    NIF: - 20    VC: 3.6L ( patient unable to follow instructions correctly and appears very lethargic.)

## 2021-07-08 NOTE — PLAN OF CARE
Patient admitted to room 212  Patient is [x]Awake [x]Alert []Drowsy   Oriented to [x]Person [x]Place [x]Time and []Situation.   Oriented patient to room, room service, and hospital personnel colors-blue nurses, wine PCT's, green Respiratory therapist and to

## 2021-07-08 NOTE — PHYSICAL THERAPY NOTE
Pt was to be seen for PT session. Consulted w/ RN. Pt is not appropriate to work with therapy. Pt is on bipap w/ declining respiratory status w/ possible intubation. Will re-attempt tomorrow if appropriate to see pt.

## 2021-07-08 NOTE — PROGRESS NOTES
[facility]  Cardiology Progress Note    Yani Santamaria Patient Status:  Inpatient    1932 MRN [id number]   Location [facility] 2W/SW Attending Breanne Young MD   Hosp Day # 3 PCP Ericka Lazo MD       Subjective      Cu --    P.O. 0 0 --    I.V.  1135  524  --    I.V. 10 524 --    Volume (mL) (0.9% NaCl infusion) 1125 -- --    Total Intake 1135 524 --       Output    Urine  300  300  --    Urine 300 -- --    Incontinent Urine Occurrence 1 x 1 x --    Output (mL) (External RDW 14.8 15.4* 15.7*   NEPRELIM 15.62* 11.23* 9.20*   WBC 17.0* 12.2* 10.1   .0 215.0 177.0       No results for input(s): BNPML in the last 168 hours.     Recent Labs   Lab 07/05/21  1459 07/07/21  0111   TROP <0.045 <0.045   CK 37*  --        XR consolidation right base and left mid lung field. 3. Pacemaker. 4. A preliminary report was submitted and there are no major discrepancies. Dictated by (CST): Mercedes Dinero MD on 7/07/2021 at 8:16 AM     Finalized by (CST):  Mercedes Dinero MD on 7 technically    sufficient to allow evaluation of LV diastolic function. 2. Regional wall motion abnormality: Akinesis of the apical    myocardium; severe hypokinesis of the mid anteroseptal, mid    inferoseptal, and mid-apical inferior myocardium.  3. Left Doppler: There was no stenosis. No regurgitation. Aorta: Aortic root: The aortic root was normal in size. Mitral valve:   Normal thickened leaflets. Mobility was not restricted. Doppler: There was no evidence for stenosis. No regurgitation. 5.65  cm/sec 6.82       -----------  LV E/e', medial                13.4         4.5        -----------  LV e', average                 7.3   cm/sec ---------- -----------  LV E/e', average               10           ---------- -----------   Jeanmarie Simmons 8     mm Hg  3          -----------   Right ventricle                Value        02/17/2018 Reference  RV ID, ED, PLAX                3.2   cm     ---------- -----------  RV pressure, S, DP             28    mm Hg  ---------- ----------- Legend: (L) Further management per primary     CAD s/p PCI  - Continue ASA, plavix, statin     Thank you for allowing me to participate in the care of your patient.     Chika Hurst MD  [address] [place] [address]  7/8/2021

## 2021-07-08 NOTE — OCCUPATIONAL THERAPY NOTE
Per RN, patient is not appropriate to participate in therapy on this date. Patient is on Bipap with decreasing respiratory status and possible intubation. Will follow up as medically appropriate.     Myron De Leon OTR/L  [facility]  [id number]

## 2021-07-08 NOTE — PROGRESS NOTES
[facility] Pharmacy Note:  Renal Dose Adjustment for Cetirizine (ZYRTEC)    You Summers has been prescribed Cetirizine (Zyrtec) 10 mg orally daily. Estimated Creatinine Clearance: 19.3 mL/min (A) (based on SCr of 2.73 mg/dL (H)).     The dose has been changed

## 2021-07-08 NOTE — CONSULTS
[facility] - [facility]    Report of Consultation    Paul Vin Patient Status:  Inpatient    1932 MRN [id number]   Location [facility] 2W/SW Attending Phil Barfield MD   Hosp Day # 3 PCP Heide Lyn MD     Date of Admission:   Smoking status: Former Smoker        Quit date:         Years since quittin.5      Smokeless tobacco: Never Used    Vaping Use      Vaping Use: Never used    Alcohol use: No      Alcohol/week: 0.0 standard drinks    Drug use: No      Current Medic 40 MG Oral Tab, TAKE 1 TABLET BY MOUTH AT  NIGHT  Multiple Vitamins-Minerals (PRESERVISION AREDS) Oral Tab, Take 1 tablet by mouth 2 (two) times daily.  (Patient not taking: Reported on 5/17/2021 )  loratadine 10 MG Oral Tab, Take 1 tablet (10 mg total) by 55* 20*   CA 7.5* 7.9* 8.0*    140 139   K 3.7 4.0 4.7    109 108   CO2 22.0 21.0 22.0        Imaging:  XR CHEST AP PORTABLE  (CPT=71045)    Result Date: 7/8/2021  CONCLUSION:  1.  Unfavorable change with probable bronchus intermedius obstructio

## 2021-07-08 NOTE — PROGRESS NOTES
[facility] AND [facility]  Neurology Progress Note    Darlene Archuleta Patient Status:  Inpatient    1932 MRN [id number]   Location [facility] 3W/SW Attending Bety Loredo MD   [facility] Day # 3 PCP Cyndi Flaherty MD     Subjective:  Darlene Archuleta is a(n) mg, Intravenous, BID (Diuretic)  Piperacillin Sod-Tazobactam So (ZOSYN) 3.375 g in dextrose 5% 100 mL IVPB-ADDV, 3.375 g, Intravenous, Q8H  metoprolol succinate (Toprol XL) 24 hr tab 50 mg, 50 mg, Oral, Daily Beta Blocker  Losartan Potassium (COZAAR) tab 2 normal glutition  Neck- No masses or adenopathy  Cv: pulses were palpable and normal, no cyanosis or edema      Neurological:      Mental Status- Alert difficult to assess fully due to the patient having respiratory distress as well as being on the BiPAP c preliminary report was submitted and there are no major discrepancies. Dictated by (CST): Jeff Childs MD on 7/07/2021 at 8:16 AM     Finalized by (CST):  Jeff Childs MD on 7/07/2021 at 8:18 AM                  Assessment:  Patient Active Prob

## 2021-07-08 NOTE — PROGRESS NOTES
[facility]    Progress Note      Assessment and Plan:     1.   Respiratory failure–strong suspicion is that the patient has aspiration pneumonitis and insidiously progressive neurologic syndrome possibly myasthenia gravis as he has new ptosi aroused to verbal response    Objective:   Blood pressure 130/70, pulse 70, temperature 97 °F (36.1 °C), temperature source Temporal, resp. rate 22, height 5' 10\" (1.778 m), weight 160 lb 7 oz (72.8 kg), SpO2 91 %.     Physical Exam lethargic white male  H

## 2021-07-08 NOTE — DIETARY NOTE
ADULT NUTRITION UPDATE NOTE    Pt is at high nutrition risk. Pt meets moderate malnutrition criteria.       CRITERIA FOR MALNUTRITION DIAGNOSIS:  Criteria for non-severe malnutrition diagnosis: chronic illness related to energy intake less than75% for grea Meeting Needs: NPO  Tube feeds to start and advance to goal.   Percent Meals Eaten (last 3 days)     None         Food Allergies: No Known Food Allergies (NKFA)  Cultural/Ethnic/Alevism Preferences: Not Obtained  GASTROINTESTINAL: +BM 7/5 soft black smea kg/m2 - WNL  IBW: 166 lbs        97% IBW  Usual Body Wt: 175-180 lbs       90% UBW    WEIGHT HISTORY:  Patient Weight(s) for the past 336 hrs:   Weight   07/07/21 0500 72.8 kg (160 lb 7 oz)   07/06/21 0403 73.4 kg (161 lb 14.4 oz)   07/05/21 1932 72.5 kg ( Coordination of nutrition care: collaboration with other providers  - Discharge and transfer of nutrition care to new setting or provider: to be determined    MONITOR AND EVALUATE/NUTRITION GOALS:  - Food and Nutrient Intake:      Monitor: for PO initiatio

## 2021-07-08 NOTE — PROGRESS NOTES
[facility] Pharmacy Note:  Renal Adjustment for piperacillin/tazobactam (Orly Regalado)    Neeta Child is a 80year old patient who has been prescribed piperacillin/tazobactam (ZOSYN) 3.375g every 8 hrs.   The estimated creatinine clearance is 19.3 mL/min (A) (based on

## 2021-07-08 NOTE — RESPIRATORY THERAPY NOTE
RESPIRATORY THERAPY PROGRESS NOTE    Bedside NIF and VC    Patient awake and alert. Took Patient off BIPAP to do test. Able to follow commands and instructions.     NIF between -22 to -26    VC between 1.52L to 1.68 L ( 38% of Patient predicted values )

## 2021-07-08 NOTE — PROGRESS NOTES
[facility] - [facility]    Progress Note    Clover Edmond Patient Status:  Inpatient    1932 MRN [id number]   Location [facility] 3W/SW Attending Destiny Herrera MD   Hosp Day # 3 PCP Tammy Castleman, MD     CC: Dizziness fall  Subjective: sulfate  325 mg Oral Daily   • Pantoprazole Sodium  20 mg Oral QAM AC       Current PRN Inpatient Meds:      Metoclopramide HCl, acetaminophen **OR** acetaminophen, Labetalol HCl, hydrALAzine HCl, acetaminophen, ondansetron HCl, LORazepam    Results:     R discrepancies. Dictated by (CST): Cami Lozano MD on 7/07/2021 at 8:16 AM     Finalized by (CST):  Cami Lozano MD on 7/07/2021 at 8:18 AM              Assessment and Plan:     Acute CVA, now less likely  now concern for myasthenia crisis  [place] reviewed. Discussed with APN. Acute respiratory failure  - Pulm on board  - secondary to aspiration pna and Myasthenia   - on BIPAP  - may need to be intubated  - discussed with Neurology and Pulmonology  - dobhoff in    >35 min spent with patient.  > 50

## 2021-07-08 NOTE — SLP NOTE
SLP attempt to schedule VFSS this AM. Per RN pt remains on Bipap. SLP to complete VFSS as pt's respiratory status improves. RN v/u to POC. Please contact SLP with any questions, concerns, and/or change in status. Thank you. Hellen Archer

## 2021-07-09 NOTE — RESPIRATORY THERAPY NOTE
Pt on continuous BIPAP with the following settings: S/T 12/8/100%, tolerating well, saturating in low 90's. No changes at this time, RT will continue to monitor.

## 2021-07-09 NOTE — PROGRESS NOTES
[facility]  Cardiology Progress Note    [facility] Patient Status:  Inpatient    1932 MRN [id number]   Location [facility] 2W/SW Attending Anahi Massey MD   [facility] Day # 4 PCP Dipak Hill MD       Subjective      Re NG Right nare) -- 0 --    Total Output 300 3300 --       Net I/O     224 -2482.5 --           Vent Settings: FiO2 (%):  [75 %-90 %] 90 %    Scheduled Meds:   • piperacillin-tazobactam  3.375 g Intravenous Q12H   • cetirizine  5 mg Oral Daily   • ipratropiu for input(s): BNPML in the last 168 hours. Recent Labs   Lab 07/05/21  1459 07/07/21  0111   TROP <0.045 <0.045   CK 37*  --        US CAROTID DOPPLER BILAT - DIAG IMG (CPT=93880)    Result Date: 7/6/2021  CONCLUSION:  1.  No hemodynamically significant intermedius obstruction with right middle and right lower lobe collapse. 2. Stable cardiomegaly. 3. Pacemaker. 4. The service will be notified. Dictated by (CST): Landry Reed MD on 7/08/2021 at 9:35 AM     Finalized by (CST):  Landry Reed MD 20-25%, by    visual assessment. Mild diffuse hypokinesis with distinct    regional wall motion abnormalities. The study is not technically    sufficient to allow evaluation of LV diastolic function.  2. Regional wall motion abnormality: Akinesis of the api to allow evaluation of LV diastolic function. Aortic valve:   Structurally normal valve. Trileaflet; normal thickened leaflets. Doppler: There was no stenosis. No regurgitation. Aorta: Aortic root: The aortic root was normal in size.  Mitral valve: 8.97  cm/sec 4.29       -----------  LV E/e', lateral               8.4          7.2        -----------  LV e', medial                  5.65  cm/sec 6.82       -----------  LV E/e', medial                13.4         4.5        -----------  LV e', averlubna 44.9  ml/m^2 ---------- 11.0 - 39.0   Systemic veins                 Value        02/17/2018 Reference  Estimated CVP                  8     mm Hg  3          -----------   Right ventricle                Value        02/17/2018 Reference  RV ID, ED, PLAX discussions, patient is not interested in [address] given prior history of GIB     MARINA  - Cr 1.2 -> 2.3, no evidence of fluid overload on exam  - Hold ACEi/ARB  - Further management per primary, creatinine now improving     CAD s/p PCI  - Continue ASA, plavix,

## 2021-07-09 NOTE — PHYSICAL THERAPY NOTE
Attempted to see pt for follow-up PT treatment however pt was just intubated and is not medically stable at this time. Pt will need new PT orders when medically appropriate to participate.      Alicia Majano, [address] [facility]

## 2021-07-09 NOTE — ANESTHESIA PROCEDURE NOTES
Airway  Date/Time: 7/9/2021 10:25 AM  Urgency: emergent    Airway not difficult    General Information and Staff    Patient location during procedure: ICU  Anesthesiologist: Jonnie Ng MD  Performed: anesthesiologist     Consent for Airway (if performed

## 2021-07-09 NOTE — SLP NOTE
Per RN and respiratory therapist, patient now intubated. Pt to be placed \"ON HOLD\" and new swallow evaluation orders to be obtained as patient s/p extubation.  VFSS recommended prior to the initiation of an oral diet as patient medically appropriate/stabl

## 2021-07-09 NOTE — SLP NOTE
SLP f/u to schedule VFSS. Pt remains on BiPAP support and not appropriate for oral intake at this time. SLP to schedule VFSS as patient's overall respiratory status improves and patient able to safely travel to radiology suite. RN, Diomedes Begrer, alerted to [address] [place] [address].

## 2021-07-09 NOTE — PROGRESS NOTES
[facility]    Progress Note    Ras Jones Patient Status:  Inpatient    1932 MRN [id number]   Location [facility] 2W/SW Attending Debora Leija MD   [facility] Day # 4 PCP Negin Bower MD       Subjective:   Ras Robert is (CPT=71045)    Result Date: 7/8/2021  CONCLUSION:  1. The feeding tube has been repositioned, and now curls back upon itself slightly in the fundus of the stomach. It does not appear to be kinked. 2. Worsening left base consolidation/atelectasis.   Follow-

## 2021-07-09 NOTE — PLAN OF CARE
Pt alert, nods appropriately, very lethargic and weak. Pt on continuous bipap. Unlabored, diminished breath sounds. Pt weak to talk but able to open eyes and nods. Able to move BUE, requires assist with repositioning. Skin dry and intact.  [place]ob[place]uff tube lr year  - Provide timely, complete, and accurate information to patient/family  - Incorporate patient and family knowledge, values, beliefs, and cultural backgrounds into the planning and delivery of care  - Encourage patient/family to participate in care an

## 2021-07-09 NOTE — PROGRESS NOTES
[facility] AND [facility]  Neurology Progress Note    Shyanne Francois Patient Status:  Inpatient    1932 MRN [id number]   Location [facility] 3W/SW Attending Shaista Jovel MD   [facility] Day # 4 PCP Angelica Espino MD     Subjective:  Shyanne Francois is a(n) QID  dextrose 10 % infusion, , Intravenous, Continuous PRN  immune globulin (GAMMAGARD) 10 % infusion 30 g, 30 g, Intravenous, Daily  metoprolol succinate (Toprol XL) 24 hr tab 50 mg, 50 mg, Oral, Daily Beta Blocker  acetaminophen (TYLENOL) tab 650 mg, 650 Status-much more lethargic currently. Relatively limited exam.  Try to open eyes to sternal rub only.   Pupil reflex were normal                        Lab Results   Component Value Date    WBC 8.8 07/09/2021    HGB 12.7 (L) 07/09/2021    HCT 40.8 07/09/ at approximately 1501 hours. Dictated by (CST): Debra Cedillo MD on 7/08/2021 at 2:59 PM     Finalized by (CST): Debra Cedillo MD on 7/08/2021 at 3:01 PM          XR CHEST AP PORTABLE  (CPT=71045)    Result Date: 7/8/2021  CONCLUSION:  1.  Unfavorable Ozzy Hernández

## 2021-07-09 NOTE — ED PROVIDER NOTES
Patient Seen in: [facility] 2w/sw      History   Patient presents with:  Fall  Hip Pain    Stated Complaint: fall, hip pain    HPI/Subjective:   HPI    Patient presents to the emergency department after falling several times yesterday and today. ED Triage Vitals [07/05/21 1459]   BP (!) 189/86   Pulse 70   Resp 20   Temp 97.5 °F (36.4 °C)   Temp src Oral   SpO2 97 %   O2 Device None (Room air)       Current:/70 (BP Location: Right arm)   Pulse 70   Temp 97 °F (36.1 °C) (Temporal)   Resp (8) - Abnormal; Notable for the following components:    Glucose 134 (*)     All other components within normal limits   CK CREATINE KINASE (NOT CREATININE) - Abnormal; Notable for the following components:    CK 37 (*)     All other components within norm components:    Clarity Urine Cloudy (*)     Ketones Urine Trace (*)     Blood Urine Large (*)     Protein Urine 30  (*)     Leukocyte Esterase Urine Moderate (*)     WBC Urine >50 (*)     RBC Urine >10 (*)     Bacteria Urine 2+ (*)     Squamous Epi.  Cells normal limits   POCT GLUCOSE - Abnormal; Notable for the following components:    POC Glucose  137 (*)     All other components within normal limits   CBC W/ DIFFERENTIAL - Abnormal; Notable for the following components:    RDW-SD 47.8 (*)     Lymphocyte A -----------         ------                     CBC W/ DIFFERENTIAL[[id number]]          Abnormal            Final result                 Please view results for these tests on the individual orders.    MYASTHENIA GRAVIS REFLEX PANEL   CBC WITH DIFFEREN opacification/atelectasis. Dictated by (CST): Chano Kumar MD on 7/08/2021 at 6:37 PM     Finalized by (CST): Chano Kumar MD on 7/08/2021 at 6:38 PM          XR CHEST AP PORTABLE  (CPT=71045)    Result Date: 7/8/2021  CONCLUSION:  1.  The feeding t pm    Follow-up:  No follow-up provider specified. We recommend that you schedule follow up care with a primary care provider within the next three months to obtain basic health screening including reassessment of your blood pressure.       Medications Pre

## 2021-07-09 NOTE — PLAN OF CARE
Problem: Patient Centered Care  Goal: Patient preferences are identified and integrated in the patient's plan of care  Description: Interventions:  - What would you like us to know as we care for you?  My wife has MS, and I can no longer take care of her, and notify MD (or speech pathologist) if coughing or persistent throat clearing or wet/gurgly vocal quality is noted  Outcome: Not Progressing     Problem: Patient/Family Goals  Goal: Patient/Family Long Term Goal  Description: Patient's Long Term Goal: re

## 2021-07-09 NOTE — PROGRESS NOTES
[facility]    Progress Note    Good Silva Patient Status:  Inpatient    1932 MRN [id number]   Location [facility] 3W/SW Attending Audi Dwyer MD   Hosp Day # 4 PCP Selvin Gleason MD     CC: Dizziness fall  Subjective: aspirin  300 mg Rectal Daily    Or   • aspirin  325 mg Oral Daily   • atorvastatin  40 mg Oral Nightly   • Heparin Sodium (Porcine)  5,000 Units Subcutaneous 2 times per day   • Clopidogrel Bisulfate  75 mg Oral Daily   • ferrous sulfate  325 mg Oral Daily Dictated by (CST): Ender Limon MD on 7/09/2021 at 1:16 PM     Finalized by (CST): Ender Limon MD on 7/09/2021 at 1:19 PM          XR CHEST AP PORTABLE  (CPT=71045)    Result Date: 7/9/2021  CONCLUSION:  1. Post intubation.   Satisfactory position of 7/8/2021  CONCLUSION:  1. The feeding tube has been repositioned, and now curls back upon itself slightly at the fundus of the stomach. The remainder of the chest is unchanged.     Dictated by (CST): Carter Crook MD on 7/08/2021 at 5:05 PM     Finalized Dysphagia  - failed swallow eval  - NPO  - video swallow when able  - dobhoff and dietician for now      HFrEF - Acute on chronic   H/o CAD s/p PCI  - cont ASA/Plavix, statin  - cards following.  Pt see's [facility] cards Dr. Kathe Tinoco d/w pt he tells me his copay

## 2021-07-09 NOTE — PROGRESS NOTES
Vascular Access Note    Vascular Access Screening:   Allergies to Lidocaine: no  Allergies to Latex: no  Presence of Pacemaker/Defibrillator: left side  Mastectomy with Lymph Node Dissection: No  AV Fistula / AV Graft: No  Dialysis Catheter: No  Central Wu Mckeon PICC RN ASAP for:  • signs and symptoms of bleeding at the insertion site  • severe pain or discomfort, numbness, and/ or swelling at the insertion site

## 2021-07-09 NOTE — PROGRESS NOTES
[facility]     Progress Note        Ernesto Pizarro Patient Status:  Inpatient    1932 MRN [id number]   Location [facility] 2W/SW Attending Vic Grayson MD   [facility] Day # 4 PCP Charis Ceron MD       Subjective:   Patient seen mL, 50 mL, Intravenous, Q15 Min PRN   Or  glucose (DEX4) oral liquid 30 g, 30 g, Oral, Q15 Min PRN   Or  Glucose-Vitamin C (DEX-4) chewable tab 8 tablet, 8 tablet, Oral, Q15 Min PRN  Insulin Regular Human (NOVOLIN R) 100 UNIT/ML injection 1-7 Units, 1-7 Un affecting neck  Neck: supple, no JVD  Cardio: RRR, S1 S2  Respiratory: Bilateral crackles present  GI: abdomen soft, non tender, active bowel sounds, no organomegaly  Extremities: no clubbing, cyanosis, edema  Neurologic: Does not follow commands  Skin: wa 7/9/2021  CONCLUSION:  1. Worsening right lung base airspace disease suggesting worsening right basal pneumonia and or atelectasis. Persistent opacification of the left lung base suggesting left basal atelectasis.   Mild cardiomegaly and prominent pulmonar PORTABLE  (CPT=71045)    Result Date: 7/8/2021  CONCLUSION:  1. Unfavorable change with probable bronchus intermedius obstruction with right middle and right lower lobe collapse. 2. Stable cardiomegaly. 3. Pacemaker. 4. The service will be notified.     Dic care time spent 45 minutes        Marlene Hurst DO  Pulmonary [address] [place] [address]

## 2021-07-09 NOTE — PROGRESS NOTES
[facility]    Progress Note    Hermilo Bravo Patient Status:  Inpatient    1932 MRN [id number]   Location [facility] 3W/SW Attending Arash Neely MD   Hosp Day # 4 PCP Mitra Koenig MD     CC: Dizziness fall  Subjective: • atorvastatin  40 mg Oral Nightly   • Heparin Sodium (Porcine)  5,000 Units Subcutaneous 2 times per day   • Clopidogrel Bisulfate  75 mg Oral Daily   • ferrous sulfate  325 mg Oral Daily   • Pantoprazole Sodium  20 mg Oral QAM AC       Current PRN Inpa does not appear to be kinked. 2. Worsening left base consolidation/atelectasis. Follow-up studies are advised. Persistent right base opacification/atelectasis.      Dictated by (CST): Debra Cedillo MD on 7/08/2021 at 6:37 PM     Finalized by (CST): [facility] significant stenosis   - monitor on tele. - neuro also ordered MG workup panel pending.   - ST/PT/OT following  - PMR consult.    - now intubated     Pneumomediastinum   S/p intubation with soft tissue emphysema right side neck   Small right apical pneumo

## 2021-07-09 NOTE — RESPIRATORY THERAPY NOTE
Pt on BiPAP settings 12/8/90%  Weaned pt from 100% to 90%. Pt tolerating and saturating appropriately. No acute changes overnight, RT to continue to monitor.

## 2021-07-10 NOTE — RESPIRATORY THERAPY NOTE
Pt received intubated A/C 20/550/60%+5. No changes or respiratory occurrence on PM shift. Nebulizer treatment given as ordered,suctioned as needed. RT will continue to monitor.

## 2021-07-10 NOTE — PLAN OF CARE
Problem: Patient Centered Care  Goal: Patient preferences are identified and integrated in the patient's plan of care  Description: Interventions:  - What would you like us to know as we care for you?  My wife has MS, and I can no longer take care of her, changes in mentation and behavior  - Position to facilitate oxygenation and minimize respiratory effort  - Oxygen supplementation based on oxygen saturation or ABGs  - Provide Smoking Cessation handout, if applicable  - Encourage broncho-pulmonary hygiene wrist restraints remain in place for safety awareness while vented. Hemodynamically stable. Afebrile. Intubated and on full vent support via ETT. Tolerating tube feeding now at goal. Adequate urine output via lucio.  Plan of care addressed but unable to com

## 2021-07-10 NOTE — PROGRESS NOTES
[facility]  [address] [facility], [address] [place] [address]  [phone]          INPATIENT NEUROLOGY   FOLLOW UP [facility]     Report of Consultation    Johnathon Vieira Patient Status:  Inpatient patient is moving all extremities although there is significantly decreased movement in the left upper extremity. –Acetylcholine receptor binding, blocking, striated and MuSK antibodies all negative.   Modulating antibodies were not checked in lab because

## 2021-07-10 NOTE — PLAN OF CARE
Problem: Patient Centered Care  Goal: Patient preferences are identified and integrated in the patient's plan of care  Description: Interventions:  - What would you like us to know as we care for you?  My wife has MS, and I can no longer take care of her, (or speech pathologist) if coughing or persistent throat clearing or wet/gurgly vocal quality is noted  Outcome: Progressing     Problem: Patient/Family Goals  Goal: Patient/Family Long Term Goal  Description: Patient's Long Term Goal: return home    Inter condition, hydration, nutrition and elimination needs   - Reorient and redirection as needed  - Assess for the need to continue restraints  Outcome: Progressing     Problem: Diabetes/Glucose Control  Goal: Glucose maintained within prescribed range  Kourtney

## 2021-07-10 NOTE — PLAN OF CARE
No changes to vent overnight. Suction provided as indicated. RT to continue to monitor.    Problem: RESPIRATORY - ADULT  Goal: Achieves optimal ventilation and oxygenation  Description: INTERVENTIONS:  - Assess for changes in respiratory status  - Assess fo

## 2021-07-10 NOTE — PROGRESS NOTES
[facility]     Progress Note        Renuka Distance Patient Status:  Inpatient    1932 MRN [id number]   Location [facility] 2W/SW Attending Thelma Stewart MD   Hosp Day # 5 PCP Jose Kruse MD       Subjective:   Patient seen Tube, Daily  Piperacillin Sod-Tazobactam So (ZOSYN) 3.375 g in dextrose 5% 100 mL IVPB-ADDV, 3.375 g, Intravenous, Q12H  Cetirizine HCl (ZYRTEC) tab 5 mg, 5 mg, Oral, Daily  Metoclopramide HCl (REGLAN) injection 5 mg, 5 mg, Intravenous, Q8H PRN  dextrose 1 HCT 31.1 07/10/2021    .0 07/10/2021    CREATSERUM 1.23 07/10/2021    BUN 69 07/10/2021     07/10/2021    K 4.0 07/10/2021     07/10/2021    CO2 27.0 07/10/2021     07/10/2021    CA 8.3 07/10/2021       XR CHEST AP PORTABLE  (C by (CST): Nery Langley MD on 7/09/2021 at 11:57 AM     Finalized by (CST): Nery Langley MD on 7/09/2021 at 12:05 PM          XR CHEST AP PORTABLE  (CPT=71045)    Result Date: 7/9/2021  CONCLUSION:  1.  Worsening right lung base airspace disease suggesting w approximately 1501 hours. Dictated by (CST): Yumi Mandel MD on 7/08/2021 at 2:59 PM     Finalized by (CST): Yumi Mandel MD on 7/08/2021 at 3:01 PM                  Assessment   1. Acute hypoxemic hypercapnic respiratory failure  2.   Hypercapnic e

## 2021-07-10 NOTE — PROGRESS NOTES
[facility]    Progress Note    Bridport Child Patient Status:  Inpatient    1932 MRN [id number]   Location [facility] 2W/SW Attending Miranda Holden, DO   Hosp Day # 5 PCP Konstantin Shay MD       Subjective:   Neeta Child (CPT=71045)    Result Date: 7/10/2021  CONCLUSION:  1. Lines/tubes in stable customary positioning. 2. Trace right apical the thorax has decreased in size.   Subcutaneous emphysema in the right supraclavicular region extending to the base of the neck has al worsening right basal pneumonia and or atelectasis. Persistent opacification of the left lung base suggesting left basal atelectasis. Mild cardiomegaly and prominent pulmonary vascularity suggesting early pulmonary congestive changes.   Correlate clinical

## 2021-07-10 NOTE — PROGRESS NOTES
[facility]  Cardiology Progress Note    Yani Santamaria Patient Status:  Inpatient    1932 MRN [id number]   Location [facility] 2W/SW Attending Breanne Young MD   Hosp Day # 5 PCP Ericka Lazo MD       Subjective      Re NG Right nare) -- 0 --    Total Output 300 3300 --       Net I/O     224 -2482.5 --           Vent Settings: Vent Mode: VC/AC  FiO2 (%):  [50 %-100 %] 50 %  S RR:  [20] 20  S VT:  [550 mL] 550 mL  PEEP/CPAP (cm H2O):  [5 cm H20] 5 cm H20  MAP (cm H2O):  [9 20* 37* 52*   CA 8.0* 8.1* 8.3*    139 144   K 4.7 4.5 4.0    107 112   CO2 22.0 25.0 27.0     Recent Labs   Lab 07/08/21  0420 07/09/21  0425 07/10/21  0528   RBC 4.31 4.39 3.50*   HGB 12.6* 12.7* 10.4*   HCT 39.3 40.8 31.1*   MCV 91.2 92.9 88 atelectasis. Persistent opacification of the left lung base suggesting left basal atelectasis. Mild cardiomegaly and prominent pulmonary vascularity suggesting early pulmonary congestive changes. Correlate clinically and follow-up studies are advised.  2 obstruction with right middle and right lower lobe collapse. 2. Stable cardiomegaly. 3. Pacemaker. 4. The service will be notified. Dictated by (CST): Lilli Wilkins MD on 7/08/2021 at 9:35 AM     Finalized by (CST):  Lilli Wilkins MD on 7/08/2021 atrium: The atrium was moderately dilated. Compared to the previous study these findings represent indicate worsening. ------------------------------------------------------------------- Study data:  Comparison was made to the study of 02/17/2018.   Study s atrium was severely dilated. Right ventricle: The cavity size was normal. Pacer wire or catheter noted in right ventricle. Systolic function was normal. Pulmonic valve:   Not well visualized. The valve appears to be grossly normal.    Doppler:    There wa 02/17/2018 Reference  IVS thickness, ED      (H)     1.2   cm     1.4        0.6 - 1.0   LVOT                           Value        02/17/2018 Reference  LVOT ID, S                     2.2   cm     ---------- -----------  Stroke volume (SV),            47 specified reference range. Electronically signed       07/06/2021 13:23 Junior Padron MD     Exam:     General: Somnolent on BIPAP. HEENT: Normocephalic, anicteric sclera, neck supple, no thyromegaly or adenopathy. Neck: No JVD, carotids 2+, no bruits.

## 2021-07-11 NOTE — PROGRESS NOTES
[facility]  [address] [facility], [address] [place] [address]  [phone]          INPATIENT NEUROLOGY   FOLLOW UP [address] [facility]     Report of Consultation    Yani Santamaria Patient Status:  Inpatient binding, blocking, striated and MuSK antibodies all negative.   Modulating antibodies were not checked in lab because blocking antibodies wer negativee   –CT brain 7/5/2021 without any acute process, generalized atrophy and mild chronic microvascular ischem

## 2021-07-11 NOTE — PROGRESS NOTES
[facility]     Progress Note        Yaneli Jensen Patient Status:  Inpatient    1932 MRN [id number]   Location [facility] 2W/SW Attending Jordan Langford MD   [address] Day # 6 PCP Tanner Hernandez MD       Subjective:   Patient seen Oral, Q15 Min PRN  Insulin Regular Human (NOVOLIN R) 100 UNIT/ML injection 1-7 Units, 1-7 Units, Subcutaneous, 4 times per day  vancomycin HCl (FIRVANQ) 50 MG/ML oral solution 125 mg, 125 mg, Per NG Tube, Daily  Cetirizine HCl (ZYRTEC) tab 5 mg, 5 mg, Oral 10.4 07/11/2021    HCT 32.1 07/11/2021    .0 07/11/2021    CREATSERUM 1.05 07/11/2021    BUN 45 07/11/2021     07/11/2021    K 4.9 07/11/2021     07/11/2021    CO2 29.0 07/11/2021     07/11/2021    CA 8.4 07/11/2021       XR CHEST at 1:16 PM     Finalized by (CST): Baldo Monterroso MD on 7/09/2021 at 1:19 PM          XR CHEST AP PORTABLE  (CPT=71045)    Result Date: 7/9/2021  CONCLUSION:  1. Post intubation. Satisfactory position of endotracheal tube.  2.  Interval development of pne clinical condition with son Eugenia Schmidt earlier. Patient has stated earlier during hospital course does not want prolonged course of intubation. Son will discuss DNR status with mother and contact us afterwards. He does understand patient is critically ill.

## 2021-07-11 NOTE — PLAN OF CARE
Problem: NEUROLOGICAL - ADULT  Goal: Achieves stable or improved neurological status  Description: INTERVENTIONS  - Assess for and report changes in neurological status  - Initiate measures to prevent increased intracranial pressure  - Maintain blood pre facilitate oxygenation and minimize respiratory effort  - Oxygen supplementation based on oxygen saturation or ABGs  - Provide Smoking Cessation handout, if applicable  - Encourage broncho-pulmonary hygiene including cough, deep breathe, Incentive Spiromet TMAX 102 today, rectal tylenol administered twice thus far and now ice packs have been applied to patients axillary areas.   Per Pharmacy this morning, do not administered IVIG until the patients temp is below 100.3 (this was told to this RN in morning repo

## 2021-07-11 NOTE — PROGRESS NOTES
[facility] Pharmacy Note:  Renal Dose Adjustment for Metoclopramide (REGLAN)    Renuka So has been prescribed Metoclopramide (REGLAN) 5 mg every 8 hours as needed for nausea/vomiting,.     Estimated Creatinine Clearance: 46.2 mL/min (based on SCr of 1.05 mg/dL

## 2021-07-11 NOTE — RESPIRATORY THERAPY NOTE
Received PT intubated with 8.0 ETT secured 23 cma at the lips on vent with the following settings; AC 20 500 40% +5. BS heard bilaterally, SXN provided as needed.  No other changes made at this time, RT to continue to monitor.

## 2021-07-11 NOTE — PLAN OF CARE
Problem: Patient Centered Care  Goal: Patient preferences are identified and integrated in the patient's plan of care  Description: Interventions:  - What would you like us to know as we care for you?  My wife has MS, and I can no longer take care of her, and notify MD (or speech pathologist) if coughing or persistent throat clearing or wet/gurgly vocal quality is noted  Outcome: Not Progressing     Problem: Patient/Family Goals  Goal: Patient/Family Long Term Goal  Description: Patient's Long Term Goal: re physical comfort, circulation, skin condition, hydration, nutrition and elimination needs   - Reorient and redirection as needed  - Assess for the need to continue restraints  Outcome: Not Progressing     Problem: Diabetes/Glucose Control  Goal: Glucose ma

## 2021-07-11 NOTE — RESPIRATORY THERAPY NOTE
Received PT intubated with 8.0 ETT secured 23 cm at the lips on vent with the following settings; AC 20 500 40% +5. BS heard bilaterally, SXN provided as needed. Pt sedated and comfortable on the vent. No weaning today.  RT to continue to monitor.

## 2021-07-11 NOTE — PLAN OF CARE
Problem: NEUROLOGICAL - ADULT  Goal: Achieves stable or improved neurological status  Description: INTERVENTIONS  - Assess for and report changes in neurological status  - Initiate measures to prevent increased intracranial pressure  - Maintain blood pre hypoglycemia  - Administer ordered medications to maintain glucose within target range  - Assess barriers to adequate nutritional intake and initiate nutrition consult as needed  - Instruct patient on self management of diabetes  Outcome: Progressing

## 2021-07-11 NOTE — PROGRESS NOTES
[facility] Pharmacy Note:  Renal Adjustment for piperacillin/tazobactam (Peg Deem)    Raphael Griffin is a 80year old patient who has been prescribed piperacillin/tazobactam (ZOSYN) 3.375g every 12 hrs.   The estimated creatinine clearance is 46.2 mL/min (based on SCr

## 2021-07-11 NOTE — PROGRESS NOTES
[facility]  Cardiology Progress Note    Darlene Archuleta Patient Status:  Inpatient    1932 MRN [id number]   Location [facility] 2W/SW Attending Bety Loredo MD   [address] Day # 6 PCP MD Fermin Kaur     [facility] Net I/O     224 -2482.5 --           Vent Settings: Vent Mode: VC/AC  FiO2 (%):  [40 %-60 %] 40 %  S RR:  [20] 20  S VT:  [550 mL] 550 mL  PEEP/CPAP (cm H2O):  [5 cm H20] 5 cm H20  MAP (cm H2O):  [8.7-10] 10    Scheduled Meds:   • ipratropium-albuterol K 4.5 4.0 4.9    112 114*   CO2 25.0 27.0 29.0     Recent Labs   Lab 07/09/21  0425 07/10/21  0528 07/11/21  0615   RBC 4.39 3.50* 3.56*   HGB 12.7* 10.4* 10.4*   HCT 40.8 31.1* 32.1*   MCV 92.9 88.9 90.2   MCH 28.9 29.7 29.2   MCHC 31.1 33.4 32.4 pneumothorax occupying less than 5% of hemithorax volume. No tracheal-mediastinal shift. 4.  Interval development of soft tissue emphysema around the neck most prominent in the right supraclavicular region.   Exam discussed with Dr. Jennifer Wilburn on 7/9/21 at 15 Date: 7/8/2021  CONCLUSION:  1. The feeding tube is coiled in the hypopharynx of the neck and should be pulled back and repositioned into the esophagus and stomach. The remainder of the chest is unchanged.  2. Results were called to the nursing floor by nathan myocardium; severe hypokinesis of the mid anteroseptal, mid    inferoseptal, and mid-apical inferior myocardium. 3. Left atrium: The atrium was severely dilated. 4. Right atrium: The atrium was moderately dilated.  Compared to the previous study these findi thickened leaflets. Mobility was not restricted. Doppler: There was no evidence for stenosis. No regurgitation. Peak gradient (D): 2mm Hg. Left atrium:  The atrium was severely dilated. Right ventricle:   The cavity size was normal. Pacer wire or c 7.3   cm/sec ---------- -----------  LV E/e', average               10           ---------- -----------   Ventricular septum             Value        02/17/2018 Reference  IVS thickness, ED      (H)     1.2   cm     1.4        0.6 - 1.0   LVOT 3.2   cm     ---------- -----------  RV pressure, S, DP             28    mm Hg  ---------- ----------- Legend: (L)  and  (H)  denny values outside specified reference range.  Electronically signed       07/06/2021 13:23 Carlton Correa MD     Exam:

## 2021-07-12 NOTE — PROGRESS NOTES
Patient received on following vent setting with ET tube 8.0 secured at 23 @ lips. Patient awake and alert with sedation off, but not follow command. No SBT today per Dr. Humble Bagley. Suction moderate amount of bloody thick secretion.      07/12/21 0801   Vent Inf

## 2021-07-12 NOTE — PLAN OF CARE
Patient remains in bilateral wrist restraints. Patient is at risk for pulling lines/tubes. ROM performed when able to.      Problem: Safety Risk - Non-Violent Restraints  Goal: Patient will remain free from self-harm  Description: INTERVENTIONS:  - Apply th

## 2021-07-12 NOTE — RESPIRATORY THERAPY NOTE
Pt received on vent EET # 8.0 secured 23cm @ lips. Vent setting A/C 20/550/40%+5. Pt suctioned as needed,pt saturating appropriately,continue to monitor.

## 2021-07-12 NOTE — PROGRESS NOTES
[facility]  Cardiology Progress Note    Karolina Ogden Patient Status:  Inpatient    1932 MRN [id number]   Location [facility] 2W/SW Attending Arnold Figueroa, DO   Hosp Day # 7 PCP Vernon Velazquez MD       Subjective in dextrose 5% 100 mL IVPB-ADDV) 100 -- --    Total Intake 2453 1988 --       Output    Urine  1770  1525  --    Urine -- 425 --    Output (mL) (Urethral Catheter) 1770 1100 --    Emesis/NG output  0  --  --    Output (mL) (Feeding Tube NG Right nare) 0 -- 06/19/2020    MG 3.1 (H) 07/09/2021    PHOS 4.1 07/09/2021    TROP <0.045 07/07/2021    CK 37 (L) 07/05/2021    B12 343 05/17/2021       Recent Labs   Lab 07/10/21  0528 07/11/21  0615 07/12/21  0503   * 169* 180*   BUN 69* 45* 41*   CREATSERUM 1.23 XR CHEST AP PORTABLE  (CPT=71045)    Result Date: 7/10/2021  CONCLUSION:  1. Lines/tubes in stable customary positioning. 2. Trace right apical the thorax has decreased in size.   Subcutaneous emphysema in the right supraclavicular region extending to t [facility]* -------------------------------------------------------------------      Transthoracic Echocardiography M-mode, complete 2D, complete spectral Doppler, color Doppler, and IV contrast ---------------------------------------------------- Scanning was performed from the parasternal, apical, and subcostal acoustic windows. Intravenous contrast (Definity) was administered. Study completion:  The patient tolerated the procedure well. There were no complications.  Transthoracic echocardiography restricted. Doppler: There was no evidence for stenosis. Mild regurgitation. Pulmonary artery:   Normal pulmonary artery pressure. Right atrium:  The atrium was moderately dilated. Pericardium:  There was no pericardial effusion.  Quality of study:  I Aorta                          Value        02/17/2018 Reference  Aortic root ID                 3.8   cm     ---------- &lt;4.1  Ascending aorta ID,            3.7   cm     ---------- 2.2 - 3.8  A-P, S   Left atrium                    Value        02/17/2 rhonchi. Intubated. Abdomen: Soft, non-tender. No organosplenomegally, mass or rebound, BS-present. Extremities: Without clubbing, cyanosis or edema.    Neurologic: Sedated. Moves all 4 extremities.   Skin: Warm and dry.     Assessment and Plan:   Dyspne

## 2021-07-12 NOTE — PROGRESS NOTES
[facility]    Progress Note      Assessment and Plan:     1. Respiratory failure–aspiration pneumonitis with suspected myasthenic crisis. Remains quite weak. Has had fever as well. Is broadly covered with antibiotic.   His fevers were li regular rate and rhythm no murmur. Abdomen nontender, without hepatosplenomegaly and no mass appreciable. Extremities without clubbing cyanosis nor edema. Neurologic can be aroused to open eyes but following poorly.   Slightly weaker on the left and t

## 2021-07-12 NOTE — RESPIRATORY THERAPY NOTE
Pt on current vent settings 20/550/+5/40% ; ETT 7.5 @ 23 cm. Suctioned pt as needed throughout the night. Pt tolerating and saturating appropriately. No acute changes overnight, RT to continue to monitor.

## 2021-07-12 NOTE — PLAN OF CARE
Madhu Flaming remains on the ventilator, not on any sedation. Patient is able to open eyes to voice and name, minimally following commands. VSS, blood pressure within range. Patient remains febrile, PRN given and ice packs placed.  Flexiseal placed, multiple black BM Patient's Short Term Goal: get stronger    Interventions:   - neurology consult  - PT eval  -OT eval  -Speech eval  -IVF  -ASA/Plavix  -2D echo  -CT head  - See additional Care Plan goals for specific interventions  7/12/2021 1708 by Bree Lamar, RN bleeding, hypotension and signs of decreased cardiac output  - Evaluate effectiveness of vasoactive medications to optimize hemodynamic stability  - Monitor arterial and/or venous puncture sites for bleeding and/or hematoma  - Assess quality of pulses, ski Progressing  7/12/2021 1708 by Bree Lamar, RN  Outcome: Not Progressing     Problem: Patient/Family Goals  Goal: Patient/Family Long Term Goal  Description: Patient's Long Term Goal: return home    Interventions:  - neurology consult  - PT eval  -O

## 2021-07-12 NOTE — DIETARY NOTE
ADULT NUTRITION REASSESSMENT    Pt is at high nutrition risk. Pt meets moderate malnutrition criteria.       CRITERIA FOR MALNUTRITION DIAGNOSIS:  Criteria for non-severe malnutrition diagnosis: chronic illness related to energy intake less than75% for gre 7/12 Update: Day#4 Tube feeds at goal rate. Remains intubated. Neuro:  CVA vs myasthenia gravid with crisis. Dark stools- (iron was being held) possible GI bleed. (noted X 10 stools on 7/11). No stools prior to this.   MD checking for blood in stool and fo C, dextrose, acetaminophen **OR** acetaminophen, Labetalol HCl, hydrALAzine HCl, acetaminophen, ondansetron HCl, LORazepam    LABS: reviewed Elevated BUN,normal creat- w/u for GI bleed in process. Hypernatremia- FWF increased.    Recent Labs     07/09/21  0 77.8 kg (171 lb 8 oz)  02/21/18 : 78.5 kg (173 lb)  06/01/17 : 78.2 kg (172 lb 6.4 oz)    NUTRITION DIAGNOSIS/PROBLEM:  Malnutrition related to decreased ability to consume sufficient energy as evidenced by energy intake less than75% for greater than 1 mon to provide >80% of nutrition needs, minimize refeeding risk.      DIETITIAN FOLLOW UP: RD to follow and monitor nutrition status      [facility] KEATON, LDN, [address]  ([id number])

## 2021-07-12 NOTE — PROGRESS NOTES
[facility]  [address] [facility], [address] [place] [address]  [phone]          INPATIENT NEUROLOGY   FOLLOW UP 1901 [facility]     Report of Consultation    Yinka Sigala Patient Status:  Inpatient thrombocytopenia      IMAGING:  N/A    ASSESSMENT:  The patient is a 80 year old man with past medical history of atrial fibrillation, hypertension, hyperlipidemia, coronary artery disease, heart failure with reduced EF, pacemaker placement, anxiety, anemi stimulation as an outpatient  –Give last dose of IVIG once he has been afebrile for 24 hours, hold off for today     Left hemiparesis, concerning for ischemic stroke --unable to have MRI brain.    –Continue aspirin, Plavix 75 mg and Lipitor 40 mg.  Had been

## 2021-07-12 NOTE — OCCUPATIONAL THERAPY NOTE
Communication Note to Team    Per EMR; pt t/f to CCU requiring intubation 7/9. Pt remains intubated & sedated at this time. OT order cancelled at this time. Please re-order therapy when medically appropriate.      Mauro Barreto MS OTR/L

## 2021-07-13 NOTE — PLAN OF CARE
Pt's eyes open when aroused. Follows command at times. Very weak. Orally intubated on ventilator. Good oxygenation. Moderate amounts of oral secretions, minimal inline. Cough with suction. Pt able to move 4 extremities. Warm to touch.  Tylenol and ice aroun symptoms of hyperglycemia and hypoglycemia  - Administer ordered medications to maintain glucose within target range  - Assess barriers to adequate nutritional intake and initiate nutrition consult as needed  - Instruct patient on self management of diabet perspectives and choices  Outcome: Not Progressing     Problem: NEUROLOGICAL - ADULT  Goal: Achieves maximal functionality and self care  Description: INTERVENTIONS  - Monitor swallowing and airway patency with patient fatigue and changes in neurological s disturbances, delirium, medications)  - Discontinue any unnecessary medical devices as soon as possible  - Assess the patient's physical comfort, circulation, skin condition, hydration, nutrition and elimination needs   - Reorient and redirection as needed

## 2021-07-13 NOTE — PROGRESS NOTES
[facility]  [address] [facility], [address] [place] [address]  [phone]          INPATIENT NEUROLOGY   FOLLOW UP 1901 [facility]     Report of Consultation    Neeta Child Patient Status:  Inpatient hypertension, hyperlipidemia, coronary artery disease, heart failure with reduced EF, pacemaker placement, anxiety, anemia who presented with generalized weakness and declining respiratory function.  Initial concern was for left hemiparesis but was found t aspirin, Plavix 75 mg and Lipitor 40 mg.  Had been on dual antiplatelets prior to admission. –Patient has not been interested in [address] given prior history of GI bleeding as per discussions with primary. Can be readdressed as an outpatient with patient.   –

## 2021-07-13 NOTE — PLAN OF CARE
No changes made to vent overnight. Suction provided as indicated. RT to continue to monitor.      Problem: RESPIRATORY - ADULT  Goal: Achieves optimal ventilation and oxygenation  Description: INTERVENTIONS:  - Assess for changes in respiratory status  - As

## 2021-07-13 NOTE — CONSULTS
[facility] [facility] - [facility] ID CONSULT NOTE    Hermilo Shelly Patient Status:  Inpatient    1932 MRN [id number]   Location [facility] 2W/SW Attending Colette Christian, [address] [facility] Day # 8 PCP Mitra Koenig MD       Reason for [facility] ANGIOPLAST 1 ART  2001    PTCA of the RCA     History reviewed. No pertinent family history. reports that he quit smoking about 61 years ago. He has never used smokeless tobacco. He reports that he does not drink alcohol and does not use drugs.     Allerg **OR** acetaminophen (TYLENOL) 650 MG rectal suppository 650 mg, 650 mg, Rectal, Q4H PRN  •  Labetalol HCl (TRANDATE) injection 10 mg, 10 mg, Intravenous, Q10 Min PRN  •  hydrALAzine HCl (APRESOLINE) injection 10 mg, 10 mg, Intravenous, Q2H PRN  •  aspirin displayed.      Recent Labs   Lab 07/09/21  0425 07/10/21  0528 07/11/21  0615 07/12/21  0503 07/13/21  0452   *   < > 169* 180* 185*   BUN 60*   < > 45* 41* 32*   CREATSERUM 1.63*   < > 1.05 0.96 0.81   GFRAA 43*   < > 73 81 92   GFRNAA 37*   < > 63 Follow fever curve, wbc  -  Reviewed labs, micro, imaging reports, available old records  -  Discussed with patient, RN, primary, IC    Thank you for allowing us to participate in the care of this patient.  Please do not hesitate to call if you have any que

## 2021-07-13 NOTE — PLAN OF CARE
Pt has poor safety awareness. Restraints continued for patient's safety.       Problem: Safety Risk - Non-Violent Restraints  Goal: Patient will remain free from self-harm  Description: INTERVENTIONS:  - Apply the least restrictive restraint to prevent harm

## 2021-07-13 NOTE — PROGRESS NOTES
[facility]  Cardiology Progress Note    Yuri Chavez Patient Status:  Inpatient    1932 MRN [id number]   Location [facility] 2W/SW Attending Diane Acevedo DO   Hosp Day # 8 PCP Radha Krishna MD       Subjective shifts:   Intake/Output                 07/11/21 0700 - 07/12/21 0659 07/12/21 0700 - 07/13/21 0659 07/13/21 0700 - 07/14/21 0659       Intake    P.O.  0  --  --    P. O. 0 -- --    I.V.  340  452  --    I.V. 335 452 --    Volume (mL) Propofol 5 -- --    Ot WBC 8.9 07/13/2021    HGB 9.5 (L) 07/13/2021    HCT 30.1 (L) 07/13/2021    .0 (L) 07/13/2021    CREATSERUM 0.81 07/13/2021    BUN 32 (H) 07/13/2021     (H) 07/13/2021    K 4.5 07/13/2021     (H) 07/13/2021    CO2 32.0 07/13/2021    GL right basal pulmonary opacity likely representing atelectasis however correlate for pneumonia. 3.  Stable/satisfactory position of endotracheal tube, right upper extremity PICC, and feeding tube. Post cardiac pacing device placement.  4.  Improving soft ti hypokinesis of the mid anteroseptal, mid    inferoseptal, and mid-apical inferior myocardium. 3. Left atrium: The atrium was severely dilated. 4. Right atrium: The atrium was moderately dilated.  Compared to the previous study these findings represent indic Mobility was not restricted. Doppler: There was no evidence for stenosis. No regurgitation. Peak gradient (D): 2mm Hg. Left atrium:  The atrium was severely dilated. Right ventricle:   The cavity size was normal. Pacer wire or catheter noted in rig cm/sec ---------- -----------  LV E/e', average               10           ---------- -----------   Ventricular septum             Value        02/17/2018 Reference  IVS thickness, ED      (H)     1.2   cm     1.4        0.6 - 1.0   LVOT ---------- -----------  RV pressure, S, DP             28    mm Hg  ---------- ----------- Legend: (L)  and  (H)  denny values outside specified reference range.  Electronically signed       07/06/2021 13:23 Annemarie Fields MD       Exam:     General: Int

## 2021-07-13 NOTE — PLAN OF CARE
Bilateral wrist restraints remain in place due to patient at risk for pulling at lines/tubes. ROM performed as able to. This RN reinforced the importance of the restraints to patient.    Problem: Safety Risk - Non-Violent Restraints  Goal: Patient will luis

## 2021-07-13 NOTE — PROGRESS NOTES
INFECTIOUS DISEASE PROGRESS NOTE  [facility] ID PROGRESS NOTE    Furnas Kins Patient Status:  Inpatient    1932 MRN [id number]   Location [facility] 2W/SW Attending Maciej Sanchez Day # 8 PCP Jorden to hospital 7/5 from home with a fall, blurry vision, left-sided weakness with initial concern for CVA versus myasthenia gravis. Seen by neurology and noted to have generalized weakness, left-sided ptosis. Started on IVIG.  Worsening respiratory status requ

## 2021-07-13 NOTE — PROGRESS NOTES
[facility]    Progress Note      Assessment and Plan:     1. Respiratory failure–aspiration pneumonitis with suspected myasthenic crisis. Remains quite weak but strength on examination today is somewhat improved.   Has had fever as well, w weight 148 lb 1.6 oz (67.2 kg), SpO2 96 %. Physical Exam lethargic white male  HEENT examination is unremarkable with pupils equal round and reactive to light and accommodation. Neck without adenopathy, thyromegaly, JVD nor bruit.    Lungs diminished a

## 2021-07-13 NOTE — PLAN OF CARE
Kendrick Bernabe is much more awake, following commands. Patient remains on ventilator, cpap trial since this morning. VSS. Patient remains with low-grade temps, PRN given and ice packs placed. Flexiseal in place. Rivas in place, adequate urine output.  Tube feeding Interventions:  - Patient should be alert and upright for all feedings (90 degrees preferred)  - Offer food and liquids at a slow rate  - No straws  - Encourage small bites of food and small sips of liquid  - Offer pills one at a time, crush or deliver wit ordered  - Assess for signs and symptoms of hyperglycemia and hypoglycemia  - Administer ordered medications to maintain glucose within target range  - Assess barriers to adequate nutritional intake and initiate nutrition consult as needed  - Instruct min restraints  7/13/2021 1845 by Mame Renee RN  Outcome: Not Progressing  7/13/2021 1740 by Mame Renee RN  Outcome: Not Progressing

## 2021-07-14 NOTE — RESPIRATORY THERAPY NOTE
MD asked for NIF on patient. NIF attempted multiple times, with the best NIF of -12. Results reported to APRN.

## 2021-07-14 NOTE — PLAN OF CARE
Pt remained on CPAP/PS (5/5) overnight as tolerated per MD.   Problem: RESPIRATORY - ADULT  Goal: Achieves optimal ventilation and oxygenation  Description: INTERVENTIONS:  - Assess for changes in respiratory status  - Assess for changes in mentation and b

## 2021-07-14 NOTE — PROGRESS NOTES
[facility] - [facility]  Cardiology Progress Note    Cha Upton Patient Status:  Inpatient    1932 MRN [id number]   Location [facility] 2W/SW Attending Oswaldo Lopez,    Hosp Day # 9 PCP Jassi Espinoza MD       Subjective Intake (mL) (Feeding Tube NG Right nare) 1453 1605 --    Total Intake 2755 3254 --       Output    Urine  1095  1325  --    Output (mL) (Urethral Catheter) 1095 1325 --    Emesis/NG output  0  --  --    Output (mL) (Feeding Tube NG Right nare) 0 -- --    S 3.1 (H) 07/09/2021    PHOS 4.1 07/09/2021    TROP <0.045 07/07/2021    CK 37 (L) 07/05/2021    B12 343 05/17/2021       Recent Labs   Lab 07/12/21  0503 07/13/21  0452 07/14/21  0626   * 185* 159*   BUN 41* 32* 27*   CREATSERUM 0.96 0.81 0.64*   GFR 7/12/2021  CONCLUSION:  1. Little change from July 11, 2021. 2. Borderline cardiomegaly. 3. Atherosclerosis. 4. Prominent markings with bibasilar lung consolidation/atelectasis and probable small effusions. 5. Catheter in superior vena cava.  6. Landry Roof wall motion abnormality: Akinesis of the apical    myocardium; severe hypokinesis of the mid anteroseptal, mid    inferoseptal, and mid-apical inferior myocardium. 3. Left atrium: The atrium was severely dilated.  4. Right atrium: The atrium was moderately aortic root was normal in size. Mitral valve:   Normal thickened leaflets. Mobility was not restricted. Doppler: There was no evidence for stenosis. No regurgitation. Peak gradient (D): 2mm Hg. Left atrium:  The atrium was severely dilated.  Right 13.4         4.5        -----------  LV e', average                 7.3   cm/sec ---------- -----------  LV E/e', average               10           ---------- -----------   Ventricular septum             Value        02/17/2018 Reference  IVS thickness, E Value        02/17/2018 Reference  RV ID, ED, PLAX                3.2   cm     ---------- -----------  RV pressure, S, DP             28    mm Hg  ---------- ----------- Legend: (L)  and  (H)  denny values outside specified reference range.  Electronically s

## 2021-07-14 NOTE — PROGRESS NOTES
[facility]    Progress Note      Assessment and Plan:     1. Respiratory failure–aspiration pneumonitis with suspected myasthenic crisis.   The patient has tolerated CPAP 5 and pressure support of 5, breathing slowly with good tidal volumes and rhythm no murmur. Abdomen nontender, without hepatosplenomegaly and no mass appreciable. Extremities without clubbing cyanosis nor edema. Neurologic less interactive today. Move his his extremities a little better with weaker handgrip.   Skin wit

## 2021-07-14 NOTE — CM/SW NOTE
Received call from Roberto with [employer] at [phone] regarding Hang Hutchins, wife of patient, and her transportation needs.     Henry Valadez with [employer] transported patient to [address] [place] [address] and call [facility] to make sure that Hang Hutchins had their numb

## 2021-07-14 NOTE — PLAN OF CARE
Continues on the vent. Soft wrist restraints continue for patient safety. Bath completed and linens changed. Will continue to monitor.      Problem: Safety Risk - Non-Violent Restraints  Goal: Patient will remain free from self-harm  Description: INTERVENTI

## 2021-07-14 NOTE — PLAN OF CARE
Problem: Patient Centered Care  Goal: Patient preferences are identified and integrated in the patient's plan of care  Description: Interventions:  - What would you like us to know as we care for you?  My wife has MS, and I can no longer take care of her, Encourage broncho-pulmonary hygiene including cough, deep breathe, Incentive Spirometry  - Assess the need for suctioning and perform as needed  - Assess and instruct to report SOB or any respiratory difficulty  - Respiratory Therapy support as indicated hematoma  - Assess quality of pulses, skin color and temperature  - Assess for signs of decreased coronary artery perfusion - ex.  Angina  - Evaluate fluid balance, assess for edema, trend weights  Outcome: Progressing  Goal: Absence of cardiac arrhythmias

## 2021-07-14 NOTE — PROGRESS NOTES
INFECTIOUS DISEASE PROGRESS NOTE  [facility] ID PROGRESS NOTE    Marissa Vo Patient Status:  Inpatient    1932 MRN [id number]   Location [facility] 2W/SW Attending Grisel Buchanan Day # 9 PCP Jorden CHF with AICD now presents to hospital 7/5 from home with a fall, blurry vision, left-sided weakness with initial concern for CVA versus myasthenia gravis. Seen by neurology and noted to have generalized weakness, left-sided ptosis. Started on IVIG.  Worsen

## 2021-07-14 NOTE — PROGRESS NOTES
[facility]  [address] [facility], [address] [place] [address]  [phone]          INPATIENT NEUROLOGY   FOLLOW UP [address] [facility]     Report of Consultation    Valerio Hubbard Patient Status:  Inpatient difficulty and ptosis with a diurnal pattern there was concern for myasthenia gravis and patient is undergoing a trial of IVIG.  Respiratory function declined, requiring intubation on 7/9/2021.  On neurological examination, patient awakens and follows simp brain.    –Continue aspirin, Plavix 75 mg and Lipitor 40 mg.  Had been on dual antiplatelets prior to admission.   –Patient has not been interested in [address] given prior history of GI bleeding as per discussions with primary.  Can be readdressed as an outpatie

## 2021-07-15 NOTE — PLAN OF CARE
Remains at risk of pulling medically necessary lines. Restraints continued for patient safety.      Problem: Safety Risk - Non-Violent Restraints  Goal: Patient will remain free from self-harm  Description: INTERVENTIONS:  - Apply the least restrictive rest

## 2021-07-15 NOTE — PROGRESS NOTES
[facility]    Progress Note      Assessment and Plan:     1. Respiratory failure–aspiration pneumonitis with suspected myasthenic crisis. The patient was placed back on assist control last night but will trial CPAP again this morning.   Yareli Richard reactive to light and accommodation. Neck without adenopathy, thyromegaly, JVD nor bruit. Lungs diminished at right base to auscultation and percussion. Cardiac regular rate and rhythm no murmur.    Abdomen nontender, without hepatosplenomegaly and no

## 2021-07-15 NOTE — PROGRESS NOTES
INFECTIOUS DISEASE PROGRESS NOTE  [facility] ID PROGRESS [facility]    Julienne Gut Patient Status:  Inpatient    1932 MRN [id number]   Location [facility] 2W/SW Attending Luis Zamudio Day # 10 PCP Da presents to hospital 7/5 from home with a fall, blurry vision, left-sided weakness with initial concern for CVA versus myasthenia gravis. Seen by neurology and noted to have generalized weakness, left-sided ptosis. Started on IVIG.  Worsening respiratory st

## 2021-07-15 NOTE — PLAN OF CARE
Patient unresponsive and only withdrawing from pain this morning. Vital signs as charted. CT scan of chest, abdomen, and pelvis done. Tube feedings per order. Accuchecks done q6 hours, no insulin coverage needed today per ordered sliding scale.  Rivas and f from PT/OT  - Encourage visually impaired, hearing impaired and aphasic patients to use assistive/communication devices  Outcome: Progressing     Problem: Patient/Family Goals  Goal: Patient/Family Long Term Goal  Description: Patient's Long Term Goal: ret Progressing     Problem: CARDIOVASCULAR - ADULT  Goal: Maintains optimal cardiac output and hemodynamic stability  Description: INTERVENTIONS:  - Monitor vital signs, rhythm, and trends  - Monitor for bleeding, hypotension and signs of decreased cardiac ou medical devices as soon as possible  - Assess the patient's physical comfort, circulation, skin condition, hydration, nutrition and elimination needs   - Reorient and redirection as needed  - Assess for the need to continue restraints  Outcome: Not Progres

## 2021-07-15 NOTE — PROGRESS NOTES
[facility]  [address] [facility], [address] [place] [address]  [phone]          INPATIENT NEUROLOGY   FOLLOW UP 1901 [facility]     Report of Consultation    Dot Tavo Patient Status:  Inpatient examination, patient does not awaken or follows commands today, with generalized weakness more prominent in his left upper extremity > left lower extremity.  His breathing has improved with IVIG initially but this is plateaued in the setting of aspiration not been interested in [address] given prior history of GI bleeding, can be readdressed as an outpatient with patient. This note was prepared using [employer] voice recognition dictation software and as a result, errors may occur.  When identified, these

## 2021-07-15 NOTE — CM/SW NOTE
Care Progression Note:  Active Acute Medical Issue:   Acute ischemic stroke (HCC)   Respiratory failure–aspiration pneumonitis with suspected myasthenic crisis    Pt remains intubated with no sedation, per nursing decreased responsiveness today.    IVIG com

## 2021-07-15 NOTE — PLAN OF CARE
Patient is alert and able to nod yes and no. He was not able to use his hands earlier in the shift. He is now able to  with both hands. Wife is at the bedside. Will continue to monitor.    Problem: Patient Centered Care  Goal: Patient preferences are id slow rate  - No straws  - Encourage small bites of food and small sips of liquid  - Offer pills one at a time, crush or deliver with applesauce as needed  - Discontinue feeding and notify MD (or speech pathologist) if coughing or persistent throat clearing

## 2021-07-15 NOTE — PLAN OF CARE
Problem: RESPIRATORY - ADULT  Goal: Achieves optimal ventilation and oxygenation  Description: INTERVENTIONS:  - Assess for changes in respiratory status  - Assess for changes in mentation and behavior  - Position to facilitate oxygenation and minimize r wean? No   Spontaneous Breathing Trial Time Initiated 0728   Spontaneous Breathing Trial Method 10/5   Spontaneous Breathing Trial Settings 10/5   Pre Trial HR 75   Pre Trial RR 22   Pre Trial SpO2 98 %   Pre Trial /54   Pre Trial Vt 400   Post Trial

## 2021-07-15 NOTE — PROGRESS NOTES
[facility]  Cardiology Progress Note    Valerio Proper Patient Status:  Inpatient    1932 MRN [id number]   Location [facility] 2W/SW Attending Zelda Mendoza, DO   Hosp Day # 10 PCP Greta Vargas MD     Subjective in sodium chloride 0.9% 100 mL IVPB-MBP) -- 200 100    Total Intake 3254 3671.9 160       Output    Urine  1325  1460  --    Urine -- 300 --    Output (mL) (Urethral Catheter) 1325 1160 --    Stool  25  250  --    Stool Count Calculated for I/O 2 x -- -- <0.045 07/07/2021    CK 37 (L) 07/05/2021    B12 343 05/17/2021       Recent Labs   Lab 07/13/21  0452 07/14/21  0626 07/15/21  0413   * 159* 152*   BUN 32* 27* 32*   CREATSERUM 0.81 0.64* 0.77   GFRAA 92 101 94   GFRNAA 79 87 81   CA 8.1* 8.2* 7.6* minimal pneumonia/atelectasis. Improvement in the appearance of the left lung base since previous study suggesting some improvement in pneumonia/atelectasis. Prominent pulmonary markings  throughout the interstitium may be chronic.   Correlate clinically hypokinesis of the mid anteroseptal, mid    inferoseptal, and mid-apical inferior myocardium. 3. Left atrium: The atrium was severely dilated. 4. Right atrium: The atrium was moderately dilated.  Compared to the previous study these findings represent indic Mobility was not restricted. Doppler: There was no evidence for stenosis. No regurgitation. Peak gradient (D): 2mm Hg. Left atrium:  The atrium was severely dilated. Right ventricle:   The cavity size was normal. Pacer wire or catheter noted in rig cm/sec ---------- -----------  LV E/e', average               10           ---------- -----------   Ventricular septum             Value        02/17/2018 Reference  IVS thickness, ED      (H)     1.2   cm     1.4        0.6 - 1.0   LVOT ---------- -----------  RV pressure, S, DP             28    mm Hg  ---------- ----------- Legend: (L)  and  (H)  denny values outside specified reference range.  Electronically signed       07/06/2021 13:23 Manda Luciano MD       Exam:     Lisandro Yeboah

## 2021-07-16 NOTE — PROGRESS NOTES
INFECTIOUS DISEASE PROGRESS NOTE  [facility] ID PROGRESS NOTE    Kimcarmel Summers Patient Status:  Inpatient    1932 MRN [id number]   Location [facility] 2W/SW Attending Niki Gonzalez Day # 11 PCP Da HTN, HLD, CAD, CHF with AICD now presents to hospital 7/5 from home with a fall, blurry vision, left-sided weakness with initial concern for CVA versus myasthenia gravis. Seen by neurology and noted to have generalized weakness, left-sided ptosis.  Started

## 2021-07-16 NOTE — RESPIRATORY THERAPY NOTE
Received patient intubated/mechanicaly ventilated. Pt remains on PS/CPAP 10/5 FIO2 35%. Pulling adequate Vt and maintaining appropriate sats. Suction provided as indicated. No changes at this time. RT will continue to monitor. no

## 2021-07-16 NOTE — PROGRESS NOTES
[facility]    Progress Note    Bruna Freeman Patient Status:  Inpatient    1932 MRN [id number]   Location [facility] 2W/SW Attending Favio Carr, DO   Hosp Day # 11 PCP Myranda Parnell MD     HPI/Subjective:     Debi Atkins 7/15/2021  CONCLUSION:   Extensive consolidative airspace opacities within the bilateral lower lobes with air bronchograms suspicious for pneumonia.   Extensive semi solid and ground-glass airspace opacities within the bilateral upper lobes is also suspicio pneumonitis.     Dictated by (CST): Kyle Norwood MD on 7/15/2021 at 7:27 AM     Finalized by (CST): Kyle Norwood MD on 7/15/2021 at 7:30 AM                Assessment & Plan:       1-Acute respiratory failure / intubated on vent   –Aspiration pne

## 2021-07-16 NOTE — RESPIRATORY THERAPY NOTE
NIF done on PT through the vent and best outcome was -15. Manual NIF taken with NIFometer and best result was -20.  Results reported to APRN    Received PT intubated with a (_8.0 _) ETT secured at (_23_) on vent with the following settings; PS 10/5 35%  BS

## 2021-07-16 NOTE — PROGRESS NOTES
[facility]  [address] [facility], [address] [place] [address]  [phone]          INPATIENT NEUROLOGY   FOLLOW UP [facility]     Report of Consultation    Scott Lezama Patient Status:  Inpatient Neutrophil Abs      1.50 - 7.70 x10 (3) uL 9.06 (H)   Neutrophils Absolute      1.50 - 7.70 x10(3) uL 9.06 (H)   Lymphocytes Absolute      1.00 - 4.00 x10(3) uL 0.68 (L)   Monocytes Absolute      0.10 - 1.00 x10(3) uL 0.62   Eosinophils Absolute      0.00 examination have both improved.   He is awake and following commands, nodding yes or no to questions, able to follow commands in all 4 extremities including left upper extremity was able to flex his thumb.     –Acetylcholine receptor binding, blocking, stri have been corrected.  While every attempt is made to correct errors during dictation, discrepancies may still exist    Andie Garcia DO   Staff Neurologist   7/16/2021  7:02 AM

## 2021-07-16 NOTE — PLAN OF CARE
Pt more alert and reaching for ETT, restraints continued for patient safety.      Problem: Safety Risk - Non-Violent Restraints  Goal: Patient will remain free from self-harm  Description: INTERVENTIONS:  - Apply the least restrictive restraint to prevent h

## 2021-07-16 NOTE — PROGRESS NOTES
[facility] Dosing Service    Initial Pharmacokinetic Consult for Vancomycin AUC Dosing    Cha Upton is a 80year old patient who is being treated for pneumonia. Pharmacy has been asked to dose vancomycin by Dr. Steve Hampton.     Weights:  [place] body weight

## 2021-07-16 NOTE — PROGRESS NOTES
[facility]  Cardiology Progress Note    Manitowoc Kins Patient Status:  Inpatient    1932 MRN [id number]   Location [facility] 2W/SW Attending Colin Delgado,    Hosp Day # 11 PCP Boogie Heaton MD       Subjective sodium chloride 0.9% 100 mL IVPB-MBP) 200 200 --    Total Intake 3671.9 3892 120       Output    Urine  1460  975  --    Urine 300 -- --    Output (mL) (Urethral Catheter) 1160 975 --    Stool  250  50  --    Rectal Tube Output ([REMOVED] Rectal Tube) 250 07/14/21  0626 07/15/21  0413 07/16/21  0538   * 152* 141*   BUN 27* 32* 39*   CREATSERUM 0.64* 0.77 0.79   GFRAA 101 94 93   GFRNAA 87 81 80   CA 8.2* 7.6* 7.5*   * 143 141   K 5.1 5.5* 4.7    109 108   CO2 32.0 34.0* 30.0     Recent La pneumonia/atelectasis/asymmetric pulmonary edema changes. Correlate clinically and follow-up studies are advised. All tubes in good position with the feeding tube curling back upon itself slightly in the fundus of the stomach.      Dictated by (CST): Lamine Monore Room: 339 Accession: [id number] HT:(70in) WT:(160.7lb)                       BP: 155 / 70 ------------------------------------------------------------------- Indications:      Stroke, atrial fibrillation. -------------------------------------------- 23.1kg/m^2. BSA: 1.9m^2. Patient status: Inpatient. Study date:  Study date: 07/06/2021. Study time: 11:01 AM.  Location:  [facility]. ------------------------------------------------------------------- Findings Left ventricle:   The cavity size was elevated central venous pressure. ------------------------------------------------------------------- Measurements  Left ventricle                 Value        02/17/2018 Reference  LV ID, ED, PLAX                4.4   cm     ---------- 4.2 - 5.8  LV ID, E (H)     56    ml/m^2 ---------- 16 - 34   Mitral valve                   Value        02/17/2018 Reference  Mitral E-wave peak             75.5  cm/sec 30.8       -----------  velocity  Mitral deceleration            187   ms     120        -----------  ti neurology and pulmonology     HFrEF  - Echo with EF 20-25% with regional wall motion abnormalities  - Last PCI in 2005, unknown if had more recent ischemic evaluation  - Patient additionally has not been taking outpatient medications, has been depressed si

## 2021-07-16 NOTE — DIETARY NOTE
ADULT NUTRITION REASSESSMENT    Pt is at high nutrition risk. Pt meets moderate malnutrition criteria.       CRITERIA FOR MALNUTRITION DIAGNOSIS:  Criteria for non-severe malnutrition diagnosis: chronic illness related to energy intake less than75% for gre negligible. Discussed with RN.   7/12 Update: Day#4 Tube feeds at goal rate. Remains intubated. Neuro:  CVA vs myasthenia gravid with crisis. Dark stools- (iron was being held) possible GI bleed. (noted X 10 stools on 7/11). No stools prior to this.   MD chowdary **OR** Glucose-Vitamin C, dextrose, acetaminophen **OR** acetaminophen, Labetalol HCl, hydrALAzine HCl, acetaminophen, ondansetron HCl, LORazepam    LABS: reviewed Elevated BUN,normal creat- w/u for GI bleed in process. Hypernatremia- FWF increased.    Rece consume sufficient energy as evidenced by energy intake less than75% for greater than 1 month, body fat mild depletion and muscle mass mild depletion. NUTRITION DIAGNOSIS PROGRESS:  Improvement (unresolved) on tube feeds.          ESTIMATED NUTRITION NEEDS (A86683)

## 2021-07-17 NOTE — PROGRESS NOTES
[facility] Dosing Service    Follow-up Pharmacokinetic Consult for Vancomycin AUC Dosing     Nataly Aguiar is a 80year old patient who is being treated for pneumonia. Patient received vancomycin 1750 mg IV and first-dose AUC24 is now being calculated. Lab: [id number] Lab: [id number]

## 2021-07-17 NOTE — PROGRESS NOTES
[facility] HOSP - [facility]  Cardiology Progress Note    Ernesto Pizarro Patient Status:  Inpatient    1932 MRN [id number]   Location [facility] 2W/SW Attending Angela Stoddard, DO   Hosp Day # 12 PCP Charis Ceron MD       Subjective Output    Urine  975  1335  --    Output (mL) (Urethral Catheter) 975 1335 --    Stool  50  --  --    Stool Count Calculated for I/O -- 3 x --    Rectal Tube Output ([REMOVED] Rectal Tube) 50 -- --    Total Output 1025 1335 --       Net I/O     4009 41*   CREATSERUM 0.77 0.79 0.74   GFRAA 94 93 95   GFRNAA 81 80 82   CA 7.6* 7.5* 7.6*    141 143   K 5.5* 4.7 5.0    108 109   CO2 34.0* 30.0 29.0     Recent Labs   Lab 07/15/21  0413 07/16/21  0538 07/17/21  0538   RBC 3.31* 2.80* 2.64*   HGB studies are advised. All tubes in good position with the feeding tube curling back upon itself slightly in the fundus of the stomach.      Dictated by (CST): Evy Johnson MD on 7/16/2021 at 8:51 AM     Finalized by (CST): Evy Johnson MD on 7/16/2021 a increased in a pattern of mild LVH. Systolic function was    severely reduced. The estimated ejection fraction was 20-25%, by    visual assessment. Mild diffuse hypokinesis with distinct    regional wall motion abnormalities.  The study is not technically the mid anteroseptal, mid inferoseptal, and mid-apical inferior myocardium. The study is not technically sufficient to allow evaluation of LV diastolic function. Aortic valve:   Structurally normal valve. Trileaflet; normal thickened leaflets.   Doppler: end-diastolic               47    ml/m^2 ---------- 35 - 75  volume/bsa, Teichmigel  MM  LV e', lateral                 8.97  cm/sec 4.29       -----------  LV E/e', lateral               8.4          7.2        -----------  LV e', medial                  5 02/17/2018 Reference  RA volume, ES, A/L             85    ml     ---------- -----------  RA volume/bsa, ES, A/L (H)     44.9  ml/m^2 ---------- 11.0 - 39.0   Systemic veins                 Value        02/17/2018 Reference  Estimated CVP fibrillation  - Currently paced rhythm, asymptomatic  - Per primary discussions, patient is not interested in [address] given prior history of GIB     CAD s/p PCI  - Continue ASA, plavix, statin     Thank you for allowing me to participate in the care of your p

## 2021-07-17 NOTE — PLAN OF CARE
Restraints continued for patient's safety.     Problem: Safety Risk - Non-Violent Restraints  Goal: Patient will remain free from self-harm  Description: INTERVENTIONS:  - Apply the least restrictive restraint to prevent harm  - Notify patient and family of

## 2021-07-17 NOTE — RESPIRATORY THERAPY NOTE
Received PT intubated with a (_8.0 _) ETT secured at (_23_) on vent with the following settings; PS 10/5 35%  BS heard bilaterally, SXN provided as needed.  No changes made, RT to continue to monitor.

## 2021-07-17 NOTE — PROGRESS NOTES
[facility]    Progress Note    Yuri Chavez Patient Status:  Inpatient    1932 MRN [id number]   Location [facility] 2W/SW Attending Diane Acevedo DO   Hosp Day # 12 PCP Radha Krishna MD     HPI/Subjective:     Phylicia Delgado CHEST+ABDOMEN+PELVIS(ALL CNTRST ONLY)(CPT=71260/07693)    Result Date: 7/15/2021  CONCLUSION:   Extensive consolidative airspace opacities within the bilateral lower lobes with air bronchograms suspicious for pneumonia.   Extensive semi solid and ground-gla at 8:51 AM     Finalized by (CST): Landry Tanner MD on 7/16/2021 at 8:53 AM                Assessment & Plan:     1-Acute respiratory failure / intubated on vent :     –Aspiration pneumonia  - CVA   –? Myasthenic crisis  –Neuromuscular weakness     CXR :

## 2021-07-17 NOTE — PLAN OF CARE
Pt received at 1900. VSS. Intermittently following commands. Incontinent of stool overnight, see flowsheets. Bilateral wrist restraints remain in place for patient safety. Q2h turns performed. Pt comfort maintained.      Problem: Patient Centered Care  Goal Offer food and liquids at a slow rate  - No straws  - Encourage small bites of food and small sips of liquid  - Offer pills one at a time, crush or deliver with applesauce as needed  - Discontinue feeding and notify MD (or speech pathologist) if coughing o for any contributing factors to confusion (electrolyte disturbances, delirium, medications)  - Discontinue any unnecessary medical devices as soon as possible  - Assess the patient's physical comfort, circulation, skin condition, hydration, nutrition and e electrolytes and administer replacement therapy as ordered  Outcome: Progressing

## 2021-07-18 NOTE — PROGRESS NOTES
[facility]OSP - [facility]  Cardiology Progress Note    Shawano Kins Patient Status:  Inpatient    1932 MRN [id number]   Location [facility] 2W/SW Attending Colin Delgado, DO   Hosp Day # 15 PCP Boogie Heaton MD     Subjective Tube NG Right nare) 1347 1582 --    Total Intake 4708 7899 --       Output    Urine  1335  1725  --    Output (mL) (Urethral Catheter) 1335 1725 --    Stool  --  --  --    Stool Count Calculated for I/O 3 x 3 x --    Total Output 1335 1725 --       Net I/O Lab 07/16/21  0538 07/17/21  0538 07/18/21  0528   * 137* 131*   BUN 39* 41* 35*   CREATSERUM 0.79 0.74 0.71   GFRAA 93 95 97   GFRNAA 80 82 84   CA 7.5* 7.6* 7.5*    143 144   K 4.7 5.0 4.9    109 112   CO2 30.0 29.0 30.0     Recent L Jenna Hicks MD on 7/17/2021 at 8:48 AM          XR CHEST AP PORTABLE  (CPT=71045)    Result Date: 7/16/2021  CONCLUSION:  1.  Interval worsening in the right hemithorax with extensive airspace disease now developing suggesting either right-sided pneumonia or reduced. The estimated ejection fraction was 20-25%, by    visual assessment. Mild diffuse hypokinesis with distinct    regional wall motion abnormalities. The study is not technically    sufficient to allow evaluation of LV diastolic function.  2. Regional myocardium. The study is not technically sufficient to allow evaluation of LV diastolic function. Aortic valve:   Structurally normal valve. Trileaflet; normal thickened leaflets. Doppler: There was no stenosis. No regurgitation. Aorta:   Aortic root: volume/bsa, Teayad  MM  LV e', lateral                 8.97  cm/sec 4.29       -----------  LV E/e', lateral               8.4          7.2        -----------  LV e', medial                  5.65  cm/sec 6.82       -----------  LV E/e', medial ---------- -----------  RA volume/bsa, ES, A/L (H)     44.9  ml/m^2 ---------- 11.0 - 39.0   Systemic veins                 Value        02/17/2018 Reference  Estimated CVP                  8     mm Hg  3          -----------   Right ventricle is not interested in [address] given prior history of GIB     CAD s/p PCI  - Continue ASA, plavix, statin     Thank you for allowing me to participate in the care of your patient.     Marina Gutierres MD  [address] [place] [address]  7/18/2021

## 2021-07-18 NOTE — PLAN OF CARE
Bilateral soft wrist restraints remain in place at this time for patient safety. Skin integrity maintained. Will continue to reassess.      Problem: Patient Centered Care  Goal: Patient preferences are identified and integrated in the patient's plan of care food and small sips of liquid  - Offer pills one at a time, crush or deliver with applesauce as needed  - Discontinue feeding and notify MD (or speech pathologist) if coughing or persistent throat clearing or wet/gurgly vocal quality is noted  Outcome: Pro medications)  - Discontinue any unnecessary medical devices as soon as possible  - Assess the patient's physical comfort, circulation, skin condition, hydration, nutrition and elimination needs   - Reorient and redirection as needed  - Assess for the need

## 2021-07-18 NOTE — RESPIRATORY THERAPY NOTE
Pt recieved intubated with ETT size 8.0 @ 23 cm at the lip, on full vent support w/ the following vent settings: AC/VC 20/550/+5/35%. Pt is stable and tolerating well, suction as needed. No changes at this time, RT will continue to monitor.

## 2021-07-18 NOTE — PROGRESS NOTES
Intake/Output:      [facility] [address]  [address] [facility], [address] [place] [address]  [phone]        INPATIENT NEUROLOGY   FOLLOW UP PROGRESS NOTE  [address]  Patient Status:  Inpatient      atrium  –Hemoglobin A1c 5.0  –LDL 47     Concern is for seronegative myasthenia gravis exacerbation.  There may be a superimposed ischemic stroke leading to worsening left hemiparesis, or fluctuations in myasthenia. .      Concern for seronegative myastheni 07/17/21 1558 — — — 69 20 95 % —   07/17/21 1500 122/55 — — 75 20 97 % —   07/17/21 1439 — — — — — — 5' 10\" (1.778 m)   07/17/21 1400 111/52 — — 70 20 97 % —   07/17/21 1300 120/50 — — 77 18 97 % —   07/17/21 1200 108/57 98.8 °F (37.1 °C) Temporal 75 21 Subcutaneous 2 times per day   • Clopidogrel Bisulfate  75 mg Oral Daily   • ferrous sulfate  325 mg Oral Daily       Continuous Infusions:   • norepinephrine Stopped (07/09/21 1634)   • propofol Stopped (07/11/21 0730)   • dextrose            Results:   L

## 2021-07-19 NOTE — PLAN OF CARE
Received report at 1900. Patient alert and intubated. Patient opens eyes when I say his name. He follows commands at times. Weak hand , strength 3/5. Thick secretions from ET tube, frequent suctioning provided. Rivas and ramiro-care provided. Turn Q2.  Me oxygenation and minimize respiratory effort  - Oxygen supplementation based on oxygen saturation or ABGs  - Provide Smoking Cessation handout, if applicable  - Encourage broncho-pulmonary hygiene including cough, deep breathe, Incentive Spirometry  - Asses Monitor for bleeding, hypotension and signs of decreased cardiac output  - Evaluate effectiveness of vasoactive medications to optimize hemodynamic stability  - Monitor arterial and/or venous puncture sites for bleeding and/or hematoma  - Assess quality of

## 2021-07-19 NOTE — PROGRESS NOTES
INFECTIOUS DISEASE PROGRESS NOTE  [facility] ID PROGRESS NOTE    Paul Banuelos Patient Status:  Inpatient    1932 MRN [id number]   Location [facility] 2W/SW Attending Dakota Anderson Day # 14 PCP Da presents to hospital 7/5 from home with a fall, blurry vision, left-sided weakness with initial concern for CVA versus myasthenia gravis. Seen by neurology and noted to have generalized weakness, left-sided ptosis. Started on IVIG.  Worsening respiratory st

## 2021-07-19 NOTE — PROGRESS NOTES
[facility]    Progress Note      Assessment and Plan:     1. Respiratory failure–aspiration pneumonitis with suspected myasthenic crisis. Tolerated CPAP but the negative inspiratory force remains -10.  2 weeks to extubate today.   We will Neurologic less interactive today. Move his his extremities a little better with weaker handgrip.   Skin without gross abnormality     Results:     Lab Results   Component Value Date    HGB 7.9 07/19/2021    CREATSERUM 0.61 07/19/2021    BUN 38 07/19/202

## 2021-07-19 NOTE — IVS NOTE
Patient tolerated temporary dialysis catheter insertion without incident. Lidocaine 6mL subcutaneous given by Dr. Rivera Nagel. Catheter secured with biopatch and sorba view dressing. No bleeding or hematoma at site. Stat CXR ordered. Report to [facility].

## 2021-07-19 NOTE — PROGRESS NOTES
Called the patient's wife. Explained that the patient may have seronegative myasthenia gravis. Explained  potential tx w plex. Discussed some of the risks including the risk for hemorrhage/coagulopathy.  Discuss the risks to the best of this authors knowledg

## 2021-07-19 NOTE — CONSULTS
[facility]   Gastroenterology Consultation Note    Glendylarie Wyatt  Patient Status:    Inpatient  Date of Admission:         7/5/2021, Hospital day #14  Attending:   Donaldo Miller DO  PCP:     Paulina Villalobos MD    Reason for Consult History reviewed. No pertinent family history. reports that he quit smoking about 61 years ago. He has never used smokeless tobacco. He reports that he does not drink alcohol and does not use drugs.     Allergies:  No Known Allergies    Medications: rectal suppository 650 mg, 650 mg, Rectal, Q4H PRN  •  Labetalol HCl (TRANDATE) injection 10 mg, 10 mg, Intravenous, Q10 Min PRN  •  hydrALAzine HCl (APRESOLINE) injection 10 mg, 10 mg, Intravenous, Q2H PRN  •  aspirin 300 MG rectal suppository 300 mg, 300 7/19/2021 at 3:26 PM     Finalized by (CST): Gema Sams MD on 7/19/2021 at 3:29 PM          XR CHEST AP PORTABLE  (CPT=71045)    Result Date: 7/19/2021  CONCLUSION:  1. Bilateral pulmonary opacification with further slight improvement on the right. the carotid arteries bilaterally. 3. Findings suggesting right-sided otitis media; correlate with otoscopic exam. 4. Lesser incidental findings as above.    Dictated by (CST): Clayton Cadena MD on 7/19/2021 at 11:08 AM     Finalized by (CST): Sagrario Morton, supportive measures  -see above    This case was d/w Dr. Sarkis Rhodes (GI-on call) + Dr. Keagan Washburn and RN. Thank you for the opportunity to participate in the care of this patient.     [id number] [place] [address] Gastroenterology  7/19/2021

## 2021-07-19 NOTE — H&P (VIEW-ONLY)
[facility]   Gastroenterology Consultation Note    Scott Lezama  Patient Status:    Inpatient  Date of Admission:         7/5/2021, Hospital day #14  Attending:   Jolene Crawford DO  PCP:     Christiano Silva MD    Reason for Consult History reviewed. No pertinent family history. reports that he quit smoking about 61 years ago. He has never used smokeless tobacco. He reports that he does not drink alcohol and does not use drugs.     Allergies:  No Known Allergies    Medications: rectal suppository 650 mg, 650 mg, Rectal, Q4H PRN  •  Labetalol HCl (TRANDATE) injection 10 mg, 10 mg, Intravenous, Q10 Min PRN  •  hydrALAzine HCl (APRESOLINE) injection 10 mg, 10 mg, Intravenous, Q2H PRN  •  aspirin 300 MG rectal suppository 300 mg, 300 7/19/2021 at 3:26 PM     Finalized by (CST): Jonathan Mendieta MD on 7/19/2021 at 3:29 PM          XR CHEST AP PORTABLE  (CPT=71045)    Result Date: 7/19/2021  CONCLUSION:  1. Bilateral pulmonary opacification with further slight improvement on the right. the carotid arteries bilaterally. 3. Findings suggesting right-sided otitis media; correlate with otoscopic exam. 4. Lesser incidental findings as above.    Dictated by (CST): Josias Barlow MD on 7/19/2021 at 11:08 AM     Finalized by (CST): [place], supportive measures  -see above    This case was d/w Dr. Ricky Yates (GI-on call) + Dr. Radha Morse and RN. Thank you for the opportunity to participate in the care of this patient.     [id number] [place] [address] Gastroenterology  7/19/2021

## 2021-07-19 NOTE — PROGRESS NOTES
[facility]  Cardiology Progress Note    Scott Lezama Patient Status:  Inpatient    1932 MRN [id number]   Location [facility] 2W/SW Attending Jolene Crawford, DO   Hosp Day # 14 PCP Christiano Silva MD     Subjective --    NG/GT  1072  1600  --    Tube Feeding Flushes (mL) 500 904 --    Intake (mL) (Feeding Tube NG Right nare) 2709 1526 --    IV PIGGYBACK  --  800  --    Volume (mL) (Meropenem (MERREM) 500 mg in sodium chloride 0.9% 100 mL IVPB-MBP) -- 300 --    Volume 128 (H) 07/19/2021    CA 7.0 (L) 07/19/2021    ALB 2.6 (L) 07/09/2021    ALKPHO 71 05/17/2021    BILT 0.5 05/17/2021    TP 7.0 05/17/2021    AST 14 (L) 05/17/2021    ALT 14 (L) 05/17/2021    TSH 2.110 06/19/2020    MG 3.1 (H) 07/09/2021    PHOS 4.1 07/09/2 (CCX=48243)    Result Date: 7/6/2021                    [facility]* -------------------------------------------------------------------      Transthoracic Echocardiography M-mode, complete 2D, complete spectral Doppler, color Doppler, a echocardiography. The study was technically limited due to body habitus. Scanning was performed from the parasternal, apical, and subcostal acoustic windows. Intravenous contrast (Definity) was administered.   Study completion:  The patient tolerated the pr regurgitation. Tricuspid valve:   Structurally normal valve. Mobility was not restricted. Doppler: There was no evidence for stenosis. Mild regurgitation. Pulmonary artery:   Normal pulmonary artery pressure.  Right atrium:  The atrium was moderatel Stroke index (SV/bsa),         25    ml/m^2 ---------- -----------  LVOT DP   Aorta                          Value        02/17/2018 Reference  Aortic root ID                 3.8   cm     ---------- &lt;4.1  Ascending aorta ID,            3.7   cm     --- murmur, pericardial rub, S3, or extra cardiac sounds. Lungs: Diffuse rhonchi.  Intubated. Abdomen: Soft, non-tender. No organosplenomegally, mass or rebound, BS-present.   Extremities: Without clubbing, cyanosis or edema.    Neurologic: Moves all 4 extrem

## 2021-07-19 NOTE — RESPIRATORY THERAPY NOTE
Pt received on full vent support with settings AC 20/550/35% /+5. Intubated with 8.0 ett 23cm at the lips. Pt suctioned as needed. Josephine diminished/ sl coarse bilateral breath sounds. No vent changes made overnight.  RT will continue to monitor pt.     0600

## 2021-07-19 NOTE — PROGRESS NOTES
[address] [facility] Dosing Service    Follow-up Pharmacokinetic Consult for Vancomycin Dosing     Nataly Aguiar is a 80year old patient who is being treated for pneumonia. Patient is on day 4 of vancomycin and is currently receiving 1750 mg Q 18 hours.     Labs:

## 2021-07-20 NOTE — PLAN OF CARE
Problem: Patient Centered Care  Goal: Patient preferences are identified and integrated in the patient's plan of care  Description: Interventions:  - What would you like us to know as we care for you?  My wife has MS, and I can no longer take care of her, (or speech pathologist) if coughing or persistent throat clearing or wet/gurgly vocal quality is noted  Outcome: Progressing     Problem: Patient/Family Goals  Goal: Patient/Family Long Term Goal  Description: Patient's Long Term Goal: return home    Inter condition, hydration, nutrition and elimination needs   - Reorient and redirection as needed  - Assess for the need to continue restraints  7/20/2021 0626 by Sada Bennett, RN  Outcome: Progressing  7/19/2021 2104 by Sada Bennett, RN  Outcome: Amilcar Monzon trauma/injury; tube feedings off; plan for HD catheter, possible EGD. Will continue current plan of care.

## 2021-07-20 NOTE — PLAN OF CARE
Bilateral soft wrist restraints were continued due to patient being at risk for discontinuing medical equipment. Patient had EGD done at bedside today with no active bleeding noted. Plasmapheresis was done with patient tolerating the procedure well.  Dottie to optimize cerebral perfusion and minimize risk of hemorrhage  - Monitor temperature, glucose, and sodium.  Initiate appropriate interventions as ordered  Outcome: Progressing  Goal: Achieves maximal functionality and self care  Description: INTERVENTIONS and minimize respiratory effort  - Oxygen supplementation based on oxygen saturation or ABGs  - Provide Smoking Cessation handout, if applicable  - Encourage broncho-pulmonary hygiene including cough, deep breathe, Incentive Spirometry  - Assess the need f replacement therapy as ordered  Outcome: Progressing

## 2021-07-20 NOTE — PROGRESS NOTES
[facility] - [facility]  Cardiology Progress Note    Raphael Griffin Patient Status:  Inpatient    1932 MRN [id number]   Location [facility] 2W/SW Attending Socorro Canseco,    Hosp Day # 15 PCP Zack Fisher MD     Subjective -- 1687 --    Blood  287.5  --  --    Volume (Transfuse RBC) 287.5 -- --    NG/GT  2430  0  --    Tube Feeding Flushes (mL) 904 -- --    Intake (mL) (Feeding Tube NG Right nare) 1526 0 --    IV PIGGYBACK  800  550  --    Volume (mL) (Meropenem (MERREM) 500 07/20/2021    .0 07/20/2021    CREATSERUM 0.56 (L) 07/20/2021    BUN 29 (H) 07/20/2021     07/20/2021    K 4.4 07/20/2021     07/20/2021    CO2 27.0 07/20/2021     (H) 07/20/2021    CA 7.1 (L) 07/20/2021    ALB 2.6 (L) 07/09/2021 Pawel Paz MD on 7/19/2021 at 8:03 AM     Finalized by (CST): Pawel Paz MD on 7/19/2021 at 8:06 AM          CTA BRAIN (CPT=70496)    Result Date: 7/19/2021  CONCLUSION:  1.  Complete occlusion of the distal intracranial aspect of [facility]* -------------------------------------------------------------------      Transthoracic Echocardiography M-mode, complete 2D, complete spectral Doppler, color Doppler, and IV contrast -------------------------------- body habitus. Scanning was performed from the parasternal, apical, and subcostal acoustic windows. Intravenous contrast (Definity) was administered. Study completion:  The patient tolerated the procedure well. There were no complications.  Transthoracic ec was not restricted. Doppler: There was no evidence for stenosis. Mild regurgitation. Pulmonary artery:   Normal pulmonary artery pressure. Right atrium:  The atrium was moderately dilated. Pericardium:  There was no pericardial effusion.  Quality of s LVOT DP   Aorta                          Value        02/17/2018 Reference  Aortic root ID                 3.8   cm     ---------- &lt;4.1  Ascending aorta ID,            3.7   cm     ---------- 2.2 - 3.8  A-P, S   Left atrium                    Value rhonchi.  Intubated. Abdomen: Soft, non-tender. No organosplenomegally, mass or rebound, BS-present. Extremities: Without clubbing, cyanosis or edema.    Neurologic: Moves all 4 extremities.   Skin: Warm and dry.     Assessment and Plan:   Dyspnea  - Sero

## 2021-07-20 NOTE — PROGRESS NOTES
[facility]     Gastroenterology Progress Note    Aureliano Rear Patient Status:  Inpatient    1932 MRN [id number]   Location [facility] 2W/SW Attending Tree Randhawa, [address] [facility] Day # 15 PCP Heide Loya MD include chronic disease however in the setting of known erosive esophagitis, cannot exclude UGI bleed with dark stools. His Hgb downtrended with ongoing dark stools, although BUN improved today. Recommend bedside EGD - tube feedings have been held.     Daren 05/17/2021       XR CHEST AP PORTABLE  (CPT=71045)    Result Date: 7/19/2021  CONCLUSION:   Interval placement of right jugular central venous catheter with the tip in the lower SVC. No evidence of pneumothorax. No other significant interval change.     D otoscopic exam. 4. Lesser incidental findings as above.    Dictated by (CST): Josias Barlow MD on 7/19/2021 at 11:08 AM     Finalized by (CST): Josias Barlow MD on 7/19/2021 at 11:18 AM          IR NON-TUNNELED CATHETER    Result Date: 7/20/2021

## 2021-07-20 NOTE — PROGRESS NOTES
[facility]    Progress Note      Assessment and Plan:     1. Respiratory failure–aspiration pneumonitis with suspected myasthenic crisis. I spoke to the wife at length yesterday. I also spoke to the son Norberto Christine.   The patient still has bi hepatosplenomegaly and no mass appreciable. Extremities without clubbing cyanosis nor edema. Neurologic less interactive today. Move his his extremities a little better with weaker handgrip.   Skin without gross abnormality     Results:     Lab Results

## 2021-07-20 NOTE — CONSULTS
[facility]    Report of Consultation    Date of Admission:  7/5/2021  Date of Consult:  7/20/2021   Reason for Consultation:     Plasmapharesis     History of Present Illness:     Patient is a 80year old male with pmh of HTN, HL, CAD s/p P daily  Pantoprazole Sodium (PROTONIX) 40 mg in Sodium Chloride (PF) 0.9 % 10 mL IV push, 40 mg, Intravenous, Q12H  vancomycin IVPB premix 1.5g in 0.9% NaCl 250 mL, 1,500 mg, Intravenous, Q18H  dextrose 10 % infusion, , Intravenous, Continuous PRN  vancomyc Subcutaneous, 2 times per day  acetaminophen (TYLENOL) tab 650 mg, 650 mg, Oral, Q6H PRN  ondansetron HCl (ZOFRAN) injection 4 mg, 4 mg, Intravenous, Q6H PRN  ferrous sulfate EC tab 325 mg, 325 mg, Oral, Daily  LORazepam (ATIVAN) tab 0.5 mg, 0.5 mg, Oral, atraumatic  Eyes: conjunctivae/corneas clear  Throat: lips, mucosa, and tongue normal; teeth and gums normal  Neck:  no JVD, supple, symmetrical  Pulmonary:  clear to auscultation bilaterally  Cardiovascular: S1, S2 normal,  Abdominal: soft, non-tender; no

## 2021-07-20 NOTE — PLAN OF CARE
Problem: Patient Centered Care  Goal: Patient preferences are identified and integrated in the patient's plan of care  Description: Interventions:  - What would you like us to know as we care for you?  My wife has MS, and I can no longer take care of her, risk  Description: Interventions:  - Patient should be alert and upright for all feedings (90 degrees preferred)  - Offer food and liquids at a slow rate  - No straws  - Encourage small bites of food and small sips of liquid  - Offer pills one at a time, c instruct to report SOB or any respiratory difficulty  - Respiratory Therapy support as indicated  - Manage/alleviate anxiety  - Monitor for signs/symptoms of CO2 retention  7/19/2021 1925 by Damon Ellis RN  Outcome: Progressing  7/19/2021 0806 by Monitor arterial and/or venous puncture sites for bleeding and/or hematoma  - Assess quality of pulses, skin color and temperature  - Assess for signs of decreased coronary artery perfusion - ex.  Angina  - Evaluate fluid balance, assess for edema, trend we

## 2021-07-20 NOTE — OPERATIVE REPORT
ESOPHAGOGASTRODUODENOSCOPY (EGD) REPORT    Ras Jones    MARTHA 1932 Age 80year old   PCP Negin Bower MD Endoscopist Zarina Kruse MD     Date of procedure: 21    Procedure: EGD    Pre-operative diagnosis: melena    Post-operative diagnosis: fortino appeared normal with a non-patulous cardia. No hiatal hernia seen. 3. Duodenum: There was a small erosion in the duodenal bulb, non-bleeding. Otherwise the duodenal mucosa appeared normal in the 1st and 2nd portion of the duodenum.  Bilious effluent in

## 2021-07-20 NOTE — DIETARY NOTE
ADULT NUTRITION REASSESSMENT    Pt is at high nutrition risk. Pt meets moderate malnutrition criteria.       CRITERIA FOR MALNUTRITION DIAGNOSIS:  Criteria for non-severe malnutrition diagnosis: chronic illness related to energy intake less than75% for gre blood in stool and for C. Diff toxin. Hypernatremia- calculated FWD: ~ 1 L. FWF increased to 200 ml q 4 hrs X 24 hrs and will adjust in future pending labs.  Will change tube feeds to  Non-fiber Osmolite 1.2 same rate (no fiber in view of potential GIB and HCl, glucose **OR** Glucose-Vitamin C **OR** dextrose **OR** glucose **OR** Glucose-Vitamin C, dextrose, acetaminophen **OR** acetaminophen, Labetalol HCl, hydrALAzine HCl, acetaminophen, ondansetron HCl, LORazepam    LABS: reviewed .    Recent Labs     07/ evidenced by energy intake less than75% for greater than 1 month, body fat mild depletion and muscle mass mild depletion. NUTRITION DIAGNOSIS PROGRESS:  Improvement (unresolved) on tube feeds.          ESTIMATED NUTRITION NEEDS: Dosing wt: 72.5 kg wt on 7/

## 2021-07-20 NOTE — INTERVAL H&P NOTE
Pre-op Diagnosis: anemia, black stools    The above referenced H&P was reviewed by Loree Barrera MD on 7/20/2021, the patient was examined and no significant changes have occurred in the patient's condition since the H&P was performed.   I discussed with gayathri

## 2021-07-21 NOTE — PROGRESS NOTES
[facility]     Cardiology Progress Note    Subjective:  Nodding head appropriately. Intubated. No sedation.     Objective:  /64 (BP Location: Left arm)   Pulse 70   Temp 96.9 °F (36.1 °C) (Temporal)   Resp 11   Ht 5' 10\" (1.778 m)   W failure/dyspnea  · cva vs myasthenia gravis  · HFrEF  · Aspiration pneumonia  · Chronic atrial fibrillation s/p ICD  · CAD s/p PCI  · Melanotic stools/GIB    Plan:  · Intubated on cpap weaning trial. Communicating as best as he can.  Nodding head appropriat

## 2021-07-21 NOTE — PROGRESS NOTES
[facility]  Nephrology Daily Progress Note    Hermilo Bravo  [id number]  80year old      HPI:   Hermilo Bravo is a 80year old male. Tolerated 1st plasmapheresis well yesterday. On CPAP now.   Weak and debilitated but will follow some sim 07/19/21  0332 07/20/21  0538 07/21/21  0512   * 115* 160*   BUN 38* 29* 24*   CREATSERUM 0.61* 0.56* 0.56*   GFRAA 103 107 107   GFRNAA 89 92 92   CA 7.0* 7.1* 6.9*   ALB  --   --  3.2*   * 143 142   K 4.8 4.4 4.6   * 112 111   CO2 29.0 itself slightly in the fundus of the stomach. Correlate clinically and follow-up studies are advised.     Dictated by (CST): Nelly Bernardo MD on 7/20/2021 at 11:07 AM     Finalized by (CST): Nelly Bernardo MD on 7/20/2021 at 11:11 AM          XR CHEST AP patent. 2. Mild calcific atherosclerosis in the left carotid bifurcation. No flow limiting stenosis or occlusion involving the carotid arteries bilaterally.  3. Findings suggesting right-sided otitis media; correlate with otoscopic exam. 4. Lesser incident tablet, Oral, Q15 Min PRN **OR** dextrose 50 % injection 50 mL, 50 mL, Intravenous, Q15 Min PRN **OR** glucose (DEX4) oral liquid 30 g, 30 g, Oral, Q15 Min PRN **OR** Glucose-Vitamin C (DEX-4) chewable tab 8 tablet, 8 tablet, Oral, Q15 Min PRN  •  Insulin Calcium low 6.9. Hb 7.9. Fibrinogen OK. Will arrange for plasmapheresis today. Give an additional 2 gms of Calcium gluconate now in addition to 4 gms during rx. Venofer for iron def.   Discussed with RN.            7/21/2021  Charlie Kellogg MD

## 2021-07-21 NOTE — CM/SW NOTE
Care Progression Note:  Active Acute Medical Issue:   Acute ischemic stroke (HCC) vs MG with crisis- pt on day #2 of plasmapheresis, s/p IVIG  Respiratory Failure- aspiration pneumonitis secondary to suspected myasthenic crisis.   Pt currently intubated, un

## 2021-07-21 NOTE — PLAN OF CARE
Ray's R arm is stronger than his L arm so both are in soft restraints. He was put on a PCCU bed to try to help keep his posture straighter, he still bends his neck to one side or the other.    If he is to have another Plasmapheresis treatment today, Elzbieta Curry Term Goal  Description: Patient's Long Term Goal: return home    Interventions:  - neurology consult  - PT eval  -OT eval  -Speech eval  -IVF  -ASA/Plavix  -2D echo  -CT head    - See additional Care Plan goals for specific interventions  Outcome: Progress RN  Outcome: Not Progressing  7/21/2021 0157 by Sandy Alamo RN  Outcome: Not Progressing     Problem: Diabetes/Glucose Control  Goal: Glucose maintained within prescribed range  Description: INTERVENTIONS:  - Monitor Blood Glucose as ordered  -

## 2021-07-21 NOTE — PROGRESS NOTES
INFECTIOUS DISEASE PROGRESS NOTE  [facility] ID PROGRESS NOTE    Tunica Kins Patient Status:  Inpatient    1932 MRN [id number]   Location [facility] 2W/SW Attending Maciej Sanchez Day # 16 PCP Gray a documented history of HTN, HLD, CAD, CHF with AICD now presents to hospital 7/5 from home with a fall, blurry vision, left-sided weakness with initial concern for CVA versus myasthenia gravis.  Seen by neurology and noted to have generalized weakness, lef

## 2021-07-21 NOTE — PROGRESS NOTES
[facility]     Gastroenterology Progress Note    Scott Lezama Patient Status:  Inpatient    1932 MRN [id number]   Location [facility] 2W/SW Attending Jolene Crawford, [address] [facility] Day # 16 PCP MD Christy Platt above    Case d/w  [facility] and RN at bedside. GI to sign off.     Bonnie Parikh, [facility], [facility] Gastroenterology  7/21/2021    Results:     Lab Results   Component Value Date    WBC 11.6 (H) 07/21/2021    HGB 7.9 (L) 07/21/2021    HCT 25.2 (L) 07/21/20 bilateral airspace disease involving the lung bases primarily in the bilateral parahilar regions which may be related to pulmonary congestion or bilateral pneumonia. Focal left base consolidation may suggest pneumonia and or atelectasis.   Mild blunting le

## 2021-07-21 NOTE — PROGRESS NOTES
[facility]  [address] [facility], [address] [place] [address]  [phone]        INPATIENT NEUROLOGY   FOLLOW UP PROGRESS NOTE  [address]  Patient Status:  Inpatient     1932 MRN [id number] atrium  –Hemoglobin A1c 5.0  –LDL 47     Concern is for seronegative myasthenia gravis exacerbation.  There may be a superimposed ischemic stroke leading to worsening left hemiparesis, or fluctuations in myasthenia.       1. ?  Neurogenic respiratory failur upper extremity has a weak handgrip and left upper extremity was able to flex thumb trying to handgrip. He is hands are in soft restraints but he was able to move them. Able to keep his forearms antigravity.   Sensory: Withdraws when his feet are tickled 07/07/2021    CK 37 (L) 07/05/2021    B12 343 05/17/2021     Recent Results (from the past 72 hour(s))   POCT Glucose    Collection Time: 07/18/21 12:25 AM   Result Value Ref Range    POC Glucose  163 (H) 70 - 99 mg/dL   Basic Metabolic Panel (8)    [facility] Collection Time: 07/18/21 10:36 AM   Result Value Ref Range    Antibody Screen Negative    Occult Blood Stool, Diagnostic    Collection Time: 07/18/21 11:44 AM    Specimen: Stool   Result Value Ref Range    Occult Blood Positive (A) Negative   POCT Glucose Range    POC Glucose  150 (H) 70 - 99 mg/dL   POCT Glucose    Collection Time: 07/19/21  1:11 PM   Result Value Ref Range    POC Glucose  134 (H) 70 - 99 mg/dL   Vancomycin Trough, Serum    Collection Time: 07/19/21  4:41 PM   Result Value Ref Range    Moose Weiss Total 7.1 (L) 8.5 - 10.1 mg/dL    Calculated Osmolality 303 (H) 275 - 295 mOsm/kg    GFR, Non- 92 >=60    GFR, -American 107 >=60   CBC W/ DIFFERENTIAL    Collection Time: 07/20/21  5:39 AM   Result Value Ref Range    WBC 8.7 4.0 - 1 pulmonary congestion. Moderate bilateral airspace disease involving the lung bases primarily in the bilateral parahilar regions which may be related to pulmonary congestion or bilateral pneumonia.   Focal left base consolidation may suggest pneumonia and o its origin, a severe stenosis at the level of the C2 transverse foramen, as well as complete occlusion of its V4 segment by soft plaque superimposed on moderate calcific atherosclerosis.   However, there is reconstitution of blood flow in the distal aspect

## 2021-07-21 NOTE — PLAN OF CARE
Manuel's wrists are in bilateral soft wrist restraints as he is still requiring the ET tube.     Problem: Safety Risk - Non-Violent Restraints  Goal: Patient will remain free from self-harm  Description: INTERVENTIONS:  - Apply the least restrictive restraint

## 2021-07-21 NOTE — PROGRESS NOTES
[facility]    Progress Note      Assessment and Plan:     1. Respiratory failure–aspiration pneumonitis with weakness secondary to presumed myasthenic crisis. The patient appears much stronger today.   He has been tolerating CPAP for days regular rate and rhythm no murmur. Abdomen nontender, without hepatosplenomegaly and no mass appreciable. Extremities without clubbing cyanosis nor edema. Neurologic much stronger today and much more expressive in the face.   Skin without gross abnorm

## 2021-07-22 NOTE — PLAN OF CARE
Extubated per MD order this morning and transitioned to High Flow Nasal cannula @ 6L, respiratory rate increased and SpO2 dropped to low 80s and patient was transitioned onto BiPAP per MD. Nods/Shakes head appropriately, moves all extremities but fatigues restrictive restraint to prevent harm  - Notify patient and family of reasons restraints applied  - Assess for any contributing factors to confusion (electrolyte disturbances, delirium, medications)  - Discontinue any unnecessary medical devices as soon as as ordered  Outcome: Progressing     Problem: NEUROLOGICAL - ADULT  Goal: Achieves maximal functionality and self care  Description: INTERVENTIONS  - Monitor swallowing and airway patency with patient fatigue and changes in neurological status  - Encourage support as indicated  - Manage/alleviate anxiety  - Monitor for signs/symptoms of CO2 retention  Outcome: Not Progressing

## 2021-07-22 NOTE — PROGRESS NOTES
[facility]  Cardiology Progress Note    Valerio Proper Patient Status:  Inpatient    1932 MRN [id number]   Location [facility] 2W/SW Attending Zelda Mendoza, DO   Hosp Day # 16 PCP Greta Vargas MD     Subjective sodium chloride 0.9% 100 mL IVPB-MBP) -- 200 --    Volume (mL) (vancomycin IVPB premix 1.5g in 0.9% NaCl 250 mL) -- 250 --    Volume (mL) (calcium gluconate 4 g in sodium chloride 0.9% 250 mL IVPB) -- 250 --    Volume (mL) (Albumin Human (ALBUMINAR) 5 % so  (H) 07/22/2021    CO2 23.0 07/22/2021    GLU 91 07/22/2021    CA 6.9 (L) 07/22/2021    ALB 3.3 (L) 07/22/2021    ALKPHO 52 07/22/2021    BILT 0.5 07/22/2021    TP 4.7 (L) 07/22/2021    AST 43 (H) 07/22/2021    ALT 47 07/22/2021    TSH 2.110 06/19/20 (CST): Bella Phan MD on 7/21/2021 at 7:47 AM          XR CHEST AP PORTABLE  (CPT=71045)    Result Date: 7/20/2021  CONCLUSION:   Interval placement of Dobbhoff tube with tip over the central body of the stomach.   No significant interval change in cardi flow limiting stenosis or occlusion involving the anterior intracranial circulation.    Dictated by (CST): Pascale Curry MD on 7/19/2021 at 10:43 AM     Finalized by (CST): Pascale Curry MD on 7/19/2021 at 10:52 AM          CTA CAROTID ARTERIES (C E: [id number] Study date: Jul 6 2021 11:01AM               Room: [facility] Accession: [id number] HT:(70in) WT:(160.7lb)                       BP: 155 / 70 ------------------------------------------------------------------- Indications: Patient YOB: 1932. Patient is 88yr old. Gender: male. BMI: 23.1kg/m^2. BSA: 1.9m^2. Patient status: Inpatient. Study date:  Study date: 07/06/2021. Study time: 11:01 AM.  Location:  Echo laboratory.  ------------------------------------------- diameter changes were in the normal range (= 50%), consistent with elevated central venous pressure. ------------------------------------------------------------------- Measurements  Left ventricle                 Value        02/17/2018 Reference  LV ID, 107   ml     ---------- -----------  LA volume/bsa, ES, 2-p (H)     56    ml/m^2 ---------- 16 - 34   Mitral valve                   Value        02/17/2018 Reference  Mitral E-wave peak             75.5  cm/sec 30.8       -----------  velocity  Mitr improvement in strength  - Remains intubated, CPAP trials  - Further management per neurology and pulmonology     HFrEF  - Echo with EF 20-25% with regional wall motion abnormalities  - Last PCI in 2005, unknown if had more recent ischemic evaluation  - Pa

## 2021-07-22 NOTE — RESPIRATORY THERAPY NOTE
PT RECEIVED ON THE  VENT  WITH  ETT SIZE 8.0 AT THE LIPS. AND SETTINGS CPAP 5 / PS 10 / FIO2  35%. SX DONE    AND CONTINUE TO MONITOR THE PT

## 2021-07-22 NOTE — PLAN OF CARE
Opal Manzodmont has still required the bilateral soft wrist restraints. He is stronger this shift than last night's shift with regard to moving his upper and lower extremities. He did not sleep well at all over night despite encouragement.   .   Problem: Patient Ja consult  - PT eval  -OT eval  -Speech eval  -IVF  -ASA/Plavix  -2D echo  -CT head    - See additional Care Plan goals for specific interventions  Outcome: Progressing  Goal: Patient/Family Short Term Goal  Description: Patient's Short Term Goal: get strong

## 2021-07-22 NOTE — PROGRESS NOTES
[facility]    Progress Note      Subjective:     Patient remain awake and alert.  NIF 14     Remain intubated - on CPAP overnight       Review of Systems:     Unable to obtain     Objective:   Temp:  [96.9 °F (36.1 °C)-98.3 °F (36.8 °C)] 9 Q8H PRN  norepinephrine (LEVOPHED) 4 mg/250 ml premix infusion, 0.5-30 mcg/min, Intravenous, Continuous  propofol (DIPRIVAN) infusion, 5-100 mcg/kg/min (Dosing Weight), Intravenous, Continuous  glucose (DEX4) oral liquid 15 g, 15 g, Oral, Q15 Min PRN   Or 0.56* 0.45*   GFRAA 107 107 117   GFRNAA 92 92 101   CA 7.1* 6.9* 6.9*   ALB  --  3.2* 3.3*    142 144   K 4.4 4.6 4.6    111 117*   CO2 27.0 25.0 23.0   ALKPHO  --  62 52   AST  --  73* 43*   ALT  --  74* 47   BILT  --  0.6 0.5   TP  --  5.0* Seronegative myasthenia Gravis :  - s/p IVIG treatments for myasthenia gravis   - neurology recommended for plasmapheresis   - s/p 2 treatments - first on 7/20. - will hold rx today and do another treatment tomm   - improve motor strength noted   - low fib

## 2021-07-23 NOTE — DIETARY NOTE
ADULT NUTRITION REASSESSMENT    Pt is at high nutrition risk. Pt meets moderate malnutrition criteria.       CRITERIA FOR MALNUTRITION DIAGNOSIS:  Criteria for non-severe malnutrition diagnosis: chronic illness related to energy intake less than75% for gre prior to this. MD checking for blood in stool and for C. Diff toxin. Hypernatremia- calculated FWD: ~ 1 L. FWF increased to 200 ml q 4 hrs X 24 hrs and will adjust in future pending labs.  Will change tube feeds to  Non-fiber Osmolite 1.2 same rate (no fib mg Oral Daily   • aspirin  300 mg Rectal Daily    Or   • aspirin  325 mg Oral Daily   • atorvastatin  40 mg Oral Nightly   • Heparin Sodium (Porcine)  5,000 Units Subcutaneous 2 times per day   • ferrous sulfate  325 mg Oral Daily   Prn: dextrose, Metoclop (163 lb)  11/17/20 : 76.7 kg (169 lb)  09/14/20 : 76.2 kg (168 lb)  02/24/20 : 76.7 kg (169 lb)  03/26/19 : 76.9 kg (169 lb 8 oz)  06/19/18 : 77.8 kg (171 lb 8 oz)  02/21/18 : 78.5 kg (173 lb)  06/01/17 : 78.2 kg (172 lb 6.4 oz)    NUTRITION DIAGNOSIS/PROB appropriate. - Anthropometric Measurement:      Monitor weight  - Nutrition Goals:      allow wt loss s/t fluid loss, labs WNL, prevent skin breakdown, EN to provide >80% of nutrition need.       DIETITIAN FOLLOW UP: RD to follow and monitor nutrition sta

## 2021-07-23 NOTE — SPIRITUAL CARE NOTE
Visited while rounding, pt not alert. No family at bedside. Left card. Chaplains are available 24 hours a day at [phone]. Rev. Chris Khan [facility]  Ext. [phone]

## 2021-07-23 NOTE — PROGRESS NOTES
[facility]    Progress Note      Subjective:     Extubated yesterday - on high flow NC     duboff in place     Limited responsiveness       Review of Systems:     Limited.  Denies any NV, cp or abdominal pain     Objective:   Temp:  [97 °F (MERREM) 500 mg in sodium chloride 0.9% 100 mL IVPB-MBP, 500 mg, Intravenous, Q8H  sodium chloride 0.9% IV bolus 500 mL, 500 mL, Intravenous, Once  Metoclopramide HCl (REGLAN) injection 10 mg, 10 mg, Intravenous, Q8H PRN  norepinephrine (LEVOPHED) 4 mg/250 92.0 94.4   NEPRELIM 9.98* 7.79* 8.18*   WBC 11.6* 9.3 9.6   .0 299.0 331.0     Recent Labs   Lab 07/21/21  0512 07/22/21  0636 07/23/21  0502   * 91 139*   BUN 24* 22* 19*   CREATSERUM 0.56* 0.45* 0.43*   GFRAA 107 117 119   GFRNAA 92 101 10

## 2021-07-23 NOTE — PLAN OF CARE
Problem: Patient Centered Care  Goal: Patient preferences are identified and integrated in the patient's plan of care  Description: Interventions:  - What would you like us to know as we care for you?  My wife has MS, and I can no longer take care of her, (or speech pathologist) if coughing or persistent throat clearing or wet/gurgly vocal quality is noted  Outcome: Progressing     Problem: Patient/Family Goals  Goal: Patient/Family Long Term Goal  Description: Patient's Long Term Goal: return home    Inter CARDIOVASCULAR - ADULT  Goal: Maintains optimal cardiac output and hemodynamic stability  Description: INTERVENTIONS:  - Monitor vital signs, rhythm, and trends  - Monitor for bleeding, hypotension and signs of decreased cardiac output  - Evaluate effectiv

## 2021-07-23 NOTE — RESPIRATORY THERAPY NOTE
Patient received on 10 lpm high flow cannula. Dr. Marian Garcia was bedside, received order vest and cough assist Tid. Patient was desaturated to 87%,  placed on NIV with RR 10, PS 12, +5, 60%. BS heard B/L course and congested.

## 2021-07-23 NOTE — PLAN OF CARE
Rosendo Metcalf is alert, oriented to self, able to state the year with confusion on the month, place, and situation. Vital signs as charted. Alternating Bipap and HFNC as tolerated. Third plasmapheresis treatment done today.  Diuresis started with lasix, lucio out aphasic patients to use assistive/communication devices  Outcome: Progressing     Problem: Impaired Swallowing  Goal: Minimize aspiration risk  Description: Interventions:  - Patient should be alert and upright for all feedings (90 degrees preferred)  - Of Progressing     Problem: Diabetes/Glucose Control  Goal: Glucose maintained within prescribed range  Description: INTERVENTIONS:  - Monitor Blood Glucose as ordered  - Assess for signs and symptoms of hyperglycemia and hypoglycemia  - Administer ordered me

## 2021-07-23 NOTE — PROGRESS NOTES
[facility]  Cardiology Progress Note    Good Silva Patient Status:  Inpatient    1932 MRN [id number]   Location [facility] 2W/SW Attending Dar Gee, DO   Hosp Day # 18 PCP Selvin Gleason MD     Subjective chloride 0.9% 100 mL IVPB-MBP) 200 -- --    Volume (mL) (vancomycin IVPB premix 1.5g in 0.9% NaCl 250 mL) 250 -- --    Volume (mL) (calcium gluconate 4 g in sodium chloride 0.9% 250 mL IVPB) 250 -- --    Volume (mL) (Albumin Human (ALBUMINAR) 5 % solution 07/23/2021    HCT 23.5 (L) 07/23/2021    .0 07/23/2021    CREATSERUM 0.43 (L) 07/23/2021    BUN 19 (H) 07/23/2021     07/23/2021    K 4.9 07/23/2021     (H) 07/23/2021    CO2 25.0 07/23/2021     (H) 07/23/2021    CA 7.5 (L) 07/23/ borderline cardiomegaly with patchy bilateral perihilar lung opacity which may reflect edema. Increased left basilar opacity with small left pleural effusion. Stable appearance of support devices.     Dictated by (CST): Zion Hendrickson MD on 7/22/2021 a normal. Wall thickness was    increased in a pattern of mild LVH. Systolic function was    severely reduced. The estimated ejection fraction was 20-25%, by    visual assessment. Mild diffuse hypokinesis with distinct    regional wall motion abnormalities. myocardium; severe hypokinesis of the mid anteroseptal, mid inferoseptal, and mid-apical inferior myocardium. The study is not technically sufficient to allow evaluation of LV diastolic function. Aortic valve:   Structurally normal valve.  Trileaflet; winter 67 - 155  volume, Teichholz MM  LV end-diastolic               47    ml/m^2 ---------- 35 - 75  volume/bsa, Teichholz  MM  LV e', lateral                 8.97  cm/sec 4.29       -----------  LV E/e', lateral               8.4          7.2        ---------- Value        02/17/2018 Reference  RA volume, ES, A/L             85    ml     ---------- -----------  RA volume/bsa, ES, A/L (H)     44.9  ml/m^2 ---------- 11.0 - 39.0   Systemic veins                 Value        02/17/2018 Reference per pulmonology     Chronic atrial fibrillation  - Currently paced rhythm, asymptomatic  - Per primary discussions, patient is not interested in [address] given prior history of GIB     CAD s/p PCI  - Continue ASA  - Hold plavix given melanotic stools  - s/p EG

## 2021-07-23 NOTE — PROGRESS NOTES
Pulmonary/Critical Care Follow Up Note    HPI:   Arnoldo Adjutant is a 80year old male with Patient presents with:  Fall  Hip Pain      PCP Oli Nguyen MD  Admission Attending Valentin Reilly 15 Day #18    C/o cough  Notes sob  Off NIV this AM mcg/kg/min (Dosing Weight), Intravenous, Continuous  •  glucose (DEX4) oral liquid 15 g, 15 g, Oral, Q15 Min PRN **OR** Glucose-Vitamin C (DEX-4) chewable tab 4 tablet, 4 tablet, Oral, Q15 Min PRN **OR** dextrose 50 % injection 50 mL, 50 mL, Intravenous, Q effort  cv reg nl s1s2   abd soft NT/ND + bs  Ext warm      LABS:    Lab Results   Component Value Date    WBC 9.6 07/23/2021    HGB 7.3 07/23/2021    HCT 23.5 07/23/2021    .0 07/23/2021    CREATSERUM 0.43 07/23/2021    BUN 19 07/23/2021

## 2021-07-23 NOTE — RESPIRATORY THERAPY NOTE
Patient remains on NIV overnight. Appears to be tolerating well. No changes at this time. BS heard B/L. Maintaining appropriate sats/Vt. RT will continue to monitor.

## 2021-07-24 NOTE — PROGRESS NOTES
Pt seen late this evening  Extubated on 7/22; 3rd session of plex today.   No fatigability ;voice is wet    plex 4/5 tomorrow    Full pn to follow  Bella Salazar,   Staff Vascular & General Neurology

## 2021-07-24 NOTE — PROGRESS NOTES
[facility]    Progress Note      Assessment and Plan:     1. Respiratory failure–aspiration pneumonitis with weakness secondary to presumed myasthenic crisis. The patient was extubated and has tolerated extubation.   His strength is fluctu equal round and reactive to light and accommodation. Neck without adenopathy, thyromegaly, JVD nor bruit. Lungs diminished at right base to auscultation and percussion. Cardiac regular rate and rhythm no murmur.    Abdomen nontender, without hepatosple

## 2021-07-24 NOTE — PROGRESS NOTES
[facility] - [facility]    Progress Note      Subjective:     Remain on high flow NC     Increase responsiveness but still appear lethargic     S/p plasmapheresis 3rd session yesterday       Review of Systems:     Limited.  Denies any NV, cp or abdomin 0.5-30 mcg/min, Intravenous, Continuous  propofol (DIPRIVAN) infusion, 5-100 mcg/kg/min (Dosing Weight), Intravenous, Continuous  glucose (DEX4) oral liquid 15 g, 15 g, Oral, Q15 Min PRN   Or  Glucose-Vitamin C (DEX-4) chewable tab 4 tablet, 4 tablet, Oral 0.48*   GFRAA 107   < > 117 119 114   GFRNAA 92   < > 101 103 98   CA 6.9*   < > 6.9* 7.5* 7.4*   ALB 3.2*  --  3.3*  --   --       < > 144 145 145   K 4.6   < > 4.6 4.9 5.1      < > 117* 117* 115*   CO2 25.0   < > 23.0 25.0 26.0   ALKPHO 62  - catheter  - ionized calcium wnl  - Hgb stable - EGD showed mild erosions .  Off of plavix     2. HFrEF / BiV ICD/CAD s/p PCI    - EF 20-25% with regional WMA  - CXR looks pneumonia / wet - s/p IV lasix yesterday and now on 40 mg BID dose   - monitor renal f

## 2021-07-24 NOTE — PLAN OF CARE
Patient arousable to painful stimulus, not following commands, desatted this afternoon & was placed back on BIPAP. Dr. Kane Ferrell aware of changes to neuro & resp status, no ABG at this time, just continue to monitor patient.       Problem: Patient Centered Care preferred)  - Offer food and liquids at a slow rate  - No straws  - Encourage small bites of food and small sips of liquid  - Offer pills one at a time, crush or deliver with applesauce as needed  - Discontinue feeding and notify MD (or speech pathologist) perfusion - ex.  Angina  - Evaluate fluid balance, assess for edema, trend weights  Outcome: Progressing  Goal: Absence of cardiac arrhythmias or at baseline  Description: INTERVENTIONS:  - Continuous cardiac monitoring, monitor vital signs, obtain 12 lead

## 2021-07-24 NOTE — PLAN OF CARE
Problem: Patient Centered Care  Goal: Patient preferences are identified and integrated in the patient's plan of care  Description: Interventions:  - What would you like us to know as we care for you?  My wife has MS, and I can no longer take care of her, (or speech pathologist) if coughing or persistent throat clearing or wet/gurgly vocal quality is noted  Outcome: Progressing     Problem: RESPIRATORY - ADULT  Goal: Achieves optimal ventilation and oxygenation  Description: INTERVENTIONS:  - Assess for reji Progressing  Goal: Absence of cardiac arrhythmias or at baseline  Description: INTERVENTIONS:  - Continuous cardiac monitoring, monitor vital signs, obtain 12 lead EKG if indicated  - Evaluate effectiveness of antiarrhythmic and heart rate control medicati

## 2021-07-24 NOTE — PROGRESS NOTES
[address]  [address] [facility], [address] [place] [address]  [phone]        INPATIENT NEUROLOGY   FOLLOW UP PROGRESS NOTE  [address]  Patient Status:  Inpatient     1932 MRN [id number] to 25%, akinesis of apical myocardium, severe dilation of left atrium and moderate dilation of right atrium  –Hemoglobin A1c 5.0  –LDL 47     Concern is for seronegative myasthenia gravis exacerbation.  There may be a superimposed ischemic stroke leading t atrophy or fasiculations of the tongue noted. - Motor:  normal tone, normal bulk. No interosseous wasting. No flattening of hypothenar eminences.        Right Left     Motor Strength    3 3  Knee extensors 3 3  - Sensory:   Light touch:normal   - Cereb 43 (H) 07/22/2021    ALT 47 07/22/2021    TSH 2.110 06/19/2020    MG 2.5 07/22/2021    PHOS 2.5 07/23/2021    TROP <0.045 07/07/2021    CK 37 (L) 07/05/2021    B12 343 05/17/2021     Recent Results (from the past 72 hour(s))   POCT Glucose    Collection Ti (H) 4.0 - 11.0 x10(3) uL    RBC 2.68 (L) 3.80 - 5.80 x10(6)uL    HGB 7.9 (L) 13.0 - 17.5 g/dL    HCT 25.2 (L) 39.0 - 53.0 %    MCV 94.0 80.0 - 100.0 fL    MCH 29.5 26.0 - 34.0 pg    MCHC 31.3 31.0 - 37.0 g/dL    RDW-SD 56.3 (H) 35.1 - 46.3 fL    RDW 16.9 ( 117 >=60    ALT 47 16 - 61 U/L    AST 43 (H) 15 - 37 U/L    Alkaline Phosphatase 52 45 - 117 U/L    Bilirubin, Total 0.5 0.1 - 2.0 mg/dL    Total Protein 4.7 (L) 6.4 - 8.2 g/dL    Albumin 3.3 (L) 3.4 - 5.0 g/dL    Globulin  1.4 (L) 2.8 - 4.4 g/dL    A/G Ra mmol/L    ABG O2 Saturation >99.0 94.0 - 100.0 %    Oxygen Content 11.7 (L) 15.0 - 23.0 Vol%    Oxygen Delivery Device CPAP/BIPAP     Blood Gas Vent Mode A/C     Blood Gas Respiration Rate 10 BPM    FIO2 80.00     PEEP 5.0 cm H2O    Modified Allens Test Po Neutrophil Absolute Prelim 8.18 (H) 1.50 - 7.70 x10 (3) uL    Neutrophil Absolute 8.18 (H) 1.50 - 7.70 x10(3) uL    Lymphocyte Absolute 0.62 (L) 1.00 - 4.00 x10(3) uL    Monocyte Absolute 0.55 0.10 - 1.00 x10(3) uL    Eosinophil Absolute 0.14 0.00 - 0.70 x on 7/23/2021 at 9:50 AM     Finalized by (CST): Farzaneh Tony MD on 7/23/2021 at 10:02 AM          XR CHEST AP PORTABLE  (CPT=71045)    Result Date: 7/22/2021  CONCLUSION:   Stable borderline cardiomegaly with patchy bilateral perihilar lung opacity which

## 2021-07-24 NOTE — PROGRESS NOTES
Cardiology Progress Note    Yinka Zakiya Patient Status:  Inpatient    1932 MRN [id number]   Location [facility] 2W/SW Attending Ben Kendrick   Hosp Day # 23 PCP Laurence Marin MD     80year-old male admitted  with symptom systems reviewed, pertinent findings above. Review of Systems   All other systems reviewed and are negative.       History:  Past Medical History:   Diagnosis Date   • Anemia, iron deficiency    • Anxiety state    • Arrhythmia    • Atherosclerosis of aorta

## 2021-07-25 NOTE — PROGRESS NOTES
Cardiology Progress Note    Shyanne Shih Patient Status:  Inpatient    1932 MRN [id number]   Location [facility] 2W/SW Attending Denise Diana,    Hosp Day # 21 PCP Angelica Espino MD     80year-old male admitted  with symptom Cardiology  [address]    --------------------------------------------------------------------------------------------------------------------------------  ROS 12 systems reviewed, pertinent findings above.   Review of Systems   All ot MD  7/24/2021  6:20 AM

## 2021-07-25 NOTE — PLAN OF CARE
Problem: NEUROLOGICAL - ADULT  Goal: Achieves stable or improved neurological status  Description: INTERVENTIONS  - Assess for and report changes in neurological status  - Initiate measures to prevent increased intracranial pressure  - Maintain blood press

## 2021-07-25 NOTE — PROGRESS NOTES
[facility]    Progress Note    Johnathon Vieira Patient Status:  Inpatient    1932 MRN [id number]   Location [facility] 2W/SW Attending Marva Kendrick,    Hosp Day # 23 PCP Reginald Carranza MD       Subjective:   Johnathon Vieira Once  [START ON 7/25/2021] calcium gluconate 4 g in sodium chloride 0.9% 250 mL IVPB, , Intravenous, Once  vancomycin IVPB premix 1.5g in 0.9% NaCl 250 mL, 1,500 mg, Intravenous, Q18H  Meropenem (MERREM) 500 mg in sodium chloride 0.9% 100 mL IVPB-MBP, 500 PRN  hydrALAzine HCl (APRESOLINE) injection 10 mg, 10 mg, Intravenous, Q2H PRN  aspirin 300 MG rectal suppository 300 mg, 300 mg, Rectal, Daily   Or  aspirin tab 325 mg, 325 mg, Oral, Daily  atorvastatin (LIPITOR) tab 40 mg, 40 mg, Oral, Nightly  Heparin S

## 2021-07-25 NOTE — PROCEDURES
[facility] - [facility]  Procedure Note  21    Heverfermin Vieira Patient Status:  Inpatient    /with MRN [id number]   [facility] 2W/SW Attending Marva Kendrick,    Hosp Day # 23 PCP Reginald Carranza MD     Procedure: Jefry Carvajal

## 2021-07-25 NOTE — PROGRESS NOTES
[place] [facility] [place] [facility]"" - [facility]    Progress Note      Subjective:     S/p intubation yesterday night for hypoxia - currently intubated with 40% fio2 and was taken off of sedation.  Getting started on 4th round of plasmapharesis       Review of Systems:     Elkin Guzman 125 mg, Per NG Tube, BID  metoprolol tartrate (LOPRESSOR) tab 100 mg, 100 mg, Per NG Tube, 2x Daily(Beta Blocker)  sodium chloride 0.9% IV bolus 500 mL, 500 mL, Intravenous, Once  Metoclopramide HCl (REGLAN) injection 10 mg, 10 mg, Intravenous, Q8H PRN  no HCT 23.0* 23.5* 23.9*   MCV 92.0 94.4 97.6   NEPRELIM 7.79* 8.18* 8.93*   WBC 9.3 9.6 10.5   .0 331.0 343.0     Recent Labs   Lab 07/21/21  0512 07/21/21  0512 07/22/21  0636 07/22/21  0636 07/23/21  0502 07/24/21  0428 07/25/21  0530   * opacification of the right mastoid/middle ear cavities as well as subtotal opacification of the left mastoid cavity. These findings are similar in comparison to prior. 5. Lesser incidental findings as above.    A preliminary report was issued by the [facility] extubated on 7/22 and reintubated on 7/24  - s/p IVIG treatments for myasthenia gravis   - neurology recommended for plasmapheresis   - s/p 3 treatments - first on 7/20. - 4th treatment today   - fibrinogen level wnl today - repeat in am  - right internal

## 2021-07-25 NOTE — DIETARY NOTE
ADULT NUTRITION UPDATE NOTE    Pt is at high nutrition risk. Pt meets moderate malnutrition criteria.       CRITERIA FOR MALNUTRITION DIAGNOSIS:  Criteria for non-severe malnutrition diagnosis: chronic illness related to energy intake less than75% for grea prior to this. MD checking for blood in stool and for C. Diff toxin. Hypernatremia- calculated FWD: ~ 1 L. FWF increased to 200 ml q 4 hrs X 24 hrs and will adjust in future pending labs.  Will change tube feeds to  Non-fiber Osmolite 1.2 same rate (no fib Intravenous Q12H   • vancomycin HCl  125 mg Per NG Tube BID   • metoprolol tartrate  100 mg Per NG Tube 2x Daily(Beta Blocker)   • sodium chloride 0.9%  500 mL Intravenous Once   • Insulin Regular Human  1-7 Units Subcutaneous 4 times per day   • cetirizin 175-180 lbs       100% UBW    WEIGHT HISTORY:  Patient Weight(s) for the past 336 hrs:   Weight   07/22/21 0630 81.4 kg (179 lb 7.3 oz)   07/20/21 1042 79.8 kg (176 lb)   07/20/21 0300 79.9 kg (176 lb 1.6 oz)   07/19/21 0500 82.6 kg (182 lb)   07/17/21 010 and mineral supplements: iron  - Feeding assistance: NPO  - Nutrition education: not appropriate at this time  - Coordination of nutrition care: collaboration with other providers and discussed in Care Rounds   - Discharge and transfer of nutrition care to

## 2021-07-25 NOTE — PROGRESS NOTES
[facility]    Progress Note      Assessment and Plan:     1. Respiratory failure–aspiration pneumonitis with weakness secondary to presumed myasthenic crisis. The patient tolerated extubation for few days and then worsened yesterday.   I r Lungs diminished at right base to auscultation and percussion. Cardiac regular rate and rhythm no murmur. Abdomen nontender, without hepatosplenomegaly and no mass appreciable. Extremities without clubbing cyanosis nor edema.    Neurologic weaker tod

## 2021-07-25 NOTE — RESPIRATORY THERAPY NOTE
At 2135: patient intubated by Dr. Zoe Curry with 7.5 ETT secured 23 cms at the lip. Patient was placed on the following vent settings: AC 14 400 +5/100%. Bilateral BS heard, suctioned large amount of thick yellow secretion.      ABG done at 2205: increased RR t

## 2021-07-25 NOTE — PLAN OF CARE
Patient intubated/vented at 60% Fio2. On propofol for sedation. Bilateral wrist restraints on. Suctioned as needed. Tolerating tube feeding. Had one BM. Rivas to gravity.     Problem: RESPIRATORY - ADULT  Goal: Achieves optimal ventilation and oxygenation for edema, trend weights  Outcome: Progressing  Goal: Absence of cardiac arrhythmias or at baseline  Description: INTERVENTIONS:  - Continuous cardiac monitoring, monitor vital signs, obtain 12 lead EKG if indicated  - Evaluate effectiveness of antiarrhyth impaired, hearing impaired and aphasic patients to use assistive/communication devices  Outcome: Not Progressing     Problem: Impaired Swallowing  Goal: Minimize aspiration risk  Description: Interventions:  - Patient should be alert and upright for all fe

## 2021-07-26 NOTE — PROGRESS NOTES
[facility]    Progress Note      Assessment and Plan:     1. Respiratory failure–aspiration pneumonitis with weakness secondary to presumed myasthenic crisis. The patient tolerated extubation for few days and then worsened yesterday.   I r equal round and reactive to light and accommodation. Neck without adenopathy, thyromegaly, JVD nor bruit. Lungs diminished at right base to auscultation and percussion. Cardiac regular rate and rhythm no murmur.    Abdomen nontender, without hepatosple

## 2021-07-26 NOTE — PLAN OF CARE
Patient remains on vent. Soft wrist restraints in place for device protection. Passive ROM performed. CMS intact. Safety maintained.       Problem: Safety Risk - Non-Violent Restraints  Goal: Patient will remain free from self-harm  Description: MAGDY

## 2021-07-26 NOTE — PROGRESS NOTES
INFECTIOUS DISEASE PROGRESS NOTE  [facility] OF [facility] ID PROGRESS NOTE    Karolina Ogden Patient Status:  Inpatient    1932 MRN [id number]   Location [facility] 2W/SW Attending Rohit Wiley Day # 21 PCP Da 7/7-7/11)     25-year-old male, intubated unable to provide any history with a documented history of HTN, HLD, CAD, CHF with AICD now presents to hospital 7/5 from home with a fall, blurry vision, left-sided weakness with initial concern for CVA versus anil

## 2021-07-26 NOTE — PLAN OF CARE
Patient more alert, following commands, moves all extremities. PERRLA. Hand grasps weak. No sedation. Remains on vent. No SBT today. No distress noted. Vpaced on monitor. TF at goal , tolerating well. Accuchecks Dced per protocol.  CXR ordered for AM. See a should be alert and upright for all feedings (90 degrees preferred)  - Offer food and liquids at a slow rate  - No straws  - Encourage small bites of food and small sips of liquid  - Offer pills one at a time, crush or deliver with applesauce as needed  - Monitor for bleeding, hypotension and signs of decreased cardiac output  - Evaluate effectiveness of vasoactive medications to optimize hemodynamic stability  - Monitor arterial and/or venous puncture sites for bleeding and/or hematoma  - Assess quality of

## 2021-07-26 NOTE — PROGRESS NOTES
[facility] HOSP - [facility]    Cardiology Progress Note    Gonzalezjonel RuedaLeo Patient Status:  Inpatient    1932 MRN [id number]   Location [facility] 2W/SW Attending Stephen Amaya DO   Hosp Day # 21 PCP Dilcia Kwok MD     Subjective Volume (mL) (vancomycin IVPB premix 1.5g in 0.9% NaCl 250 mL) 250 -- --    Volume (mL) (Meropenem (MERREM) 500 mg in sodium chloride 0.9% 100 mL IVPB-MBP) 100 -- --    Total Intake 2010.9 5413 --       Output    Urine  1450  2325  --    Output (mL) (Ure 07/26/2021    CO2 30.0 07/26/2021     (H) 07/26/2021    CA 7.3 (L) 07/26/2021    ALB 3.3 (L) 07/25/2021    ALKPHO 52 07/22/2021    BILT 0.5 07/22/2021    TP 4.7 (L) 07/22/2021    AST 43 (H) 07/22/2021    ALT 47 07/22/2021    TSH 2.110 06/19/2020 AP PORTABLE  (CPT=71045)    Result Date: 7/25/2021  CONCLUSION:  1. Endotracheal tube tip projects 5.4 cm above the level of the rubens. Enteric tube tip projects over the expected location of the gastric fundus/body.  2. Probable congestive failure/fluid Accession: [id number] HT:(70in) WT:(160.7lb)                       BP: 155 / 70 ------------------------------------------------------------------- Indications:      Stroke, atrial fibrillation. ------------------------------------------------------------ Patient status: Inpatient. Study date:  Study date: 07/06/2021. Study time: 11:01 AM.  Location:  Echo laboratory. ------------------------------------------------------------------- Findings Left ventricle:   The cavity size was normal. Wall thickness wa pressure. ------------------------------------------------------------------- Measurements  Left ventricle                 Value        02/17/2018 Reference  LV ID, ED, PLAX                4.4   cm     ---------- 4.2 - 5.8  LV ID, ES, PLAX                3 ---------- 16 - 34   Mitral valve                   Value        02/17/2018 Reference  Mitral E-wave peak             75.5  cm/sec 30.8       -----------  velocity  Mitral deceleration            187   ms     120        -----------  time  Mitral peak gradi pneumoninitis, completed IVIG on 7/14.    - Now on plasmapheresis, improvement in strength  - Extubated 7/22, on BIPAP, then reintubated 7/24 4 PM  - Further management per neurology and pulmonology  - Lasix 40 mg IV BID     HFrEF  - Echo with EF 20-25% wit

## 2021-07-26 NOTE — PROGRESS NOTES
[facility]    Progress Note    Richard Diaz Patient Status:  Inpatient    1932 MRN [id number]   Location [facility] 2W/SW Attending Mercedez Tomlinson DO   Hosp Day # 20 PCP Natasha Peguero MD       Subjective:   Richard Diaz Intravenous, Once  Metoclopramide HCl (REGLAN) injection 10 mg, 10 mg, Intravenous, Q8H PRN  norepinephrine (LEVOPHED) 4 mg/250 ml premix infusion, 0.5-30 mcg/min, Intravenous, Continuous  propofol (DIPRIVAN) infusion, 5-100 mcg/kg/min (Dosing Weight), Int 114 (H) 07/25/2021    CO2 29.0 07/25/2021    GLU 99 07/25/2021    CA 7.4 (L) 07/25/2021    ALB 3.3 (L) 07/25/2021    ALKPHO 52 07/22/2021    BILT 0.5 07/22/2021    TP 4.7 (L) 07/22/2021    AST 43 (H) 07/22/2021    ALT 47 07/22/2021    TSH 2.110 06/19/2020 by the [facility] Radiology teleradiology service. There are no major discrepancies.   Remote - PS  Dictated by (CST): Perry Sorto MD on 7/25/2021 at 9:06 AM     Finalized by (CST): Perry Sorto MD on 7/25/2021 at 9:10 AM          XR CHEST AP PORTABLE  (

## 2021-07-26 NOTE — PROGRESS NOTES
[facility]    Progress Note      Subjective:     Awake and remain intubated - on 40% FIo2       Review of Systems:     Unable to obtain - intubated     Objective:   Temp:  [97.5 °F (36.4 °C)-99.3 °F (37.4 °C)] 97.5 °F (36.4 °C)  Pulse:  [ Continuous  glucose (DEX4) oral liquid 15 g, 15 g, Oral, Q15 Min PRN   Or  Glucose-Vitamin C (DEX-4) chewable tab 4 tablet, 4 tablet, Oral, Q15 Min PRN   Or  dextrose 50 % injection 50 mL, 50 mL, Intravenous, Q15 Min PRN   Or  glucose (DEX4) oral liquid 30 142   < > 144   < > 145 146* 145   K 4.6   < > 4.6   < > 5.1 5.6* 5.3*      < > 117*   < > 115* 114* 113*   CO2 25.0   < > 23.0   < > 26.0 29.0 30.0   ALKPHO 62  --  52  --   --   --   --    AST 73*  --  43*  --   --   --   --    ALT 74*  --  47  -- 7/26/2021  CONCLUSION:    Diffusely hazy/ground-glass lungs with moderate bilateral pleural effusions. Mild central pulmonary vascular congestion. The main differential considerations are CHF/pulmonary edema versus multifocal pneumonitis.  Findings not si IVIG treatments for myasthenia gravis   - neurology recommended for plasmapheresis   - s/p 4 treatments - first on 7/20.  Will assess for fifth treatment tomm  - fibrinogen level low- repeat in am   - right internal jugular temp catheter  - ionized calcium

## 2021-07-26 NOTE — PROGRESS NOTES
RESPIRATORY THERAPY MECHANICAL VENTILATION PROGRESS NOTE    Ventilator Weaning:  Patient meets criteria for weaning? yes Weaning was attempted no   No SBT today per Dr. Connor Witt.     Current Ventilator Data:    Pt. received on following vent setting with ET tu

## 2021-07-26 NOTE — PLAN OF CARE
Patient unable to follow commands. Opens eyes spontaneously. Propofol off since 7am. Vent FiO2:40%. In-Line and oral suction as needed, yellow/white secretions noted. Plasmapheresis #4 done. Enteral feeding formula changed to Osmolite, tolerating well.  No devices  Outcome: Progressing     Problem: Impaired Swallowing  Goal: Minimize aspiration risk  Description: Interventions:  - Patient should be alert and upright for all feedings (90 degrees preferred)  - Offer food and liquids at a slow rate  - No straws Glucose maintained within prescribed range  Description: INTERVENTIONS:  - Monitor Blood Glucose as ordered  - Assess for signs and symptoms of hyperglycemia and hypoglycemia  - Administer ordered medications to maintain glucose within target range  - Asse nutrition and elimination needs   - Reorient and redirection as needed  - Assess for the need to continue restraints  Outcome: Progressing

## 2021-07-26 NOTE — PLAN OF CARE
Patient open eyes and moves his feet but doesn't squeeze hands. Remains intubated/vented. Bilateral wrist restraints in place . Not on any sedation. HOB elevated. Tolerating tube feeding. Had 2 BM. Rivas draining well.       Problem: Patient Centered Care eval  -OT eval  -Speech eval  -IVF  -ASA/Plavix  -2D echo  -CT head    - See additional Care Plan goals for specific interventions  Outcome: Progressing  Goal: Patient/Family Short Term Goal  Description: Patient's Short Term Goal: get stronger    Interven optimize hemodynamic stability  - Monitor arterial and/or venous puncture sites for bleeding and/or hematoma  - Assess quality of pulses, skin color and temperature  - Assess for signs of decreased coronary artery perfusion - ex.  Angina  - Evaluate fluid b

## 2021-07-27 NOTE — PLAN OF CARE
Patient remains on vent, no sedation. Bilateral restraints in place. Follows commands, moves all extremities. VSS. Afebrile. Rivas in place with adequate output.    Problem: Patient Centered Care  Goal: Patient preferences are identified and integrated in t Encourage small bites of food and small sips of liquid  - Offer pills one at a time, crush or deliver with applesauce as needed  - Discontinue feeding and notify MD (or speech pathologist) if coughing or persistent throat clearing or wet/gurgly vocal quali optimize hemodynamic stability  - Monitor arterial and/or venous puncture sites for bleeding and/or hematoma  - Assess quality of pulses, skin color and temperature  - Assess for signs of decreased coronary artery perfusion - ex.  Angina  - Evaluate fluid b nutrition and elimination needs   - Reorient and redirection as needed  - Assess for the need to continue restraints  7/27/2021 0249 by Galina Pal  Outcome: Progressing  7/26/2021 2027 by Galina Pal  Outcome: Progressing

## 2021-07-27 NOTE — PROGRESS NOTES
[facility]    Progress Note    Melly Hollins Patient Status:  Inpatient    1932 MRN [id number]   Location [facility] 2W/SW Attending Josep Candelaria, DO   Hosp Day # 21 PCP Lucy Tipton MD       Subjective:   Melly Hollins Or  dextrose 50 % injection 50 mL, 50 mL, Intravenous, Q15 Min PRN   Or  glucose (DEX4) oral liquid 30 g, 30 g, Oral, Q15 Min PRN   Or  Glucose-Vitamin C (DEX-4) chewable tab 8 tablet, 8 tablet, Oral, Q15 Min PRN  Cetirizine HCl (ZYRTEC) tab 5 mg, 5 mg, Or for acute ischemia persist, consider followup brain MR. 2. Nonspecific white matter changes involving both cerebral hemispheres that most likely reflect sequelae of chronic microangiopathy. 3. Intracranial atherosclerosis.  4. Nonspecific near complete opac There are no major discrepancies.   Remote - PS  Dictated by (CST): Wu Velazquez MD on 7/25/2021 at 9:06 AM     Finalized by (CST): Wu Velazquez MD on 7/25/2021 at 9:10 AM          XR CHEST AP PORTABLE  (CPT=71045)    Result Date: 7/25/2021  CONCLUSIO

## 2021-07-27 NOTE — RESPIRATORY THERAPY NOTE
Patient received on full vent support A/C 20 400 40% +5 with 7.5 ETT taped 25 @ the lip. Patient suctioned as needed. Respiratory will continue to monitor.

## 2021-07-27 NOTE — PLAN OF CARE
Problem: Patient Centered Care  Goal: Patient preferences are identified and integrated in the patient's plan of care  Description: Interventions:  - What would you like us to know as we care for you?  My wife has MS, and I can no longer take care of her, (or speech pathologist) if coughing or persistent throat clearing or wet/gurgly vocal quality is noted  Outcome: Progressing     Problem: Patient/Family Goals  Goal: Patient/Family Long Term Goal  Description: Patient's Long Term Goal: return home    Inter condition, hydration, nutrition and elimination needs   - Reorient and redirection as needed  - Assess for the need to continue restraints  Outcome: Progressing     Problem: CARDIOVASCULAR - ADULT  Goal: Maintains optimal cardiac output and hemodynamic sta

## 2021-07-27 NOTE — PROGRESS NOTES
[facility]  Cardiology Progress Note    Clover Edmond Patient Status:  Inpatient    1932 MRN [id number]   Location [facility] 2W/SW Attending Mis Villasenor, DO   Hosp Day # 25 PCP Tammy Castleman, MD     Subjective (Urethral Catheter Latex) 1925 2100 --    Output (mL) ([REMOVED] Urethral Catheter) 400 -- --    Emesis/NG output  0  --  --    Output (mL) (Feeding Tube NG Right nare) 0 -- --    Stool  --  --  --    Stool Count Calculated for I/O 1 x 5 x --    Total Outp 07/22/2021    PHOS 2.4 (L) 07/27/2021    TROP <0.045 07/07/2021    CK 37 (L) 07/05/2021    B12 343 05/17/2021       Recent Labs   Lab 07/25/21  0530 07/26/21  0521 07/27/21  0423   GLU 99 116* 101*   BUN 28* 33* 36*   CREATSERUM 0.60* 0.67* 0.56*   GFRAA 1 congestive heart failure pulmonary edema versus multifocal pneumonitis.   3. Support tubes and catheters are satisfactory    Dictated by (CST): Rene Uriarte MD on 7/27/2021 at 9:09 AM     Finalized by (CST): Rene Uriarte MD on 7/27/2021 at 9:1 reflect pneumonia. 3. Additional support tubes and lines as above.     Dictated by (CST): Dolores Abraham MD on 7/25/2021 at 8:11 AM     Finalized by (CST): Dolores Abraham MD on 7/25/2021 at 8:16 AM              CARD ECHO 2D DOPPLER CONTRAST (CPT=93306) study these findings represent indicate worsening. ------------------------------------------------------------------- Study data:  Comparison was made to the study of 02/17/2018. Study status:  Elective. Procedure:  Transthoracic echocardiography.  The s normal. Pacer wire or catheter noted in right ventricle. Systolic function was normal. Pulmonic valve:   Not well visualized. The valve appears to be grossly normal.    Doppler: There was no evidence for stenosis. Trivial regurgitation.  Tricuspid valve 0.6 - 1.0   LVOT                           Value        02/17/2018 Reference  LVOT ID, S                     2.2   cm     ---------- -----------  Stroke volume (SV),            47    ml     ---------- -----------  LVOT DP  Stroke index (SV/bsa), MD Rogerio     Exam:     General: Intubated  HEENT: Normocephalic, anicteric sclera, neck supple. Neck: No JVD, carotids 2+, no bruits. Cardiac: Regular rate and rhythm.  S1, S2 normal. No murmur, pericardial rub, S3.  Lungs: Clear without wheezes, rales,

## 2021-07-27 NOTE — PROGRESS NOTES
[facility]    Progress Note    Quincymauricio Kimblevera Patient Status:  Inpatient    1932 MRN [id number]   Location [facility] 2W/SW Attending Arnold Figueroa, DO   Hosp Day # 25 PCP Vernon Velazquez MD       Subjective:   Karolina Ogden (LEVOPHED) 4 mg/250 ml premix infusion, 0.5-30 mcg/min, Intravenous, Continuous  propofol (DIPRIVAN) infusion, 5-100 mcg/kg/min (Dosing Weight), Intravenous, Continuous  glucose (DEX4) oral liquid 15 g, 15 g, Oral, Q15 Min PRN   Or  Glucose-Vitamin C (DEX- 07/22/2021    TSH 2.110 06/19/2020    MG 2.5 07/22/2021    PHOS 2.4 (L) 07/27/2021    TROP <0.045 07/07/2021    CK 37 (L) 07/05/2021    B12 343 05/17/2021       XR CHEST AP PORTABLE  (CPT=71045)    Result Date: 7/27/2021  CONCLUSION:  1.  Cardiomegaly with

## 2021-07-27 NOTE — PROGRESS NOTES
[facility]    [facility]      Assessment and Plan:     1. Respiratory failure–aspiration pneumonitis with weakness secondary to presumed myasthenic crisis. The patient definitely has pneumonia.   His strength improves significantly when percussion. Cardiac regular rate and rhythm no murmur. Abdomen nontender, without hepatosplenomegaly and no mass appreciable. Extremities without clubbing cyanosis nor edema. Neurologic weaker today and much less expressive in the face.   Skin withou

## 2021-07-27 NOTE — PROGRESS NOTES
[facility]    Progress Note      Subjective:     Awake and remain intubated - on 40% Fio2      Review of Systems:     Unable to obtain - intubated     Objective:   Temp:  [97.3 °F (36.3 °C)-98.6 °F (37 °C)] 98.4 °F (36.9 °C)  Pulse:  [25- (Dosing Weight), Intravenous, Continuous  glucose (DEX4) oral liquid 15 g, 15 g, Oral, Q15 Min PRN   Or  Glucose-Vitamin C (DEX-4) chewable tab 4 tablet, 4 tablet, Oral, Q15 Min PRN   Or  dextrose 50 % injection 50 mL, 50 mL, Intravenous, Q15 Min PRN   Or 3.3*  --  3.3*  --  3.1*      < > 144   < > 146* 145 144   K 4.6   < > 4.6   < > 5.6* 5.3* 4.8      < > 117*   < > 114* 113* 114*   CO2 25.0   < > 23.0   < > 29.0 30.0 29.0   ALKPHO 62  --  52  --   --   --   --    AST 73*  --  43*  --   --   - (CST): Raoul Kim MD on 7/26/2021 at 8:40 AM            Assessment and Plan:      1.  Seronegative myasthenia Gravis :  - extubated on 7/22 and reintubated on 7/24  - s/p IVIG treatments for myasthenia gravis   - neurology recommended for plasmapheresis

## 2021-07-27 NOTE — PLAN OF CARE
Patient remains in soft bilateral restraints for safety concerns.     Problem: Safety Risk - Non-Violent Restraints  Goal: Patient will remain free from self-harm  Description: INTERVENTIONS:  - Apply the least restrictive restraint to prevent harm  - Notif

## 2021-07-28 NOTE — PLAN OF CARE
Patient was received this AM lethargic but responsive and following commands, on ventilator without sedation. Per pulmonology, no spontaneous breathing trial today.  Will reassess tomorrow if improvement on chest xray and respiratory status after continued Encourage and assist patient to increase activity and self care with guidance from PT/OT  - Encourage visually impaired, hearing impaired and aphasic patients to use assistive/communication devices  Outcome: Progressing     Problem: Impaired Swallowing  Go difficulty  - Respiratory Therapy support as indicated  - Manage/alleviate anxiety  - Monitor for signs/symptoms of CO2 retention  Outcome: Progressing     Problem: CARDIOVASCULAR - ADULT  Goal: Maintains optimal cardiac output and hemodynamic stability  D

## 2021-07-28 NOTE — PROGRESS NOTES
Patient received on vent settings noted, no vent changes made overnight, pt suctioned as needed, RT will continue to monitor pt.      07/28/21 0330   Vent Information   $ RT Standby Charge (per 15 min) 1  (vent check)   $ Vent Care / Non-Invasive Bharati

## 2021-07-28 NOTE — PROGRESS NOTES
[facility]    Progress Note    Yani Santamaria Patient Status:  Inpatient    1932 MRN [id number]   Location [facility] 2W/SW Attending Tracey Carvajal,    Hosp Day # 23 PCP Ericka Lazo MD       Subjective:   Yani Santamaria Intravenous, Continuous  glucose (DEX4) oral liquid 15 g, 15 g, Oral, Q15 Min PRN   Or  Glucose-Vitamin C (DEX-4) chewable tab 4 tablet, 4 tablet, Oral, Q15 Min PRN   Or  dextrose 50 % injection 50 mL, 50 mL, Intravenous, Q15 Min PRN   Or  glucose (DEX4) o 05/17/2021       XR CHEST AP PORTABLE  (CPT=71045)    Result Date: 7/28/2021  CONCLUSION:   Borderline cardiomegaly and central pulmonary vascular congestion with layering bilateral pleural effusions, improving on the left.   Diffuse ground-glass opacificat

## 2021-07-28 NOTE — PLAN OF CARE
Patient remains in bilateral soft wrist restraints to prevent discontinuation of medical equipment. Range of motion exercises performed. Assessed Q2 hours as charted.      Problem: Safety Risk - Non-Violent Restraints  Goal: Patient will remain free from se

## 2021-07-28 NOTE — PLAN OF CARE
Problem: Patient Centered Care  Goal: Patient preferences are identified and integrated in the patient's plan of care  Description: Interventions:  - What would you like us to know as we care for you?  My wife has MS, and I can no longer take care of he Term Goal: get stronger    Interventions:   - neurology consult  - PT eval  -OT eval  -Speech eval  -IVF  -ASA/Plavix  -2D echo  -CT head  - See additional Care Plan goals for specific interventions  Outcome: Progressing     Problem: RESPIRATORY - ADULT  G replacement therapy as ordered  Outcome: Progressing     Problem: Safety Risk - Non-Violent Restraints  Goal: Patient will remain free from self-harm  Description: INTERVENTIONS:  - Apply the least restrictive restraint to prevent harm  - Notify patient an

## 2021-07-28 NOTE — TELEPHONE ENCOUNTER
Pt currently hospitalized    Refill request has failed the Ambulatory Medication Refill Standing Order and is routed to the primary physician to review the following:    Requested Prescriptions     Refused Prescriptions Disp Refills   • ATENOLOL 25 MG Oral

## 2021-07-28 NOTE — PROGRESS NOTES
INFECTIOUS DISEASE PROGRESS NOTE  [facility] OF [facility]HA ID PROGRESS NOTE    Melly Gurvinder Patient Status:  Inpatient    1932 MRN [id number]   Location [facility] 2W/SW Attending Kendrick Nova Day # 23 PCP Gray 7/7-7/11)     80-year-old male, intubated unable to provide any history with a documented history of HTN, HLD, CAD, CHF with AICD now presents to hospital 7/5 from home with a fall, blurry vision, left-sided weakness with initial concern for CVA versus anil

## 2021-07-28 NOTE — PLAN OF CARE
Remains on bilateral soft restraints d/t safety concerns.   Problem: Safety Risk - Non-Violent Restraints  Goal: Patient will remain free from self-harm  Description: INTERVENTIONS:  - Apply the least restrictive restraint to prevent harm  - Notify patient

## 2021-07-28 NOTE — PROGRESS NOTES
[facility]  Cardiology Progress Note    Destiny Leonardo Patient Status:  Inpatient    1932 MRN [id number]   Location [facility] 2W/SW Attending Nuha Pineda, DO   Hosp Day # 23 PCP Chasidy Fuller MD     Subjective Total Output 2100 0085 --       Net I/O     1143 -392 --           Vent Settings: Vent Mode: VC/AC  FiO2 (%):  [40 %] 40 %  S RR:  [20] 20  S VT:  [400 mL] 400 mL  PEEP/CPAP (cm H2O):  [5 cm H20] 5 cm H20  MAP (cm H2O):  [8-12] 8.3    Scheduled Meds:   • s Lab 07/26/21  0521 07/26/21  0521 07/27/21  0423 07/27/21  1749 07/28/21  0532   RBC 2.47*  --  2.21*  --  2.68*   HGB 7.3*   < > 6.5* 8.0* 8.1*   HCT 23.9*   < > 21.4* 25.7* 25.7*   MCV 96.8  --  96.8  --  95.9   MCH 29.6  --  29.4  --  30.2   MCHC 30.5 changed over  the interval.    Dictated by (CST): Parris Zhao MD on 7/26/2021 at 8:36 AM     Finalized by (CST): Parris Zhao MD on 7/26/2021 at 8:40 AM              CARD ECHO 2D DOPPLER CONTRAST (CPT=93306)    Result Date: 7/6/2021                    * worsening. ------------------------------------------------------------------- Study data:  Comparison was made to the study of 02/17/2018. Study status:  Elective. Procedure:  Transthoracic echocardiography.  The study was technically limited due to body ventricle. Systolic function was normal. Pulmonic valve:   Not well visualized. The valve appears to be grossly normal.    Doppler: There was no evidence for stenosis. Trivial regurgitation. Tricuspid valve:   Structurally normal valve.    Mobility was Value        02/17/2018 Reference  LVOT ID, S                     2.2   cm     ---------- -----------  Stroke volume (SV),            47    ml     ---------- -----------  LVOT DP  Stroke index (SV/bsa),         25    ml/m^2 ---------- -----------  LVOT DP General: Intubated  HEENT: Normocephalic, anicteric sclera, neck supple. Neck: No JVD, carotids 2+, no bruits. Cardiac: Regular rate and rhythm. S1, S2 normal. No murmur, pericardial rub, S3.  Lungs: Clear without wheezes, rales, rhonchi or dullness.

## 2021-07-29 NOTE — PLAN OF CARE
Patient was received this AM, on ventilator, responsive and following commands though fatigues easily. Remains in bilateral soft wrist restraints for safety due to attempting to remove ET tube.  Patient was placed on spontaneous breathing trial for 2 hours assistive/communication devices  Outcome: Progressing     Problem: Impaired Swallowing  Goal: Minimize aspiration risk  Description: Interventions:  - Patient should be alert and upright for all feedings (90 degrees preferred)  - Offer food and liquids at CARDIOVASCULAR - ADULT  Goal: Maintains optimal cardiac output and hemodynamic stability  Description: INTERVENTIONS:  - Monitor vital signs, rhythm, and trends  - Monitor for bleeding, hypotension and signs of decreased cardiac output  - Evaluate effectiv

## 2021-07-29 NOTE — PROGRESS NOTES
[facility]  Cardiology Progress Note    Johnathon Vieira Patient Status:  Inpatient    1932 MRN [id number]   Location [facility] 2W/SW Attending Marva Kendrick,    Hosp Day # 24 PCP Reginald Carranza MD     Subjective sodium chloride 0.9% 100 mL IVPB-MBP) -- 303 --    Total Intake 6618 1392 --       Output    Urine  3420  3875  --    Output (mL) (Urethral Catheter Latex) 9369 3875 --    Stool  --  --  --    Stool Count Calculated for I/O 2 x 1 x --    Total Output 1585 07/27/21  0423 07/28/21  0532 07/29/21  0429   * 105* 106*   BUN 36* 34* 32*   CREATSERUM 0.56* 0.60* 0.62*   GFRAA 107 104 102   GFRNAA 92 90 89   CA 6.7* 7.7* 7.7*    142 143   K 4.8 4.6 4.5   * 109 105   CO2 29.0 31.0 33.0*     Recent 7/6/2021                    * [facility]* -------------------------------------------------------------------      Transthoracic Echocardiography M-mode, complete 2D, complete spectral Doppler, color Doppler, and IV contrast ------------- technically limited due to body habitus. Scanning was performed from the parasternal, apical, and subcostal acoustic windows. Intravenous contrast (Definity) was administered. Study completion:  The patient tolerated the procedure well.  There were no comp Structurally normal valve. Mobility was not restricted. Doppler: There was no evidence for stenosis. Mild regurgitation. Pulmonary artery:   Normal pulmonary artery pressure. Right atrium:  The atrium was moderately dilated.  Pericardium:  There was 25    ml/m^2 ---------- -----------  LVOT DP   Aorta                          Value        02/17/2018 Reference  Aortic root ID                 3.8   cm     ---------- &lt;4.1  Ascending aorta ID,            3.7   cm     ---------- 2.2 - 3.8  A-P, S   Left rhonchi or dullness.  Normal excursions and effort. Abdomen: Soft, non-tender. BS-present. Extremities: Without clubbing, cyanosis or edema.  Peripheral pulses are 2+.   Neurologic: Non-focal  Skin: Warm and dry.     Assessment and Plan:   Dyspnea: [place]

## 2021-07-29 NOTE — PLAN OF CARE
Patient intubated, no sedation. Patient drowsy but following commands. Patient remains in bilateral soft wrist restraints. FiO2 40%. Lasix given. Adequate urine output from lucio. Tube feeds infusing through dobhoff, tolerated well.  Follow up chest xray th Interventions:  - Patient should be alert and upright for all feedings (90 degrees preferred)  - Offer food and liquids at a slow rate  - No straws  - Encourage small bites of food and small sips of liquid  - Offer pills one at a time, crush or deliver wit rhythm, and trends  - Monitor for bleeding, hypotension and signs of decreased cardiac output  - Evaluate effectiveness of vasoactive medications to optimize hemodynamic stability  - Monitor arterial and/or venous puncture sites for bleeding and/or hematom

## 2021-07-29 NOTE — PROGRESS NOTES
[facility]    Progress Note    Destiny Leonardo Patient Status:  Inpatient    1932 MRN [id number]   Location [facility] 2W/SW Attending Nhua Pineda, DO   Hosp Day # 24 PCP Chasidy Fuller MD       Subjective:   Destiny Leonardo mcg/min, Intravenous, Continuous  propofol (DIPRIVAN) infusion, 5-100 mcg/kg/min (Dosing Weight), Intravenous, Continuous  glucose (DEX4) oral liquid 15 g, 15 g, Oral, Q15 Min PRN   Or  Glucose-Vitamin C (DEX-4) chewable tab 4 tablet, 4 tablet, Oral, Q15 M 07/22/2021    PHOS 3.4 07/28/2021    TROP <0.045 07/07/2021    CK 37 (L) 07/05/2021    B12 343 05/17/2021       XR CHEST AP PORTABLE  (CPT=71045)    Result Date: 7/29/2021  CONCLUSION:  1.  Essentially unchanged chest with redemonstration of heart size uppe

## 2021-07-29 NOTE — DIETARY NOTE
ADULT NUTRITION REASSESSMENT    Pt is at high nutrition risk. Pt meets moderate malnutrition criteria.       CRITERIA FOR MALNUTRITION DIAGNOSIS:  Criteria for non-severe malnutrition diagnosis: chronic illness related to energy intake less than75% for gre in stool and for C. Diff toxin. Hypernatremia- calculated FWD: ~ 1 L. FWF increased to 200 ml q 4 hrs X 24 hrs and will adjust in future pending labs.  Will change tube feeds to  Non-fiber Osmolite 1.2 same rate (no fiber in view of potential GIB and loose mL Nebulization TID   • pantoprazole (PROTONIX) IV push  40 mg Intravenous Q12H   • vancomycin  125 mg Per NG Tube BID   • metoprolol tartrate  100 mg Per NG Tube 2x Daily(Beta Blocker)   • sodium chloride 0.9%  500 mL Intravenous Once   • cetirizine  5 mg 336 hrs:   Weight   07/29/21 0600 82.8 kg (182 lb 8.7 oz)   07/26/21 0600 81.4 kg (179 lb 7.3 oz)   07/22/21 0630 81.4 kg (179 lb 7.3 oz)   07/20/21 1042 79.8 kg (176 lb)   07/20/21 0300 79.9 kg (176 lb 1.6 oz)   07/19/21 0500 82.6 kg (182 lb)   07/17/21 0 this time  - Coordination of nutrition care: collaboration with other providers and discussed in Care Rounds   - Discharge and transfer of nutrition care to new setting or provider: to be determined    MONITOR AND EVALUATE/NUTRITION GOALS:  - Food and Nutr

## 2021-07-29 NOTE — CM/SW NOTE
Care Progression Note:  Active Acute Medical Issue:   Acute ischemic stroke [facility]) -Neurology following and treating for possible myasthenia gravis- received plasmapheresis X5    Respiratory Failure- aspiration pneumonitis with weakness secondary to presume

## 2021-07-29 NOTE — RESPIRATORY THERAPY NOTE
Patient received on full vent support A/C 20 400 40% +5 with 7.5 ETT. ETT required retaping and advanced back to 25 cm @ the lip. No other changes made. Respiratory to continue to monitor.

## 2021-07-29 NOTE — PROGRESS NOTES
[facility]    Progress Note      Assessment and Plan:     1. Respiratory failure–aspiration pneumonitis with weakness secondary to presumed myasthenic crisis. The patient definitely has pneumonia.   His strength improves significantly when diminished at right base to auscultation and percussion. Cardiac regular rate and rhythm no murmur. Abdomen nontender, without hepatosplenomegaly and no mass appreciable. Extremities without clubbing cyanosis nor edema.    Neurologic weaker today and m

## 2021-07-29 NOTE — PLAN OF CARE
Problem: RESPIRATORY - ADULT  Goal: Achieves optimal ventilation and oxygenation  Description: INTERVENTIONS:  - Assess for changes in respiratory status  - Assess for changes in mentation and behavior  - Position to facilitate oxygenation and minimize r Spontaneous RR Rate 14   Spontaneous Minute Volume 9.4   Average Spontaneous Tidal Volume 540   $ Spontaneous Vital Capacity 0.486   Negative Inspiratory Force -8.4   Total RSBI 24 07/29/21 0955   Weaning Trials   Spontaneous Breathing Trial Kitty

## 2021-07-29 NOTE — PLAN OF CARE
Patient intubated. No sedation. Patient remains in bilateral soft wrist restraints. Patient comfortable at this time.    Problem: Safety Risk - Non-Violent Restraints  Goal: Patient will remain free from self-harm  Description: INTERVENTIONS:  - Apply the l

## 2021-07-30 NOTE — PLAN OF CARE
Patient remains intubated, no sedation. Bilateral soft wrist restraints continued. ROM performed. Documented q 2 hours.      Problem: Safety Risk - Non-Violent Restraints  Goal: Patient will remain free from self-harm  Description: INTERVENTIONS:  - Apply t

## 2021-07-30 NOTE — PLAN OF CARE
Patient intubated with no sedation; alert and following commands. Breathing trial performed, patient tolerated for 1 hr 14 min. Returned back to vent settings of FI02 40%. Tolerating tube feedings, no residuals.      Problem: Patient Centered Care  Goal: Pa Phillip Finney  Outcome: Progressing  7/30/2021 1103 by Phillip Finney  Outcome: Progressing     Problem: Impaired Swallowing  Goal: Minimize aspiration risk  Description: Interventions:  - Patient should be alert and upright for all feedings (90 degrees pre Incentive Spirometry  - Assess the need for suctioning and perform as needed  - Assess and instruct to report SOB or any respiratory difficulty  - Respiratory Therapy support as indicated  - Manage/alleviate anxiety  - Monitor for signs/symptoms of CO2 ret Continuous cardiac monitoring, monitor vital signs, obtain 12 lead EKG if indicated  - Evaluate effectiveness of antiarrhythmic and heart rate control medications as ordered  - Initiate emergency measures for life threatening arrhythmias  - Monitor electro

## 2021-07-30 NOTE — PROGRESS NOTES
[facility]    Progress Note    Johnathon Vieira Patient Status:  Inpatient    1932 MRN [id number]   Location [facility] 2W/SW Attending Marva Kendrick DO   Hosp Day # 25 PCP Reginald Carranza MD     HPI/Subjective:     Castro Tilley congestive failure, pulmonary edema versus multifocal pneumonitis. Slight progression left perihilar region 3.  Support tubes and catheters are satisfactory    Dictated by (CST): Kaiser Steward MD on 7/30/2021 at 7:50 AM     Finalized by (CST): Nicole Mishra prognosis and sitting limit is appropriate , patient was agreeable for intubation  But does not want long-term mechanical ventilation per Dr. Jeff Morgan discussion with patient     D/w staff   Continue supportive Kitchen Kaylin Haas MD  7/30/2021

## 2021-07-30 NOTE — PLAN OF CARE
Patient intubated, no sedation. Patient can be restless at times, Resting comfortably. Patient remains in soft bilateral wrist restraints. Will continue to monitor.    Problem: Safety Risk - Non-Violent Restraints  Goal: Patient will remain free from self-h

## 2021-07-30 NOTE — PLAN OF CARE
Patient intubated, no sedation. Alert and following commands. Patient remains in soft bilateral wrist restraints. FiO2 40%. Rivas in place with adequate urine output. Tube feeds tolerated. Follow up chest xray this morning. Will continue to monitor.    Prob feedings (90 degrees preferred)  - Offer food and liquids at a slow rate  - No straws  - Encourage small bites of food and small sips of liquid  - Offer pills one at a time, crush or deliver with applesauce as needed  - Discontinue feeding and notify MD (o signs of decreased cardiac output  - Evaluate effectiveness of vasoactive medications to optimize hemodynamic stability  - Monitor arterial and/or venous puncture sites for bleeding and/or hematoma  - Assess quality of pulses, skin color and temperature  -

## 2021-07-30 NOTE — RESPIRATORY THERAPY NOTE
Patient received on full vent support A/C 20 400 40% +5 with 7.5 ETT taped 25 @ the lip. .  Suctioned as needed especially orally. Respiratory to continue to monitor.

## 2021-07-30 NOTE — RESPIRATORY THERAPY NOTE
7:15AM - Patient received intubated with a 7.5 ETT secured at 25 at the lip on vent with the following settings; VC/AC f20 Vt400 40% +5. MetaNeb treatment administered as scheduled. No changes made, suction is being provided as needed.  RT will continue to

## 2021-07-30 NOTE — PROGRESS NOTES
[facility] HOSP - [facility]  Cardiology Progress Note    Ras Jones Patient Status:  Inpatient    1932 MRN [id number]   Location [facility] 2W/SW Attending Tsering Donato, DO   Hosp Day # 25 PCP Negin Bower MD     Subjective Intake 7967 6136 --       Output    Urine  3875  2360  --    Output (mL) (Urethral Catheter Latex) 3875 2360 --    Stool  --  --  --    Stool Count Calculated for I/O 1 x 3 x 1 x    Total Output 3875 2360 --       Net I/O     -8528 322 --         Vent Sett BUN 34* 32* 31*   CREATSERUM 0.60* 0.62* 0.59*   GFRAA 104 102 104   GFRNAA 90 89 90   CA 7.7* 7.7* 7.7*    143 140   K 4.6 4.5 4.4    105 103   CO2 31.0 33.0* 34.0*     Recent Labs   Lab 07/28/21  0532 07/29/21  0429 07/30/21  0420   RBC 2.6 improving on the left. Diffuse ground-glass opacification is probably related to pulmonary edema and atelectasis from the effusions, however, superimposed pneumonitis is not excluded.     Dictated by (CST): Lakisha Dodd MD on 7/28/2021 at 8:55 AM     Summer Velez The atrium was severely dilated. 4. Right atrium: The atrium was moderately dilated.  Compared to the previous study these findings represent indicate worsening. ------------------------------------------------------------------- Study data:  Comparison was gradient (D): 2mm Hg. Left atrium:  The atrium was severely dilated. Right ventricle: The cavity size was normal. Pacer wire or catheter noted in right ventricle. Systolic function was normal. Pulmonic valve:   Not well visualized.   The valve appears to b Ventricular septum             Value        02/17/2018 Reference  IVS thickness, ED      (H)     1.2   cm     1.4        0.6 - 1.0   LVOT                           Value        02/17/2018 Reference  LVOT ID, S                     2.2   cm     ---------- -- Legend: (L)  and  (H)  denny values outside specified reference range. Electronically signed       07/06/2021 13:23 Carlton Correa MD    Exam:     General: Intubated  HEENT: Normocephalic, anicteric sclera, neck supple.   Neck: No JVD, carotids 2+, no bruit Garett  7/30/2021

## 2021-07-30 NOTE — PROGRESS NOTES
[facility]    Progress Note    Liam Coon Patient Status:  Inpatient    1932 MRN [id number]   Location [facility] 2W/SW Attending Jonathon Abreu DO   Hosp Day # 25 PCP Danae Watkins MD       Subjective:   Liam Coon (DIPRIVAN) infusion, 5-100 mcg/kg/min (Dosing Weight), Intravenous, Continuous  glucose (DEX4) oral liquid 15 g, 15 g, Oral, Q15 Min PRN   Or  Glucose-Vitamin C (DEX-4) chewable tab 4 tablet, 4 tablet, Oral, Q15 Min PRN   Or  dextrose 50 % injection 50 mL, 05/17/2021       XR CHEST AP PORTABLE  (CPT=71045)    Result Date: 7/30/2021  CONCLUSION:  1. Borderline cardiomegaly. Tortuous aorta.  2. Bilateral mixed alveolar and interstitial airspace disease consistent with pulmonary congestive failure, pulmonary ed

## 2021-07-31 NOTE — PROGRESS NOTES
[facility]  Neurology Progress Note    Dwainne Shaggy Patient Status:  Inpatient    1932 MRN [id number]   Location [facility] 3W/SW Attending Radha Downs MD   [facility] Day # 32 PCP Danae Watkins MD     Subjective:  Dwainne Shaggy is a(n extubated. Generalized weakness. Some restlessness.     ferrous sulfate 300 (60 Fe) MG/5ML syrup 324 mg, 324 mg, Oral, Daily  furosemide (LASIX) injection 40 mg, 40 mg, Intravenous, BID (Diuretic)  0.9% NaCl infusion, , Intravenous, Once  ipratropium-albu per day  acetaminophen (TYLENOL) tab 650 mg, 650 mg, Oral, Q6H PRN  ondansetron HCl (ZOFRAN) injection 4 mg, 4 mg, Intravenous, Q6H PRN  LORazepam (ATIVAN) tab 0.5 mg, 0.5 mg, Oral, Daily PRN        Objective:  Blood pressure 129/66, pulse 70, temperature PHOS 3.4 07/28/2021    TROP <0.045 07/07/2021    CK 37 (L) 07/05/2021    B12 343 05/17/2021       XR CHEST AP PORTABLE  (CPT=71045)    Result Date: 7/31/2021  CONCLUSION:   Borderline cardiomegaly with pulmonary vascular congestion and layering bilateral p antiplatelet therapy. Brainstem stroke could not be ruled out, unfortunately MRI is not possible due to his defibrillator.     However considering the diurinal changes for last few months, slurring of speech, difficulty with respirations and ptosis there c

## 2021-07-31 NOTE — PLAN OF CARE
Patient intubated, no sedation. Patient attempting to grab tubing and lines. Patient remains in bilateral soft wrist restraints. Patient repositioned. Will continue to monitor.    Problem: Safety Risk - Non-Violent Restraints  Goal: Patient will remain free

## 2021-07-31 NOTE — PLAN OF CARE
Patient intubated, no sedation. Drowsy, but able to follow commands. Generalized weakness in all extremities. Patient remains in bilateral soft wrist restraints. FiO2 40%. Moderate amount of oral secretions. Tube feeds tolerated.  Adequate urine output from Swallowing  Goal: Minimize aspiration risk  Description: Interventions:  - Patient should be alert and upright for all feedings (90 degrees preferred)  - Offer food and liquids at a slow rate  - No straws  - Encourage small bites of food and small sips of stability  Description: INTERVENTIONS:  - Monitor vital signs, rhythm, and trends  - Monitor for bleeding, hypotension and signs of decreased cardiac output  - Evaluate effectiveness of vasoactive medications to optimize hemodynamic stability  - Monitor ar

## 2021-07-31 NOTE — PROGRESS NOTES
[facility] NOTE      Neeta Child Patient Status:  Inpatient    1932 MRN [id number]   Location [facility] 2W/SW Attending Nicola Sanderson # 26 PCP Konstantin Shay MD         Assessment and Pl normal respiratory effort  CV: irregular rhythm,no pathologic murmur  Abd: Abdomen soft, nontender, nondistended,  bowel sounds present  Ext:  no clubbing, no cyanosis  Neuro: no focal deficits  Skin: no rashes or lesions      Scheduled Meds:   • ferrous s Multiple support devices not significantly changed from prior, as outlined above.      Dictated by (CST): Nanda Darnell MD on 7/31/2021 at 7:49 AM     Finalized by (CST): Nanda Darnell MD on 7/31/2021 at 7:53 AM          XR CHEST AP PORTABLE  (CPT=71045)

## 2021-07-31 NOTE — PROGRESS NOTES
Pulmonary/Critical Care Follow Up Note    HPI:   Scott Lezama is a 80year old male with Patient presents with:  Fall  Hip Pain      PCP Christiano Silva MD  Admission Attending Valentin Mendenhall 15 Day #26    Intubated and sedated    No events Oral, Q15 Min PRN **OR** Glucose-Vitamin C (DEX-4) chewable tab 8 tablet, 8 tablet, Oral, Q15 Min PRN  •  Cetirizine HCl (ZYRTEC) tab 5 mg, 5 mg, Oral, Daily  •  dextrose 10 % infusion, , Intravenous, Continuous PRN  •  acetaminophen (TYLENOL) tab 650 mg, 07/29/21  0429 07/30/21  0420 07/31/21  0405   * 112* 111*   BUN 32* 31* 29*   CREATSERUM 0.62* 0.59* 0.53*   GFRAA 102 104 109   GFRNAA 89 90 94   CA 7.7* 7.7* 7.7*    140 140   K 4.5 4.4 4.4    103 103   CO2 33.0* 34.0* 33.0*       Lab

## 2021-07-31 NOTE — PLAN OF CARE
Bilateral soft wrist restraints remain in place. Safety maintained.        Problem: Safety Risk - Non-Violent Restraints  Goal: Patient will remain free from self-harm  Description: INTERVENTIONS:  - Apply the least restrictive restraint to prevent harm  -

## 2021-07-31 NOTE — RESPIRATORY THERAPY NOTE
Patient received intubated with a 7.5 ETT secured at 25 at the lips on vent with the following settings; VC/AC 20 400 40% +5. Bilateral BS heard and  suction is being provided as needed. Patient is tolerating vent well and maintaining SpO2 of 100%.  RT will

## 2021-08-01 NOTE — PROGRESS NOTES
[facility] NOTE      Yuri Chavez Patient Status:  Inpatient    1932 MRN [id number]   Location [facility] 2W/SW Attending Diane Acevedo DO   Hosp Day # 32 PCP Radha Krishna MD     Agitated overnight on respiratory effort  CV: Heart with regular rate and rhythm,no pathologic murmur  Abd: Abdomen soft, nontender, nondistended,  bowel sounds present  Ext:  no clubbing, no cyanosis  Neuro: no focal deficits  Skin: no rashes or lesions      Scheduled Meds: also increasing. Dictated by (CST): Isabella Martin MD on 8/01/2021 at 9:14 AM     Finalized by (CST): Isabella Martin MD on 8/01/2021 at 9:18 AM          XR CHEST AP PORTABLE  (CPT=71045)    Result Date: 7/31/2021  CONCLUSION:  1.  Dobbhoff tube with tip p

## 2021-08-01 NOTE — PROGRESS NOTES
Pulmonary/Critical Care Follow Up Note    HPI:   Yaneli Jensen is a 80year old male with Patient presents with:  Fall  Hip Pain      PCP Tanner Hernandez, MD  Admission Attending Lisa Lilly MD    Hospital Day #27    Intubated and sedated    Tolerating S (DEX4) oral liquid 30 g, 30 g, Oral, Q15 Min PRN **OR** Glucose-Vitamin C (DEX-4) chewable tab 8 tablet, 8 tablet, Oral, Q15 Min PRN  •  Cetirizine HCl (ZYRTEC) tab 5 mg, 5 mg, Oral, Daily  •  dextrose 10 % infusion, , Intravenous, Continuous PRN  •  aceta * 111* 182*   BUN 31* 29* 32*   CREATSERUM 0.59* 0.53* 0.65*   GFRAA 104 109 100   GFRNAA 90 94 87   CA 7.7* 7.7* 7.9*    140 140   K 4.4 4.4 4.4    103 103   CO2 34.0* 33.0* 34.0*       Lab Results   Component Value Date    HGB 8.1 (L

## 2021-08-01 NOTE — PROGRESS NOTES
[facility]  Neurology Progress Note    Cha Alexsandra Patient Status:  Inpatient    1932 MRN [id number]   Location [facility] 3W/SW Attending Jayson Wilkinson MD   [facility] Day # 32 PCP Jassi Espinoza MD     Subjective:  Cha Upton is a(n extubated. Generalized weakness. Some restlessness.     methylPREDNISolone Sodium Succ (Solu-MEDROL) injection 60 mg, 60 mg, Intravenous, Q12H  ferrous sulfate 300 (60 Fe) MG/5ML syrup 324 mg, 324 mg, Oral, Daily  furosemide (LASIX) injection 40 mg, 40 mg Sodium (Porcine) 5000 UNIT/ML injection 5,000 Units, 5,000 Units, Subcutaneous, 2 times per day  acetaminophen (TYLENOL) tab 650 mg, 650 mg, Oral, Q6H PRN  ondansetron HCl (ZOFRAN) injection 4 mg, 4 mg, Intravenous, Q6H PRN  LORazepam (ATIVAN) tab 0.5 mg, 140 08/01/2021    K 4.4 08/01/2021     08/01/2021    CO2 34.0 (H) 08/01/2021     (H) 08/01/2021    CA 7.9 (L) 08/01/2021    ALB 3.1 (L) 07/27/2021    ALKPHO 52 07/22/2021    BILT 0.5 07/22/2021    TP 4.7 (L) 07/22/2021    AST 43 (H) 07/22/2021 List:     Pacemaker- [place] [employer]     CAD (coronary artery disease)     History of atrial fibrillation     History of colonic polyps     GIB (gastrointestinal bleeding)     Acute ischemic stroke (HCC)     Hyperglycemia     CVA (cerebral vascular accident) (H

## 2021-08-01 NOTE — PLAN OF CARE
Patient remains intubated. Patient not sedated, able to follow commands, alert. Spontaneous breathing trial for 2hr 45minutes, full support resumed after pt met with Dr Jabari Vickers. Patient's wife and daughter updated by phone, all questions answered.  They are i assistive/communication devices  Outcome: Not Progressing     Problem: Impaired Swallowing  Goal: Minimize aspiration risk  Description: Interventions:  - Patient should be alert and upright for all feedings (90 degrees preferred)  - Offer food and liquids 80 by Rico Walls RN  Outcome: Progressing  8/1/2021 0745 by Rico Walls RN  Outcome: Progressing  8/1/2021 0742 by Rico Walls RN  Outcome: Progressing     Problem: CARDIOVASCULAR - ADULT  Goal: Maintains optimal cardiac output and hemodynamic sta

## 2021-08-01 NOTE — PLAN OF CARE
Patient remains in bilateral soft wrist restraints. Safety maintained.        Problem: Safety Risk - Non-Violent Restraints  Goal: Patient will remain free from self-harm  Description: INTERVENTIONS:  - Apply the least restrictive restraint to prevent harm

## 2021-08-01 NOTE — PROGRESS NOTES
Patient received on vent settings noted, no vent changes made overnight, pt sx as needed, RT will continue to monitor pt.      08/01/21 0320   Vent Information   $ RT Standby Charge (per 15 min) 1  (vent check)   $ Vent Care / Non-Invasive Subsequent Day Y

## 2021-08-01 NOTE — PROCEDURES
HISTORY:    You Summers is a 80year old male with a complaint of generalized weakness, respiratory failure. Requirement for intubation    PHYSICAL:    Please refer to the progress note. .    TECHNICAL SUMMARY:    There were no significant technical diff Normal N N N N   R. Biceps femoris (short head) Sciatic (peroneal division) L5-S2 N None None None None Normal N N N N   R. Gastrocnemius (Medial head) Tibial S1-S2 N None None None None Normal N N N N   R.  Tibialis anterior Deep peroneal (Fibular) L4-L5 N

## 2021-08-01 NOTE — PLAN OF CARE
Pt alert w/ restlessness at times requiring prn dose of ativan x1 & continued need for BUE soft wrist restraints overnight. Pt able to follow simple commands at times w/ purposeful movements noted in all extremities.  VSS on FiO2 40%, tolerating vent settin patient fatigue and changes in neurological status  - Encourage and assist patient to increase activity and self care with guidance from PT/OT  - Encourage visually impaired, hearing impaired and aphasic patients to use assistive/communication devices  Out and instruct to report SOB or any respiratory difficulty  - Respiratory Therapy support as indicated  - Manage/alleviate anxiety  - Monitor for signs/symptoms of CO2 retention  Outcome: Progressing     Problem: CARDIOVASCULAR - ADULT  Goal: Maintains optim RN  Outcome: Progressing

## 2021-08-02 PROBLEM — Z51.5 PALLIATIVE CARE BY SPECIALIST: Status: ACTIVE | Noted: 2021-01-01

## 2021-08-02 PROBLEM — Z71.89 COUNSELING REGARDING ADVANCE CARE PLANNING AND GOALS OF CARE: Status: ACTIVE | Noted: 2021-01-01

## 2021-08-02 NOTE — SPIRITUAL CARE NOTE
Visited while rounding, pt not alert and family not present. Left card. Chaplains are available 24 hours a day at [phone]. Rev. Elizabeth Khan [facility]  Ext. [phone]

## 2021-08-02 NOTE — PLAN OF CARE
Patient remains intubated. Patient not sedated, able to follow commands, alert. Spontaneous breathing trial for over 5 hours, returned to full support due to drowsiness and low respiratory rate.  Patient's wife and daughter updated by phone, all questions a Progressing  Goal: Achieves maximal functionality and self care  Description: INTERVENTIONS  - Monitor swallowing and airway patency with patient fatigue and changes in neurological status  - Encourage and assist patient to increase activity and self care broncho-pulmonary hygiene including cough, deep breathe, Incentive Spirometry  - Assess the need for suctioning and perform as needed  - Assess and instruct to report SOB or any respiratory difficulty  - Respiratory Therapy support as indicated  - Manage/a ordered  - Initiate emergency measures for life threatening arrhythmias  - Monitor electrolytes and administer replacement therapy as ordered  Outcome: Progressing

## 2021-08-02 NOTE — PROGRESS NOTES
[facility]  Neurology Progress Note    Levi Alfredo Patient Status:  Inpatient    1932 MRN [id number]   Location [facility] 3W/SW Attending Makayla Ware MD   [facility] Day # 28 PCP Shayna Rodriguez MD     Subjective:  Levi Alfredo is a(n in the right upper or lower extremities noted. Repetitive stimulation of the right APB muscle did not show any obvious decrement.   However it might have been limited by the fact that it was a very distal muscle as well as patient might have improved    ip mg, 10 mg, Intravenous, Q2H PRN  aspirin 300 MG rectal suppository 300 mg, 300 mg, Rectal, Daily   Or  aspirin tab 325 mg, 325 mg, Oral, Daily  atorvastatin (LIPITOR) tab 40 mg, 40 mg, Oral, Nightly  Heparin Sodium (Porcine) 5000 UNIT/ML injection 5,000 Un depressed.                         Lab Results   Component Value Date    WBC 4.6 08/02/2021    HGB 8.3 (L) 08/02/2021    HCT 26.3 (L) 08/02/2021    .0 08/02/2021    CREATSERUM 0.64 (L) 08/02/2021    BUN 40 (H) 08/02/2021     08/02/2021    K 4.4 8:56 PM     Finalized by (CST): Rosita Mcgregor MD on 7/31/2021 at 9:00 PM                  Assessment:  Patient Active Problem List:     Pacemaker- [place] [employer]     CAD (coronary artery disease)     History of atrial fibrillation     History of colonic poly might have improved      Prognosis remains cautious. But patient did well with the breathing trial yesterday.     Jaquelin Mark

## 2021-08-02 NOTE — DIETARY NOTE
ADULT NUTRITION REASSESSMENT    Pt is at high nutrition risk. Pt meets moderate malnutrition criteria.       CRITERIA FOR MALNUTRITION DIAGNOSIS:  Criteria for non-severe malnutrition diagnosis: chronic illness related to energy intake less than75% for gre blood in stool and for C. Diff toxin. Hypernatremia- calculated FWD: ~ 1 L. FWF increased to 200 ml q 4 hrs X 24 hrs and will adjust in future pending labs.  Will change tube feeds to  Non-fiber Osmolite 1.2 same rate (no fiber in view of potential GIB and small BM last 24 hrs,loose brown BMs,   small bore feeding tube placed 8/1. MEDICATIONS: reviewed . Continuous: off propofol.    • norepinephrine Stopped (07/09/21 1634)   • propofol 10 mcg/kg/min (07/25/21 0155)   • dextrose Stopped (07/20/21 1600) pressure injury, Coccyx and rt foot skin tear. ANTHROPOMETRICS:  HT: 177.8 cm (5' 10\")  WT: 77.2 kg (170 lb 3.1 oz) on 8/2  True wt lower noting edema. 7/29 Suspect fluid wt gain ~ 23# or 12.6%, being diuresed.      (72.5 kg possible true wt per wt pro, 1262 ml. FWF: 100 ml q 4 hours (600 ml). Total H2O: 1862 ml. Met 102% of max kcal and 100% of protein needs or >100% RDIs.   - RD Malnutrition Care Plan: Enteral nutrition to meet nutritional needs.    - Meals and snacks: NPO  - Medical Food Supple

## 2021-08-02 NOTE — CONSULTS
Luis  [id number]  Hospital Day #28  Date of Consult: 08/02/21  Patient seen at: [address]    Reason for Consultation:      Consult requested by Dr Marce Alaniz for evalu bedside. Jd Jefferson was able to nod to my questions. I explained that the questions are very hard and involved life and death and his need for intubation and MV. He appeared to understand my questions. He would turn his head away when I asked them.      Past Pantoprazole Sodium (PROTONIX) 40 mg in Sodium Chloride (PF) 0.9 % 10 mL IV push, 40 mg, Intravenous, Q12H  •  vancomycin HCl (FIRVANQ) 50 MG/ML oral solution 125 mg, 125 mg, Per NG Tube, BID  •  metoprolol tartrate (LOPRESSOR) tab 100 mg, 100 mg, Per NG T Hematology:  Lab Results   Component Value Date    WBC 4.6 08/02/2021    HGB 8.3 (L) 08/02/2021    HCT 26.3 (L) 08/02/2021    .0 08/02/2021       Coags:No results found for: PT, INR, PTT  Chemistry:  Lab Results   Component Value Date    [facility] as above. 5. Lesser incidental findings as above.     Dictated by (CST): Marita Wood MD on 7/31/2021 at 8:56 PM     Finalized by (CST): Marita Wood MD on 7/31/2021 at 9:00 PM          XR CHEST AP PORTABLE  (CPT=71045)    Res Consciousness   100 Full Normal Full   Normal Full   90 Full No disease  Normal Full Normal Full   80 Full Some disease  Normal w/effort Full Normal or  Reduced Full   70 Reduced Some disease  Can’t perform job Full Normal or   Reduced Full   60 Reduced Si Evert Jacobson will be visiting him tomorrow in his room around 11 AM.    He will let ICU RN know when he is in so our APN can visit as well and support him as he talks with his dad about the above options.     Advance Care Planning counseling and discussion:   Pa Pranay Starr    Thank you for allowing Palliative Care services to participate in the care of Mr Valrie Felty.     A total of 70 minutes were spent on this consult; which included all of the following: direct face to face contact, history taking, physical examination and

## 2021-08-02 NOTE — PROGRESS NOTES
[facility]  Cardiology Progress Note    Ernesto Pizarro Patient Status:  Inpatient    1932 MRN [id number]   Location [facility] 2W/SW Attending Angela Stoddard,    Hosp Day # 28 PCP Charis Ceron MD     Subjective     In Feeding Tube NG Right nare) 460 -- --    Total Intake 0261 2594 90       Output    Urine  2750 2050  --    Output (mL) (Urethral Catheter Latex) 2750 2050 --    Emesis/NG output  0  0  --    Emesis Occurrence 0 x 0 x --    Output (mL) (Feeding Tube NG -sm 07/22/2021    T4F 0.9 07/31/2021    TSH 3.170 07/31/2021    ESRML 21 07/31/2021    MG 2.5 07/22/2021    PHOS 3.4 07/28/2021    TROP <0.045 07/07/2021     07/31/2021    B12 343 05/17/2021       Recent Labs   Lab 07/31/21  0405 08/01/21  0529 08/02/21 interstitial edema. 4. Additional support tubes and lines as above. 5. Lesser incidental findings as above.     Dictated by (CST): Dolores Abraham MD on 7/31/2021 at 8:56 PM     Finalized by (CST): Dolores Abraham MD on 7/31/2021 at 9:00 PM          [facility] was    severely reduced. The estimated ejection fraction was 20-25%, by    visual assessment. Mild diffuse hypokinesis with distinct    regional wall motion abnormalities.  The study is not technically    sufficient to allow evaluation of LV diastolic funct inferior myocardium. The study is not technically sufficient to allow evaluation of LV diastolic function. Aortic valve:   Structurally normal valve. Trileaflet; normal thickened leaflets. Doppler: There was no stenosis. No regurgitation. Aorta:   Aor - 75  volume/bsa, Teichsteffenz  MM  LV e', lateral                 8.97  cm/sec 4.29       -----------  LV E/e', lateral               8.4          7.2        -----------  LV e', medial                  5.65  cm/sec 6.82       -----------  LV E/e', medial ml     ---------- -----------  RA volume/bsa, ES, A/L (H)     44.9  ml/m^2 ---------- 11.0 - 39.0   Systemic veins                 Value        02/17/2018 Reference  Estimated CVP                  8     mm Hg  3          -----------   Right ventricle prior history of GIB     CAD s/p PCI 2005  - Continue ASA HI.   - plavix stopped given melanotic stools  - s/p EGD with small non-bleeding erosion esophagus/duodenum     Melanotic stools/GIB  - s/p EGD with small non-bleeding erosion esophagus/duodenum    W

## 2021-08-02 NOTE — PLAN OF CARE
Uneventful night. Pt alert & restless at times requiring continued use of BUE soft wrist restraints & prn ativan x1. Pt able to follow simple commands and nods head Y/N appropriately when prompted. VSS on FiO2 40%, tolerating vent settings well. Afebrile. Problem: Impaired Swallowing  Goal: Minimize aspiration risk  Description: Interventions:  - Patient should be alert and upright for all feedings (90 degrees preferred)  - Offer food and liquids at a slow rate  - No straws  - Encourage small bites of gina and hemodynamic stability  Description: INTERVENTIONS:  - Monitor vital signs, rhythm, and trends  - Monitor for bleeding, hypotension and signs of decreased cardiac output  - Evaluate effectiveness of vasoactive medications to optimize hemodynamic stabili

## 2021-08-02 NOTE — PLAN OF CARE
Problem: RESPIRATORY - ADULT  Goal: Achieves optimal ventilation and oxygenation  Description: INTERVENTIONS:  - Assess for changes in respiratory status  - Assess for changes in mentation and behavior  - Position to facilitate oxygenation and minimize r Trial Vt 540     8964 :  Patient has been breathing slow, when he sleep, between 7-10, RT will continue to monitor.

## 2021-08-02 NOTE — PLAN OF CARE
Patient remains in bilateral soft wrist restraints. Safety maintained.      Problem: Patient Centered Care  Goal: Patient preferences are identified and integrated in the patient's plan of care  Description: Interventions:  - What would you like us to know

## 2021-08-02 NOTE — PROGRESS NOTES
[facility]    Progress Note    Rutherford Kins Patient Status:  Inpatient    1932 MRN [id number]   Location [facility] 2W/SW Attending Colin Delgado, DO   Hosp Day # 28 PCP Boogie Heaton MD     HPI/Subjective:     Kaykay Woo Date: 8/2/2021  CONCLUSION:  1. Cardiomegaly with moderately advanced CHF similar to August 1, 2021. 2. Cardiomegaly. 3. Pneumo tracheal tube in good position. 4. Pacemaker. 5. Catheters in superior vena cava. 6. Feeding tube. Dictated by (CST):  Zoe myasthenia gravis  Acetylcholine receptor antibody negative  S/p IVIG and plasmapheresis  Per neuro         3- HFrEF LVEF 20 % with apical akinesia / a-fib   H/o ICD   Cardiology following      4-bladder outlet obstruction  Rivas in place        5–DVT prop

## 2021-08-03 NOTE — PALLIATIVE CARE NOTE
[facility]  Palliative Care Follow Up    Good Temolindsay Patient Status:  Inpatient    1932 MRN [id number]   Location [facility] 2W/SW Attending Dar Gee, DO   Hosp Day # 34 PCP Selvin Gleason MD     Date of Consult: 0 consciousness-alert  Orientation: interacting appropriately    Allergies:  No Known Allergies    Medications:     Current Facility-Administered Medications:   •  azaTHIOprine (IMURAN) tab 50 mg, 50 mg, Oral, Daily  •  ipratropium-albuterol (DUONEB) nebuliz (APRESOLINE) injection 10 mg, 10 mg, Intravenous, Q2H PRN  •  aspirin 300 MG rectal suppository 300 mg, 300 mg, Rectal, Daily **OR** aspirin tab 325 mg, 325 mg, Oral, Daily  •  atorvastatin (LIPITOR) tab 40 mg, 40 mg, Oral, Nightly  •  Heparin Sodium ([place] Pneumo tracheal tube in good position. 4. Pacemaker. 5. Catheters in superior vena cava. 6. Feeding tube. Dictated by (CST): Leann Vasquez MD on 8/02/2021 at 7:11 AM     Finalized by (CST):  Leann Vasquez MD on 8/02/2021 at 7:44 AM            Pal

## 2021-08-03 NOTE — PROGRESS NOTES
[facility]    [facility]      Assessment and Plan:     1. Respiratory failure–aspiration pneumonitis with weakness secondary to presumed myasthenic crisis. The patient definitely has pneumonia.   His strength improves significantly when unremarkable with pupils equal round and reactive to light and accommodation. Neck without adenopathy, thyromegaly, JVD nor bruit. Lungs diminished at right base to auscultation and percussion. Cardiac regular rate and rhythm no murmur.    Abdomen nont

## 2021-08-03 NOTE — PLAN OF CARE
Patient alert and able to follow commands. Patient intubated and remains in bilateral soft wrist restraints. FiO2 weaned to 30%. Tube feeds tolerated. Son will come at 80 tomorrow.  SBT and follow up chest xray this AM.   Problem: Patient Centered Care  G preferred)  - Offer food and liquids at a slow rate  - No straws  - Encourage small bites of food and small sips of liquid  - Offer pills one at a time, crush or deliver with applesauce as needed  - Discontinue feeding and notify MD (or speech pathologist) cardiac output  - Evaluate effectiveness of vasoactive medications to optimize hemodynamic stability  - Monitor arterial and/or venous puncture sites for bleeding and/or hematoma  - Assess quality of pulses, skin color and temperature  - Assess for signs o

## 2021-08-03 NOTE — PLAN OF CARE
Patient intubated, no sedation. Patient can be restless at times. Patient remains in bilateral soft wrist restraints. Patient repositioned and resting comfortably. Will continue to monitor.    Problem: Safety Risk - Non-Violent Restraints  Goal: Patient salina

## 2021-08-03 NOTE — PROGRESS NOTES
[facility] AND [facility]  Neurology Progress Note    Destiny Leonardo Patient Status:  Inpatient    1932 MRN [id number]   Location [facility] 3W/SW Attending Florentino Pelayo MD   [facility] Day # 34 PCP Chasidy Fuller MD     Subjective:  Destiny Leonardo is a(n in the right upper or lower extremities noted. Repetitive stimulation of the right APB muscle did not show any obvious decrement. However it might have been limited by the fact that it was a very distal muscle as well as patient might have improved.     P 650 mg, Oral, Q4H PRN   Or  acetaminophen (TYLENOL) 650 MG rectal suppository 650 mg, 650 mg, Rectal, Q4H PRN  Labetalol HCl (TRANDATE) injection 10 mg, 10 mg, Intravenous, Q10 Min PRN  hydrALAzine HCl (APRESOLINE) injection 10 mg, 10 mg, Intravenous, Q2H possibility of significant amount of adipose tissue. However some temporal wasting might be appreciated. No fasciculations however. No significant hyperreflexia. If anything his reflexes were depressed.                         Lab Results   Component Va declining. Originally was more concerning for left-sided weakness, but appears to be much more generalized, he does have ptosis on the left side. Therefore stroke was originally suspected.   Patient does have history of atrial fibrillation, he will need

## 2021-08-04 NOTE — PLAN OF CARE
Patient intubated w/o sedation. CPAP trial lasted from 2755-8574. Bilateral wrist restraints in place. Skin intact. Call light in reach and safety education provided.      Problem: Patient Centered Care  Goal: Patient preferences are identified and integrat straws  - Encourage small bites of food and small sips of liquid  - Offer pills one at a time, crush or deliver with applesauce as needed  - Discontinue feeding and notify MD (or speech pathologist) if coughing or persistent throat clearing or wet/gurgly v medications to optimize hemodynamic stability  - Monitor arterial and/or venous puncture sites for bleeding and/or hematoma  - Assess quality of pulses, skin color and temperature  - Assess for signs of decreased coronary artery perfusion - ex.  Angina  - E

## 2021-08-04 NOTE — PROGRESS NOTES
[facility]    Progress Note      Assessment and Plan:     1. Respiratory failure–aspiration pneumonitis with weakness secondary to presumed myasthenic crisis. The patient is much improved clinically.   He is out of bed breathing comfortabl bruit.   Lungs diminished at right base to auscultation and percussion. Cardiac regular rate and rhythm no murmur. Abdomen nontender, without hepatosplenomegaly and no mass appreciable. Extremities without clubbing cyanosis nor edema.    Neurologic str

## 2021-08-04 NOTE — PLAN OF CARE
Patient tolerated spontaneous breathing trial all day today. To remain on SBT per Dr. Leslie Leong unless in case of respiratory distress. Plan for possible extubation tomorrow. Son to meet with patient, RN and Dr. Leslie Leong to confirm wishes prior to extubation.  Rhett Sampson self care with guidance from PT/OT  - Encourage visually impaired, hearing impaired and aphasic patients to use assistive/communication devices  Outcome: Progressing     Problem: Impaired Swallowing  Goal: Minimize aspiration risk  Description: Jesuso Manage/alleviate anxiety  - Monitor for signs/symptoms of CO2 retention  Outcome: Progressing     Problem: Safety Risk - Non-Violent Restraints  Goal: Patient will remain free from self-harm  Description: INTERVENTIONS:  - Apply the least restrictive restr

## 2021-08-04 NOTE — PROGRESS NOTES
[facility] AND [facility]  Neurology Progress Note    Destiny Leonardo Patient Status:  Inpatient    1932 MRN [id number]   Location [facility] 3W/SW Attending Florentino Pelayo MD   [facility] Day # 27 PCP Chasidy Fuller MD     Subjective:  Destiny Leonardo is a(n in the right upper or lower extremities noted. Repetitive stimulation of the right APB muscle did not show any obvious decrement. However it might have been limited by the fact that it was a very distal muscle as well as patient might have improved.     P 650 mg, Oral, Q4H PRN   Or  acetaminophen (TYLENOL) 650 MG rectal suppository 650 mg, 650 mg, Rectal, Q4H PRN  Labetalol HCl (TRANDATE) injection 10 mg, 10 mg, Intravenous, Q10 Min PRN  hydrALAzine HCl (APRESOLINE) injection 10 mg, 10 mg, Intravenous, Q2H significant amount of adipose tissue. However some temporal wasting might be appreciated. No fasciculations however. No significant hyperreflexia. If anything his reflexes were depressed.                         Lab Results   Component Value Date    WBC Acute ischemic stroke (HCC)     Hyperglycemia     CVA (cerebral vascular accident) ([facility]     Myasthenia gravis ([facility] [facility]     Malnutrition ([facility]     Dark stools     Anemia     Acute respiratory failure with hypoxia (HCC)     Generalized weakness     Palliative

## 2021-08-04 NOTE — PLAN OF CARE
Patient intubated w/o sedation. Unable to assess orientation status but patient nodding appropriately and following commands. Patient understanding of need for bilateral wrist restraints.      Problem: Safety Risk - Non-Violent Restraints  Goal: Patient salina

## 2021-08-04 NOTE — PROGRESS NOTES
08/04/21 0835   Wound 07/23/21 Pressure Injury Ear Left   Date First Assessed/Time First Assessed: 07/23/21 1600   Present on Hospital Admission: No  Primary Wound Type: Pressure Injury  Location: Ear  Wound Location Orientation: Left  Wound Description Quit date: 56        Years since quittin.6      Smokeless tobacco: Never Used    Vaping Use      Vaping Use: Never used    Alcohol use: No      Alcohol/week: 0.0 standard drinks    Drug use: No      PLAN   Recommendations:  Pulmonary, CV, Neuro & Pa 142 08/04/2021    K 4.2 08/04/2021     08/04/2021    CO2 37.0 (H) 08/04/2021     (H) 08/04/2021    CA 7.8 (L) 08/04/2021    ALB 3.1 (L) 07/27/2021    ALKPHO 52 07/22/2021    BILT 0.5 07/22/2021    TP 4.7 (L) 07/22/2021    AST 43 (H) 07/22/2021

## 2021-08-04 NOTE — PALLIATIVE CARE NOTE
[facility]  Palliative Care Follow Up    [facility] Patient Status:  Inpatient    1932 MRN [id number]   Location [facility] 2W/SW Attending Graeme Boxer, DO   Hosp Day # 27 PCP Dipak Hill MD     Date of Consult: 0 ml   Net 612 ml       Physical Exam: (from this morning during initial visit)  General: chronically ill, comfortable, in no apparent distress, somewhat frail appearing  Nutritional status: very thin  HEENT: + ETT  Cardiac: paced rhythm on monitor  Lungs: o tab 8 tablet, 8 tablet, Oral, Q15 Min PRN  •  Cetirizine HCl (ZYRTEC) tab 5 mg, 5 mg, Oral, Daily  •  dextrose 10 % infusion, , Intravenous, Continuous PRN  •  acetaminophen (TYLENOL) tab 650 mg, 650 mg, Oral, Q4H PRN **OR** acetaminophen (TYLENOL) 650 MG MD NIKKI on 8/04/2021 at 7:47 AM          XR CHEST AP PORTABLE  (CPT=71045)    Result Date: 8/3/2021  CONCLUSION:  1. Redemonstration of heart size upper limits of normal, there is moderate pulmonary edema pattern bilaterally about the same.   Persistent moder of the following:direct face to face contact, history taking, physical examination, and >50% was spent counseling and coordinating care. Discussed today's visit with RN 6 [facility] and Dr. Hattie Schmid.      Thank you for allowing the Palliative Care Services team to

## 2021-08-04 NOTE — RESPIRATORY THERAPY NOTE
Pt on current vent settings VC/20/400/+5/30% ; ETT 7.5 @ 25 cm. Suctioned pt as needed throughout the night. Pt tolerating and saturating appropriately. No acute changes overnight. RT to continue to monitor.

## 2021-08-04 NOTE — RESPIRATORY THERAPY NOTE
PT was received intubated on full support on vent. PT was placed on pressure support upon first vent check, 10/5. PT was left on throughout day until PT and family was okay to extubate with no intentions of reintubation.  PT placed on 5L NC. RT to continue

## 2021-08-05 NOTE — PLAN OF CARE
Problem: Patient Centered Care  Goal: Patient preferences are identified and integrated in the patient's plan of care  Description: Interventions:  - What would you like us to know as we care for you?  My wife has MS, and I can no longer take care of her, (or speech pathologist) if coughing or persistent throat clearing or wet/gurgly vocal quality is noted  Outcome: Progressing     Problem: Patient/Family Goals  Goal: Patient/Family Long Term Goal  Description: Patient's Long Term Goal: return home    Inter temperature  - Assess for signs of decreased coronary artery perfusion - ex.  Angina  - Evaluate fluid balance, assess for edema, trend weights  Outcome: Progressing  Goal: Absence of cardiac arrhythmias or at baseline  Description: INTERVENTIONS:  - Contin

## 2021-08-05 NOTE — PALLIATIVE CARE NOTE
[facility]  Palliative Care Follow Up    You Summers Patient Status:  Inpatient    1932 MRN [id number]   Location [facility] 2W/SW Attending Jose Luis Hampton,    Hosp Day # 32 PCP Benedicto Stanley MD     Date of Consult: 0 Oral, Daily  •  ipratropium-albuterol (DUONEB) nebulizer solution 3 mL, 3 mL, Nebulization, 2 times daily  •  methylPREDNISolone Sodium Succ (Solu-MEDROL) injection 60 mg, 60 mg, Intravenous, Q12H  •  ferrous sulfate 300 (60 Fe) MG/5ML syrup 324 mg, 324 mg Subcutaneous, 2 times per day  •  acetaminophen (TYLENOL) tab 650 mg, 650 mg, Oral, Q6H PRN  •  ondansetron HCl (ZOFRAN) injection 4 mg, 4 mg, Intravenous, Q6H PRN  •  LORazepam (ATIVAN) tab 0.5 mg, 0.5 mg, Oral, Daily PRN  No current outpatient medication Slightly increased opacification/posteriorly layering pleural effusion in the right lower lung.     Dictated by (CST): Man Paz MD on 8/04/2021 at 7:45 AM     Finalized by (CST): Man Paz MD on 8/04/2021 at 7:47 AM            P

## 2021-08-05 NOTE — PLAN OF CARE
Bedside thora >1L out. Confirmed with neurology, ok to remove RIJ catheter as there are no plans for plasmapharesis in the immediate future. Pt requiring frequent nasal-tracheal suctioning in the evening.  Pt experiencing large amounts of secretions but Peder Gutter should be alert and upright for all feedings (90 degrees preferred)  - Offer food and liquids at a slow rate  - No straws  - Encourage small bites of food and small sips of liquid  - Offer pills one at a time, crush or deliver with applesauce as needed  - Monitor for bleeding, hypotension and signs of decreased cardiac output  - Evaluate effectiveness of vasoactive medications to optimize hemodynamic stability  - Monitor arterial and/or venous puncture sites for bleeding and/or hematoma  - Assess quality of

## 2021-08-05 NOTE — PROGRESS NOTES
[facility]  Neurology Progress Note    Lazara Schneider Patient Status:  Inpatient    1932 MRN [id number]   Location [facility] 3W/SW Attending Flaquito Clay MD   [address] Day # 32 PCP Paulina Villalobos MD     Subjective:  Lazara Schneider is a(n in the right upper or lower extremities noted. Repetitive stimulation of the right APB muscle did not show any obvious decrement. However it might have been limited by the fact that it was a very distal muscle as well as patient might have improved.     P PRN  Labetalol HCl (TRANDATE) injection 10 mg, 10 mg, Intravenous, Q10 Min PRN  hydrALAzine HCl (APRESOLINE) injection 10 mg, 10 mg, Intravenous, Q2H PRN  aspirin 300 MG rectal suppository 300 mg, 300 mg, Rectal, Daily   Or  aspirin tab 325 mg, 325 mg, Ora 08/05/2021    ALB 3.1 (L) 07/27/2021    ALKPHO 52 07/22/2021    BILT 0.5 07/22/2021    TP 4.7 (L) 07/22/2021    AST 43 (H) 07/22/2021    ALT 47 07/22/2021    T4F 0.9 07/31/2021    TSH 3.170 07/31/2021    ESRML 21 07/31/2021    MG 2.5 07/22/2021    PHOS 3.4 ([facility] [address]     Dark stools     Anemia     Acute respiratory failure with hypoxia (HCC)     Generalized weakness     Palliative care by specialist     Counseling regarding advance care planning and goals of care    However considering the diurinal changes for la

## 2021-08-05 NOTE — PROGRESS NOTES
[facility]    Progress Note      Assessment and Plan:     1. Respiratory failure–aspiration pneumonitis with weakness secondary to presumed myasthenic crisis. The patient is much improved clinically. He tolerated extubation.   His voice i mass appreciable. Extremities without clubbing cyanosis nor edema. Neurologic stronger today and much more expressive in the face.   Skin without gross abnormality     Results:     Lab Results   Component Value Date    WBC 8.0 08/05/2021    HGB 7.9 08/0

## 2021-08-05 NOTE — PROCEDURES
[facility]  Procedure Note  21    Destiny Leonardo Patient Status:  Inpatient    1932 MRN [id number]   Location [facility] 2W/SW Attending Nuha Pineda, DO   Hosp Day # 32 PCP Chasidy Fuller MD     Procedure: Macey Lerma

## 2021-08-05 NOTE — SLP NOTE
SLP orders received and acknowledged. Pt with prolonged intubation and recently extubated on 8/4/21 @0805. Per SLP protocol, SLP to evaluate for VFSS readiness 24 hours post extubation.  Please contact SLP with any questions, concerns, and/or change in stat

## 2021-08-05 NOTE — PROGRESS NOTES
[facility] Pharmacy Note: Route Optimization for Pantoprazole (PROTONIX)    Patient is currently on Pantoprazole (PROTONIX) 40 mg IV every 12 hours.    The patient meets the criteria to convert to the oral equivalent as established by the IV to Oral conversion pro

## 2021-08-06 NOTE — PROGRESS NOTES
Clarification on patient's device: 21 [facility] Pacemaker    Remote check 4/21/2020  [employer] single chamber pacer   Battery 8 yrs   JAYLEN Espino MD     Implants      Pacemaker    Pacemaker - [employer] L300 Accolade - Implanted

## 2021-08-06 NOTE — PROGRESS NOTES
[facility]    Progress Note    Karolina Ogden Patient Status:  Inpatient    1932 MRN [id number]   Location [facility] 2W/SW Attending Arnold Figueroa, DO   Hosp Day # 28 PCP Vernon Velazquez MD     HPI/Subjective:     Lazarus Vasquez 07/31/2021    ESRML 21 07/31/2021    MG 2.5 07/22/2021    PHOS 3.4 07/28/2021    TROP <0.045 07/07/2021     07/31/2021    B12 343 05/17/2021       XR CHEST AP PORTABLE  (CPT=71045)    Result Date: 8/6/2021  CONCLUSION:  1.  Normal heart size and obscu Assessment & Plan:      1-Acute respiratory failure / intubated on vent :     –Aspiration pneumonia  - CVA   –? Myasthenic crisis  –significant Neuromuscular weakness  - severe CHF / HFrEF LVEF 20 %     Required prolonged intubation / initially extubat

## 2021-08-06 NOTE — PLAN OF CARE
Patient alert but confused. Redirection provided to patient. Call light in reach and safety education provided.      Problem: Patient Centered Care  Goal: Patient preferences are identified and integrated in the patient's plan of care  Description: Interven liquid  - Offer pills one at a time, crush or deliver with applesauce as needed  - Discontinue feeding and notify MD (or speech pathologist) if coughing or persistent throat clearing or wet/gurgly vocal quality is noted  Outcome: Progressing     Problem: P arterial and/or venous puncture sites for bleeding and/or hematoma  - Assess quality of pulses, skin color and temperature  - Assess for signs of decreased coronary artery perfusion - ex.  Angina  - Evaluate fluid balance, assess for edema, trend weights  O

## 2021-08-06 NOTE — SLP NOTE
ADULT SWALLOWING EVALUATION    ASSESSMENT    ASSESSMENT/OVERALL IMPRESSION:      Proper PPE worn. Hands sanitized upon entrance/exit Pt room. This BSE was ordered d/t Pt S/P extubation 8/04/21 (prolonged intubation). Currently, Pt NPO with NGT.  [facility] i Per [facility] - OBEY Scale, Pt's swallow function is Level 1 of 7. Collaborated with RN regarding Pt's swallowing plan of care. Rec VFSS prior to initiation of oral diet d/t length of intubation and fluctuating dysphagia throughout this admission.  BSE results/recommend Goals: to eat    SWALLOWING HISTORY  Current Diet Consistency: NPO      OBJECTIVE   ORAL MOTOR EXAMINATION  Dentition: Natural (some missing)  Symmetry: Within Functional Limits  Strength:  (overall reduced)     Range of Motion:  (overall reduced/slow)  Ra

## 2021-08-06 NOTE — PROGRESS NOTES
[facility]  Palliative Care Follow Up    Yuri Chavez  [id number]  Hospital Day #32  Date of Consult: 08/02/21  Patient seen at: [address]    Subjective:      Patient was seen and examined with his ICU RN at the bedside. Known Allergies    Medications:       Current Facility-Administered Medications:   •  Meropenem (MERREM) 500 mg in sodium chloride 0.9% 100 mL IVPB-MBP, 500 mg, Intravenous, Q8H  •  ipratropium-albuterol (DUONEB) nebulizer solution 3 mL, 3 mL, Nebulization aspirin 300 MG rectal suppository 300 mg, 300 mg, Rectal, Daily **OR** aspirin tab 325 mg, 325 mg, Oral, Daily  •  atorvastatin (LIPITOR) tab 40 mg, 40 mg, Oral, Nightly  •  Heparin Sodium (Porcine) 5000 UNIT/ML injection 5,000 Units, 5,000 Units, Subcutan (CPT=71045)    Result Date: 8/5/2021  CONCLUSION:  1. No evidence of pneumothorax status post right thoracentesis.     Dictated by (CST): Tita Rodriguez MD on 8/05/2021 at 9:50 AM     Finalized by (CST): Bobby Paz MD on 8/05/2021 at 9 disease  Normal w/effort Full Normal or  Reduced Full   70 Reduced Some disease  Can’t perform job Full Normal or   Reduced Full   60 Reduced Significant disease  Can’t perform hobby Occasional  Assist Normal or   Reduced Full or confused   50 Mainly sit/l POLST: left blank form in room for Katja Cameron to review with his dad  - Татьяна Rivas  / [facility] [place]. surrogate: Katja Motaing his son per Ray's request. Both his wife and his daughter have chronic debilitating illnesses.     3. Symptoms management: No new medications from ou

## 2021-08-06 NOTE — PROGRESS NOTES
[facility]  Neurology Progress Note    Scott Lezama Patient Status:  Inpatient    1932 MRN [id number]   Location [facility] 3W/SW Attending Jeb Daniel MD   [address] Day # 28 PCP Christiano Silva MD     Subjective:  Scott Lezama is a(n in the right upper or lower extremities noted. Repetitive stimulation of the right APB muscle did not show any obvious decrement. However it might have been limited by the fact that it was a very distal muscle as well as patient might have improved.     P 650 mg, 650 mg, Rectal, Q4H PRN  Labetalol HCl (TRANDATE) injection 10 mg, 10 mg, Intravenous, Q10 Min PRN  hydrALAzine HCl (APRESOLINE) injection 10 mg, 10 mg, Intravenous, Q2H PRN  aspirin 300 MG rectal suppository 300 mg, 300 mg, Rectal, Daily   Or  asp 37.0 (H) 08/06/2021     (H) 08/06/2021    CA 8.1 (L) 08/06/2021    ALB 3.1 (L) 07/27/2021    ALKPHO 52 07/22/2021    BILT 0.5 07/22/2021    TP 4.7 (L) 07/22/2021    AST 43 (H) 07/22/2021    ALT 47 07/22/2021    T4F 0.9 07/31/2021    TSH 3.170 07/31/ Myasthenia gravis ([facility] [address]     Malnutrition ([facility] [address]     Dark stools     Anemia     Acute respiratory failure with hypoxia (HCC)     Generalized weakness     Palliative care by specialist     Counseling regarding advance care planning and goals of care    [facility]

## 2021-08-06 NOTE — PLAN OF CARE
Care team notified of glucose trending up. Pt removed DH this AM. DH replaced, CXR to confirm placement- verified with Dr. Yuri hood to use DH, TF rate adjusted per new orders. Pt tolerating.  Requiring frequent suctioning and deep suctioning for large amou impaired, hearing impaired and aphasic patients to use assistive/communication devices  Outcome: Progressing     Problem: Impaired Swallowing  Goal: Minimize aspiration risk  Description: Interventions:  - Patient should be alert and upright for all feedin of CO2 retention  Outcome: Progressing     Problem: CARDIOVASCULAR - ADULT  Goal: Maintains optimal cardiac output and hemodynamic stability  Description: INTERVENTIONS:  - Monitor vital signs, rhythm, and trends  - Monitor for bleeding, hypotension and si

## 2021-08-06 NOTE — OCCUPATIONAL THERAPY NOTE
OCCUPATIONAL THERAPY EVALUATION - INPATIENT     Room Number: 212/212-A  Evaluation Date: 8/6/2021  Type of Evaluation: Initial  Presenting Problem: CVA    Physician Order: IP Consult to Occupational Therapy  Reason for Therapy: ADL/IADL Dysfunction and Dis with RUE positioned to prevent further subluxation. It is recommended that patient return to bed with use of lift for safety. Alarm left on and call light within reach. Instructed to call nursing staff before getting up.      In this OT evaluation abbi Anemia, iron deficiency    • Anxiety state    • Arrhythmia    • Atherosclerosis of aorta ([address] 3/23/2019    CXR 2/16/18   • Coronary atherosclerosis    • Diverticular disease    • Diverticulosis    • GI bleed    • High blood pressure    • High cholesterol Daily Activity Short Form  How much help from another person does the patient currently need…  -   Putting on and taking off regular lower body clothing?: A Lot  -   Bathing (including washing, rinsing, drying)?: A Lot  -   Toileting, which includes using

## 2021-08-06 NOTE — CM/SW NOTE
PT/OT recommending acute rehab, PMR consult pending. Spoke with patient's son Raul Snell, he is agreeable to plan. Initiated referral via Lc, will send PMR note when available. MARINA/CHERI to email list of options to [email or web address]    KAREN Obrien [phone]

## 2021-08-06 NOTE — DIETARY NOTE
ADULT NUTRITION REASSESSMENT    Pt is at high nutrition risk. Pt meets moderate malnutrition criteria.       CRITERIA FOR MALNUTRITION DIAGNOSIS:  Criteria for non-severe malnutrition diagnosis: chronic illness related to energy intake less than75% for gre Dark stools- (iron was being held) possible GI bleed. (noted X 10 stools on 7/11). No stools prior to this. MD checking for blood in stool and for C. Diff toxin. Hypernatremia- calculated FWD: ~ 1 L.  FWF increased to 200 ml q 4 hrs X 24 hrs and will adjust gayle. Prilosed started last pm therefore per guidelines holding TF BID necessitating increase in tf rate to accommodate off time. Mestinon newly started 8/5- may be cause of new loose BM's.      FOOD/NUTRITION RELATED HISTORY:  Appetite: Poor to fair  [place] 08/05/21  0554 08/06/21  0546   * 198* 225*   BUN 43* 44* 43*   CREATSERUM 0.65* 0.67* 0.69*   CA 7.8* 7.9* 8.1*    143 143   K 4.2 4.2 4.1    102 102   CO2 37.0* 36.0* 37.0*   OSMOCALC 310* 313* 314*       NUTRITION RELATED PHYSICAL FIN (unresolved) on tube feeds. ESTIMATED NUTRITION NEEDS: Dosing wt: 72.5 kg wt on 7/5.     Calories: 1464- 1812 calories/day (Tray state eq or 20-25 calories per kg dosing wt)  Protein:  grams protein/day (1.2-1.5 grams protein per kg dosing wt )  F

## 2021-08-06 NOTE — PHYSICAL THERAPY NOTE
PHYSICAL THERAPY EVALUATION - INPATIENT     Room Number: 212/212-A  Evaluation Date: 8/6/2021  Type of Evaluation: Re-evaluation   Physician Order: PT Eval and Treat    Presenting Problem: acute CVA - re-eval after prolonged intubation period   Reason for (Obs): Daily       PHYSICAL THERAPY MEDICAL/SOCIAL HISTORY     History related to current admission: CVA     Problem List  Principal Problem:    Acute ischemic stroke [facility])  Active Problems:    Hyperglycemia    CVA (cerebral vascular accident) ([facility] [address]    Ivett promotion;Relaxation;Repositioning    COGNITION  · Overall Cognitive Status:  JACKIE - unable to assess    BALANCE  Static Sitting: Poor +  Dynamic Sitting: Poor  Static Standing: Poor -  Dynamic Standing: Dependent    AM-PAC '6-Clicks' INPATIENT SHORT FORM - #4 Patient will tolerate sitting at EOB with grossly CGA for 5+ minutes. Goal #4   Current Status    Goal #5 Patient to demonstrate independence with home activity/exercise instructions provided to patient in preparation for discharge.    Goal #5   Kiley

## 2021-08-06 NOTE — CONSULTS
Physician Medicine & Rehabilitation Consult Note         SUBJECTIVE:    Chief Complaint: To assess rehabilitation needs following Mobility and ADL dysfunction s/p Acute ischemic stroke [facility]) as per request of Dr. Peter Alcala. Keshav Sood     History of Present Illness: n.p.o. with plans for VFSS. Patient with Rivas due to bladder outlet obstruction. CODE STATUS: DNAR select and will not reintubate. Per palliative care note 8/6  1.  Ongoing goals of care discussion: ongoing GOCD's - [place] confirmed NO CHEST Collie Masker BID   • pyridostigmine  30 mg Oral Q8H [address]   • azaTHIOprine  50 mg Oral Daily   • MethylPREDNISolone Sodium Succ  60 mg Intravenous Q12H   • ferrous sulfate  324 mg Oral Daily   • furosemide  40 mg Intravenous BID (Diuretic)   • sodium chloride   Intraveno melena  Genitourinary: Denies dysuria to increased urinary frequency or hematuria  Musculoskeletal: Denies myalgias or arthralgias  Integumentary: Denies rash  Neurological: Denies weakness , denies headache  Psychiatric: Denies depression or anxiety  Endo 143 143   K 4.2 4.2 4.1    102 102   CO2 37.0* 36.0* 37.0*     No results found for: PT, INR      REHAB DIAGNOSES:  Impaired mobility and self-care secondary to Acute ischemic stroke ([facility] [address] [I63.9].     Patient Active Problem List:     Pacemaker- [employer] Range of motion and strengthening and Ambulation  · There are at least two functional impairments requiring at least minimum assistance.  Physical Therapy , Speech Therapy  and Occupational Therapy   · This patient should be able to tolerate at least 3 hour

## 2021-08-07 NOTE — SIGNIFICANT EVENT
Death Note  Patient  TOD 8:36 pm.  No spont respirations. Asystole. Sourav at bedside. Family contacted as well.

## 2021-08-07 NOTE — PLAN OF CARE
Patient at the start of shift 7pm with copious amounts of secretions and coughing episodes, frequently suctioned and oral care given. At 8:36 pm patient took his last breath and passed peacefully with no signs of distress. Dr. Genie Reece made awere.   3 nurses i

## 2021-08-09 ENCOUNTER — TELEPHONE (OUTPATIENT)
Dept: PULMONOLOGY | Facility: CLINIC | Age: 86
End: 2021-08-09

## 2021-08-09 NOTE — TELEPHONE ENCOUNTER
Per Dr. Jessica Howell pt passed away & he would like to discuss diagnoses for death cert w/ Dr. Enma Lagos. His cell phone # is [phone]. Dr. Enma Lagos- hospitals call.

## 2021-08-09 NOTE — PAYOR COMM NOTE
Discharge Notification    Patient Name: Monique Michelle #: [id number]  Authorization Number: [id number]  Admit Date/Time: 7/5/2021 2:53 PM  Discharge Date/Time: 8/6/2021 11:23 PM

## 2021-08-10 NOTE — TELEPHONE ENCOUNTER
James Storm @ [facility] is aware that completed death cert was faxed to the state.

## 2021-08-13 NOTE — TELEPHONE ENCOUNTER
Awaiting MD to sign bottom of courtesy copy of death record w/ correct time of death, date pronounced, & address of person completing cause of death.

## 2021-08-17 ENCOUNTER — TELEPHONE (OUTPATIENT)
Dept: INTERNAL MEDICINE CLINIC | Facility: CLINIC | Age: 86
End: 2021-08-17

## 2021-08-17 NOTE — TELEPHONE ENCOUNTER
Spoke to Marco today. Discussed dad's illness in the hospital and his ultimate passing away. Marco appreciated the phone call.

## 2021-08-24 NOTE — TELEPHONE ENCOUNTER
Signed & corrected courtesy copy of death record was faxed to the state @ #[phone]. Confirmation rcvd.

## 2021-10-11 ENCOUNTER — TELEPHONE (OUTPATIENT)
Dept: PULMONOLOGY | Facility: CLINIC | Age: 86
End: 2021-10-11

## 2021-10-11 NOTE — TELEPHONE ENCOUNTER
Ирина from the medical records at the hospital called in to follow up on receiving Expiration note and the discharge summary. There are other patient's that Dr. Juan Ferguson needs to complete paperwork for as well.  Please follow up

## 2021-10-19 NOTE — DISCHARGE SUMMARY
[facility]    Discharge Summary    Nataly Aguiar Patient Status:  Inpatient    1932 MRN [id number]   Location [facility] 2W/SW Attending No att. providers found   Hosp Day # 28 PCP Shea Salazar MD     Date of Admission:  pleurisy, TB exposure, personal nor family history of deep venous thrombosis.  Subsequently patient    Was intubated and placed on mechanical ventilation for aspiration pneumonia and CVA and myasthenia gravis with significant neuromuscular weakness severe C No re-intubation / evaluated by palliative consult      9- black stool         Consultants  Chat With All Active Members    Provider Role Specialty    Darin Mcclellan MD  Consulting Physician  Physical Medicine    Preston Uriarte MD  Consulting Physici 1000 MG Oral Capsule Delayed Release  Take 1 capsule by mouth daily. , Historical    IRON OR  Take 2 tablets by mouth daily. , Historical    aspirin 81 MG Oral Tab  Take 81 mg by mouth daily. , Historical          Neno Barnes MD  10/19/2021

## 2022-09-12 NOTE — PROGRESS NOTES
RRT    *See RRT Documentation Record*    Reason the RRT was called: oxygen levels were sustaining in the 80's with high-flow nasal cannula in place at 15. Non-re breather mask tried and did not increase sp02.          Assessment of patient leading up to RRT Yes

## 2022-11-08 NOTE — PROGRESS NOTES
[facility]    Progress Note      Assessment and Plan:     1. Respiratory failure–aspiration pneumonitis with weakness secondary to presumed myasthenic crisis. The patient definitely has pneumonia.   His strength improves significantly when Recommended artificial tears to use: 1 drop 4x a day in both eyes. murmur. Abdomen nontender, without hepatosplenomegaly and no mass appreciable. Extremities without clubbing cyanosis nor edema. Neurologic weaker today and much less expressive in the face.   Skin without gross abnormality     Results:     Lab Results

## 2024-04-16 NOTE — CM/SW NOTE
Attempted to call patient. Phone rang. No answer. Unable to leave voice message. Dr. Crawley has opening on 5/6/24. Plan to offer appointment with Dr. Crawley on 5/6/24 at 11:30am as patient was not seen on 4/11/24.    Yaz Garrison, RN, BSN  Cardiology RN Care Coordinator   [place]   Phone: [phone]  Fax: [phone] ([place]) [phone] ([place])     The pt. Remains intubated and sedated at this time. The pt. Will need PT/OT and PMR when more medically stable.      [facility], [facility] ext [phone]

## (undated) DEVICE — 6 ML SYRINGE LUER-LOCK TIP: Brand: MONOJECT

## (undated) DEVICE — Device: Brand: CUSTOM PROCEDURE KIT

## (undated) DEVICE — MEDI-VAC NON-CONDUCTIVE SUCTION TUBING 6MM X 1.8M (6FT.) L: Brand: CARDINAL HEALTH

## (undated) DEVICE — 3 ML SYRINGE LUER-LOCK TIP: Brand: MONOJECT

## (undated) DEVICE — 35 ML SYRINGE REGULAR TIP: Brand: MONOJECT

## (undated) DEVICE — Device: Brand: DEFENDO AIR/WATER/SUCTION AND BIOPSY VALVE

## (undated) DEVICE — CONMED SCOPE SAVER BITE BLOCK, 20X27 MM: Brand: SCOPE SAVER

## (undated) DEVICE — LINE MNTR ADLT SET O2 INTMD

## (undated) NOTE — LETTER
[address] [place] [address]  Authorization for Surgical Operation or Procedure  Date: ______________       Time: _______________  1.  I hereby authorize Dr. Lydia Rose, my physician and the assistant, to perform the following operation and/or p consent to the photographing of the operations or procedures to be performed for the purposes of advancing medicine, science, and/or education, provided my identity is not revealed.  If the procedure has been videotaped, the physician/surgeon will obtain th proposed treatment and any reasonable alternative to the proposed treatment. I have also explained the risks and benefits involved in the refusal of the proposed treatment and have answered the patient's questions.  If I have a significant financial interes

## (undated) NOTE — LETTER
[address] [place] [address]  Authorization for Surgical Operation or Procedure  Date: ______________       Time: _______________  1.  I hereby authorize Dr. Araceli Phoenix  my physician and the assistant, to perform the following operation and/or p the photographing of the operations or procedures to be performed for the purposes of advancing medicine, science, and/or education, provided my identity is not revealed.  If the procedure has been videotaped, the physician/surgeon will obtain the original treatment and any reasonable alternative to the proposed treatment. I have also explained the risks and benefits involved in the refusal of the proposed treatment and have answered the patient's questions.  If I have a significant financial interest in a co

## (undated) NOTE — LETTER
Jet Meyers [address]  [facility], [place], [place]  [address]  INFORMED CONSENT FOR TRANSFUSION OF BLOOD OR BLOOD PRODUCTS  My physician has informed me of the nature, purpose, benefits and risks of transfusion for blood and blood components that ______________________________________________  (Signature of Patient)                                                            (Responsible party in case of Minor,

## (undated) NOTE — LETTER
[facility]  Administration of Anesthesia  1. Darrin Amezquita, or _________________________________ acting on his behalf, (Patient) (Dependent/Representative) request to receive anesthesia for my pending procedure/operation/treatment.   DOUG price bleeding, seizure, cardiac arrest and death. 7. AWARENESS: I understand that it is possible (but unlikely) to have explicit memory of events from the operating room while under general anesthesia.   8. ELECTROCONVULSIVE THERAPY PATIENTS: This consent serve below affirms that prior to the time of the procedure, I have explained to the patient and/or his/her guardian, the risks and benefits of undergoing anesthesia, as well as any reasonable alternatives.     ___________________________________________________

## (undated) NOTE — LETTER
[facility], [place]  Authorization for Invasive Procedures  1.  I hereby authorize Dr. Jhony Gallardo , my physician and whomever may be designated as the doctor's assistant, to perform the following operation and/or procedure:  Right-side fever and allergic reactions, hemolytic reactions, transmission of disease such as hepatitis, AIDS, cytomegalovirus (CMV), and flluid overload.  In the event that I wish to have autologous transfusions of my own blood, or a directed donor transfusion, I [place] Signature of Patient:  ________________________________________________ Date: _________Time: _________    Responsible person in case of minor or unconscious: _____________________________Relationship: ____________     Witness Signature: ___________________

## (undated) NOTE — MR AVS SNAPSHOT
[facility] [address] [address]  [phone]               Thank you for choosing us for your health care visit with Christiano Silva MD.  We are glad to serve you and happy to provide you with this summary of your visit.   Please Assoc Dx:  Weight loss [R63.4]           Urinalysis, Routine [E]    Complete by:   Apr 03, 2017    Assoc Dx:  Coronary artery disease involving native coronary artery of native heart without angina pectoris [I25.10]                 Referral Details     Ref Assoc Dx:  Chronic atrial fibrillation (HCC) [I48.2], Status cardiac pacemaker [Z95.0], Coronary artery disease involving native coronary artery of native heart without angina pectoris [I25.10], S/P coronary artery stent placement [Z95.5]        GASTRO - This list is accurate as of: 4/3/17 11:09 AM.  Always use your most recent med list.                aspirin 81 MG Tabs   Take 81 mg by mouth daily.            atenolol 25 MG Tabs   Take 1 tablet by mouth  daily   Commonly known as:  TENORMIN           [place]

## (undated) NOTE — LETTER
[facility], [place]  Authorization for Invasive Procedures  1.  I hereby authorize  Dr Ivey Plant  my physician and whomever may be designated as the doctor's assistant, to perform the following operation and/or procedure: Upgrade to [facility] 5. I consent to the photographing of the operations or procedures to be performed for the purposes of advancing medicine, science, and/or education, provided my identity is not revealed.  If the procedure has been videotaped, the physician/surgeon will obta __________ Time: ___________    Statement of Physician  My signature below affirms that prior to the time of the procedure, I have explained to the patient and/or his legal representative, the risks and benefits involved in the proposed treatment and any r

## (undated) NOTE — MR AVS SNAPSHOT
[facility]  [address] [place] [address] [phone]               Thank you for choosing us for your health care visit with Jaja Alston MD.  We are glad to serve you and happy to provide you with this summary of your vis Take 1 tablet by mouth  nightly   Commonly known as:  Janelle Song                  Visit [facility] online at  [email or web address]

## (undated) NOTE — LETTER
[facility], [place]  Authorization for Invasive Procedures  1.  I hereby authorize Dr. Roma Moreno , my physician and whomever may be designated as the doctor's assistant, to perform the following operation and/or procedure:  EGD on Arthur hemolytic reactions, transmission of disease such as hepatitis, AIDS, cytomegalovirus (CMV), and flluid overload.  In the event that I wish to have autologous transfusions of my own blood, or a directed donor transfusion, I will discuss this with my physici ________________________________________________ Date: _________Time: _________    Responsible person in case of minor or unconscious: _____________________________Relationship: ____________     Witness Signature: __________________________________________

## (undated) NOTE — LETTER
[address], [place]  Authorization for Invasive Procedures  1.  I hereby authorize Dr. Navi Campos , my physician and whomever may be designated as the doctor's assistant, to perform the following operation and/or procedure:  Plasmap allergic reactions, hemolytic reactions, transmission of disease such as hepatitis, AIDS, cytomegalovirus (CMV), and flluid overload.  In the event that I wish to have autologous transfusions of my own blood, or a directed donor transfusion, I will discuss Patient:  ________________________________________________ Date: _________Time: _________    Responsible person in case of minor or unconscious: _____________________________Relationship: ____________     Witness Signature: ________________________________

## (undated) NOTE — LETTER
[facility]  Administration of Anesthesia  1. Nathan Galeana, or _________________________________ acting on his behalf, (Patient) (Dependent/Representative) request to receive anesthesia for my pending procedure/operation/treatment.   DOUG price infections, high spinal block, spinal bleeding, seizure, cardiac arrest and death. 7. AWARENESS: I understand that it is possible (but unlikely) to have explicit memory of events from the operating room while under general anesthesia.   8. ELECTROCONVULSIV unconscious pt /Relationship    My signature below affirms that prior to the time of the procedure, I have explained to the patient and/or his/her guardian, the risks and benefits of undergoing anesthesia, as well as any reasonable alternatives.     _______

## (undated) NOTE — LETTER
[address] [facility]     I agree to have a Peripherally Inserted Central Catheter (PICC) placed in my arm.    1. The PICC insertion procedure, care, maintenance, risks, benefits, and complications have been explained to me b the PICC, including risks, benefits, and side effects related to the alternatives and risks related to not receiving this procedure.     8.  I have expressed any questions about this procedure to my physician or the PICC Proceduralist and he/she has answere